# Patient Record
Sex: FEMALE | Race: WHITE | NOT HISPANIC OR LATINO | Employment: OTHER | URBAN - METROPOLITAN AREA
[De-identification: names, ages, dates, MRNs, and addresses within clinical notes are randomized per-mention and may not be internally consistent; named-entity substitution may affect disease eponyms.]

---

## 2017-01-06 ENCOUNTER — TELEPHONE (OUTPATIENT)
Dept: INTERNAL MEDICINE | Facility: CLINIC | Age: 67
End: 2017-01-06

## 2017-03-04 DIAGNOSIS — K21.9 GASTROESOPHAGEAL REFLUX DISEASE, ESOPHAGITIS PRESENCE NOT SPECIFIED: ICD-10-CM

## 2017-03-04 NOTE — TELEPHONE ENCOUNTER
omeprazole (PRILOSEC) 40 MG capsule      Last Written Prescription Date: 12/06/16  Last Fill Quantity: 90 cap,  # refills: 0   Last Office Visit with [facility], [facility] or [facility] prescribing provider: 10/04/16

## 2017-03-06 RX ORDER — OMEPRAZOLE 40 MG/1
40 CAPSULE, DELAYED RELEASE ORAL DAILY
Qty: 90 CAPSULE | Refills: 2 | Status: SHIPPED | OUTPATIENT
Start: 2017-03-06 | End: 2017-10-17

## 2017-06-05 ENCOUNTER — HOSPITAL ENCOUNTER (EMERGENCY)
Facility: CLINIC | Age: 67
Discharge: HOME | End: 2017-06-05
Attending: EMERGENCY MEDICINE | Admitting: EMERGENCY MEDICINE
Payer: COMMERCIAL

## 2017-06-05 ENCOUNTER — APPOINTMENT (OUTPATIENT)
Dept: GENERAL RADIOLOGY | Facility: CLINIC | Age: 67
End: 2017-06-05
Attending: EMERGENCY MEDICINE
Payer: COMMERCIAL

## 2017-06-05 VITALS
HEIGHT: 65 IN | TEMPERATURE: 98 F | BODY MASS INDEX: 24.66 KG/M2 | SYSTOLIC BLOOD PRESSURE: 127 MMHG | OXYGEN SATURATION: 96 % | WEIGHT: 148 LBS | DIASTOLIC BLOOD PRESSURE: 78 MMHG | RESPIRATION RATE: 18 BRPM

## 2017-06-05 DIAGNOSIS — R07.2 PRECORDIAL CHEST PAIN: ICD-10-CM

## 2017-06-05 DIAGNOSIS — K21.9 GASTROESOPHAGEAL REFLUX DISEASE, ESOPHAGITIS PRESENCE NOT SPECIFIED: ICD-10-CM

## 2017-06-05 LAB
ALBUMIN SERPL-MCNC: 4.1 G/DL (ref 3.4–5)
ALP SERPL-CCNC: 79 U/L (ref 40–150)
ALT SERPL W P-5'-P-CCNC: 28 U/L (ref 0–50)
ANION GAP SERPL CALCULATED.3IONS-SCNC: 9 MMOL/L (ref 3–14)
AST SERPL W P-5'-P-CCNC: 17 U/L (ref 0–45)
BASOPHILS # BLD AUTO: 0 10E9/L (ref 0–0.2)
BASOPHILS NFR BLD AUTO: 0.4 %
BILIRUB SERPL-MCNC: 0.4 MG/DL (ref 0.2–1.3)
BUN SERPL-MCNC: 24 MG/DL (ref 7–30)
CALCIUM SERPL-MCNC: 9.4 MG/DL (ref 8.5–10.1)
CHLORIDE SERPL-SCNC: 109 MMOL/L (ref 94–109)
CO2 SERPL-SCNC: 22 MMOL/L (ref 20–32)
CREAT SERPL-MCNC: 0.72 MG/DL (ref 0.52–1.04)
DIFFERENTIAL METHOD BLD: NORMAL
EOSINOPHIL # BLD AUTO: 0.1 10E9/L (ref 0–0.7)
EOSINOPHIL NFR BLD AUTO: 0.6 %
ERYTHROCYTE [DISTWIDTH] IN BLOOD BY AUTOMATED COUNT: 13.1 % (ref 10–15)
GFR SERPL CREATININE-BSD FRML MDRD: 81 ML/MIN/1.7M2
GLUCOSE SERPL-MCNC: 110 MG/DL (ref 70–99)
HCT VFR BLD AUTO: 44.2 % (ref 35–47)
HGB BLD-MCNC: 14.5 G/DL (ref 11.7–15.7)
IMM GRANULOCYTES # BLD: 0 10E9/L (ref 0–0.4)
IMM GRANULOCYTES NFR BLD: 0.4 %
INTERPRETATION ECG - MUSE: NORMAL
LIPASE SERPL-CCNC: 162 U/L (ref 73–393)
LYMPHOCYTES # BLD AUTO: 1.1 10E9/L (ref 0.8–5.3)
LYMPHOCYTES NFR BLD AUTO: 13.3 %
MCH RBC QN AUTO: 30.3 PG (ref 26.5–33)
MCHC RBC AUTO-ENTMCNC: 32.8 G/DL (ref 31.5–36.5)
MCV RBC AUTO: 93 FL (ref 78–100)
MONOCYTES # BLD AUTO: 0.4 10E9/L (ref 0–1.3)
MONOCYTES NFR BLD AUTO: 5.1 %
NEUTROPHILS # BLD AUTO: 6.7 10E9/L (ref 1.6–8.3)
NEUTROPHILS NFR BLD AUTO: 80.2 %
NRBC # BLD AUTO: 0 10*3/UL
NRBC BLD AUTO-RTO: 0 /100
PLATELET # BLD AUTO: 259 10E9/L (ref 150–450)
POTASSIUM SERPL-SCNC: 3.8 MMOL/L (ref 3.4–5.3)
PROT SERPL-MCNC: 7.2 G/DL (ref 6.8–8.8)
RBC # BLD AUTO: 4.78 10E12/L (ref 3.8–5.2)
SODIUM SERPL-SCNC: 140 MMOL/L (ref 133–144)
TROPONIN I SERPL-MCNC: NORMAL UG/L (ref 0–0.04)
WBC # BLD AUTO: 8.3 10E9/L (ref 4–11)

## 2017-06-05 PROCEDURE — 84484 ASSAY OF TROPONIN QUANT: CPT | Performed by: EMERGENCY MEDICINE

## 2017-06-05 PROCEDURE — 25000132 ZZH RX MED GY IP 250 OP 250 PS 637: Performed by: EMERGENCY MEDICINE

## 2017-06-05 PROCEDURE — 99285 EMERGENCY DEPT VISIT HI MDM: CPT | Mod: 25

## 2017-06-05 PROCEDURE — 71020 XR CHEST 2 VW: CPT

## 2017-06-05 PROCEDURE — 93005 ELECTROCARDIOGRAM TRACING: CPT

## 2017-06-05 PROCEDURE — 80053 COMPREHEN METABOLIC PANEL: CPT | Performed by: EMERGENCY MEDICINE

## 2017-06-05 PROCEDURE — 25000125 ZZHC RX 250: Performed by: EMERGENCY MEDICINE

## 2017-06-05 PROCEDURE — 83690 ASSAY OF LIPASE: CPT | Performed by: EMERGENCY MEDICINE

## 2017-06-05 PROCEDURE — 85025 COMPLETE CBC W/AUTO DIFF WBC: CPT | Performed by: EMERGENCY MEDICINE

## 2017-06-05 RX ORDER — SUCRALFATE ORAL 1 G/10ML
1 SUSPENSION ORAL 4 TIMES DAILY
Qty: 240 ML | Refills: 0 | Status: ON HOLD | OUTPATIENT
Start: 2017-06-05 | End: 2017-12-21

## 2017-06-05 RX ORDER — TIZANIDINE 2 MG/1
2-4 TABLET ORAL 3 TIMES DAILY PRN
Qty: 10 TABLET | Refills: 0 | Status: SHIPPED | OUTPATIENT
Start: 2017-06-05 | End: 2018-10-10

## 2017-06-05 RX ADMIN — LIDOCAINE HYDROCHLORIDE 30 ML: 20 SOLUTION ORAL; TOPICAL at 10:05

## 2017-06-05 ASSESSMENT — ENCOUNTER SYMPTOMS: SHORTNESS OF BREATH: 0

## 2017-06-05 NOTE — ED AVS SNAPSHOT
[facility] Emergency Department    [address]    [facility] [address]    Phone:  [phone]    Fax:  [phone]                                       Debby Chan   MRN: [id number]    Department:  [facility] Emergency Department   Date of Visit:  6/5/2017           Patient Information     Date Of Birth          1950        Your diagnoses for this visit were:     Precordial chest pain     Gastroesophageal reflux disease, esophagitis presence not specified        You were seen by Leanna Mcfarlane MD.      Follow-up Information     Follow up with Ida Lyles MD In 1 week.    Specialty:  Internal Medicine    Contact information:    [facility]  [address] [facility] [address]  [phone]          Follow up with [facility] Emergency Department.    Specialty:  EMERGENCY MEDICINE    Why:  As needed, If symptoms worsen    Contact information:    [address]  [facility] [address] [phone]        Discharge Instructions       Enteric coated ibuprofen (advil; motrin) is easier on the stomach     Discharge Instructions  Chest Pain    You have been seen today for chest pain or discomfort.  At this time, your doctor has found no signs that your chest pain is due to a serious or life-threatening condition, (or you have declined more testing and/or admission to the hospital). However, sometimes there is a serious problem that does not show up right away. Your evaluation today may not be complete and you may need further testing and evaluation.     You need to follow-up with your regular doctor.    Return to the Emergency Department if:    Your chest pain changes, gets worse, starts to happen more often, or comes with less activity.    You are short of breath.    You get very weak or tired.    You pass out or faint.    You have any new symptoms, like fever, cough, numb legs, or you cough up  blood.    You have anything else that worries you.    Until you follow-up with your regular doctor please do the following:    Take one aspirin daily unless you have an allergy or are told not to by your doctor.    If a stress test appointment has been made, go to the appointment.    If you have questions, contact your regular doctor.    If your doctor today has told you to follow-up with your regular doctor, it is very important that you make an appointment with your clinic and go to the appointment.  If you do not follow-up with your primary doctor, it may result in missing an important development which could result in permanent injury or disability and/or lasting pain.  If there is any problem keeping your appointment, call your doctor or return to the Emergency Department.    If you were given a prescription for medicine here today, be sure to read all of the information (including the package insert) that comes with your prescription.  This will include important information about the medicine, its side effects, and any warnings that you need to know about.  The pharmacist who fills the prescription can provide more information and answer questions you may have about the medicine.  If you have questions or concerns that the pharmacist cannot address, please call or return to the Emergency Department.       Remember that you can always come back to the Emergency Department if you are not able to see your regular doctor in the amount of time listed above, if you get any new symptoms, or if there is anything that worries you.          24 Hour Appointment Hotline       To make an appointment at any [facility], call [phone] ([phone]). If you don't have a family doctor or clinic, we will help you find one. [facility] are conveniently located to serve the needs of you and your family.             Review of your medicines      START taking        Dose / Directions Last dose taken    sucralfate  1 GM/10ML suspension   Commonly known as:  CARAFATE   Dose:  1 g   Quantity:  240 mL        Take 10 mLs (1 g) by mouth 4 times daily (heartburn symptoms)   Refills:  0        tiZANidine 2 MG tablet   Commonly known as:  ZANAFLEX   Dose:  2-4 mg   Quantity:  10 tablet        Take 1-2 tablets (2-4 mg) by mouth 3 times daily as needed (muscle relaxant)   Refills:  0          Our records show that you are taking the medicines listed below. If these are incorrect, please call your family doctor or clinic.        Dose / Directions Last dose taken    atorvastatin 20 MG tablet   Commonly known as:  LIPITOR   Dose:  20 mg   Quantity:  90 tablet        Take 1 tablet (20 mg) by mouth daily   Refills:  3        CALCIUM + D PO        Take  by mouth. Plus magnesium   Refills:  0        omeprazole 40 MG capsule   Commonly known as:  priLOSEC   Dose:  40 mg   Quantity:  90 capsule        Take 1 capsule (40 mg) by mouth daily Take 30-60 minutes before a meal.   Refills:  2        vitamin D 2000 UNITS tablet   Dose:  1 tablet        Take 1 tablet by mouth daily.   Refills:  0                Prescriptions were sent or printed at these locations (2 Prescriptions)                   Other Prescriptions                Printed at Department/Unit printer (2 of 2)         sucralfate (CARAFATE) 1 GM/10ML suspension               tiZANidine (ZANAFLEX) 2 MG tablet                Procedures and tests performed during your visit     CBC with platelets differential    Chest XR,  PA & LAT    Comprehensive metabolic panel    EKG 12-lead, tracing only    Lipase    Troponin I      Orders Needing Specimen Collection     None      Pending Results     No orders found from 6/3/2017 to 6/6/2017.            Pending Culture Results     No orders found from 6/3/2017 to 6/6/2017.            Pending Results Instructions     If you had any lab results that were not finalized at the time of your Discharge, you can call the ED Lab Result RN at [phone]. You  will be contacted by this team for any positive Lab results or changes in treatment. The nurses are available 7 days a week from 10A to 6:30P.  You can leave a message 24 hours per day and they will return your call.        Test Results From Your Hospital Stay        6/5/2017 11:06 AM      Component Results     Component Value Ref Range & Units Status    Sodium 140 133 - 144 mmol/L Final    Potassium 3.8 3.4 - 5.3 mmol/L Final    Chloride 109 94 - 109 mmol/L Final    Carbon Dioxide 22 20 - 32 mmol/L Final    Anion Gap 9 3 - 14 mmol/L Final    Glucose 110 (H) 70 - 99 mg/dL Final    Urea Nitrogen 24 7 - 30 mg/dL Final    Creatinine 0.72 0.52 - 1.04 mg/dL Final    GFR Estimate 81 >60 mL/min/1.7m2 Final    Non  GFR Calc    GFR Estimate If Black >90   GFR Calc   >60 mL/min/1.7m2 Final    Calcium 9.4 8.5 - 10.1 mg/dL Final    Bilirubin Total 0.4 0.2 - 1.3 mg/dL Final    Albumin 4.1 3.4 - 5.0 g/dL Final    Protein Total 7.2 6.8 - 8.8 g/dL Final    Alkaline Phosphatase 79 40 - 150 U/L Final    ALT 28 0 - 50 U/L Final    AST 17 0 - 45 U/L Final         6/5/2017 11:06 AM      Component Results     Component Value Ref Range & Units Status    Lipase 162 73 - 393 U/L Final         6/5/2017 11:06 AM      Component Results     Component Value Ref Range & Units Status    Troponin I ES  0.000 - 0.045 ug/L Final    <0.015  The 99th percentile for upper reference range is 0.045 ug/L.  Troponin values in   the range of 0.045 - 0.120 ug/L may be associated with risks of adverse   clinical events.           6/5/2017 10:43 AM      Component Results     Component Value Ref Range & Units Status    WBC 8.3 4.0 - 11.0 10e9/L Final    RBC Count 4.78 3.8 - 5.2 10e12/L Final    Hemoglobin 14.5 11.7 - 15.7 g/dL Final    Hematocrit 44.2 35.0 - 47.0 % Final    MCV 93 78 - 100 fl Final    MCH 30.3 26.5 - 33.0 pg Final    MCHC 32.8 31.5 - 36.5 g/dL Final    RDW 13.1 10.0 - 15.0 % Final    Platelet Count 259 150 - 450  10e9/L Final    Diff Method Automated Method  Final    % Neutrophils 80.2 % Final    % Lymphocytes 13.3 % Final    % Monocytes 5.1 % Final    % Eosinophils 0.6 % Final    % Basophils 0.4 % Final    % Immature Granulocytes 0.4 % Final    Nucleated RBCs 0 0 /100 Final    Absolute Neutrophil 6.7 1.6 - 8.3 10e9/L Final    Absolute Lymphocytes 1.1 0.8 - 5.3 10e9/L Final    Absolute Monocytes 0.4 0.0 - 1.3 10e9/L Final    Absolute Eosinophils 0.1 0.0 - 0.7 10e9/L Final    Absolute Basophils 0.0 0.0 - 0.2 10e9/L Final    Abs Immature Granulocytes 0.0 0 - 0.4 10e9/L Final    Absolute Nucleated RBC 0.0  Final         6/5/2017 10:41 AM      Narrative     CHEST TWO VIEWS  6/5/2017 10:20 AM     HISTORY: Sternal pain.     COMPARISON: Chest x-ray 12/6/2016.        Impression     IMPRESSION: PA and lateral views of the chest. Lungs are clear. Heart  is normal in size. No effusions are evident. No pneumothorax.    JOSE AGUILAR MD                Clinical Quality Measure: Blood Pressure Screening     Your blood pressure was checked while you were in the emergency department today. The last reading we obtained was  BP: 127/71 . Please read the guidelines below about what these numbers mean and what you should do about them.  If your systolic blood pressure (the top number) is less than 120 and your diastolic blood pressure (the bottom number) is less than 80, then your blood pressure is normal. There is nothing more that you need to do about it.  If your systolic blood pressure (the top number) is 120-139 or your diastolic blood pressure (the bottom number) is 80-89, your blood pressure may be higher than it should be. You should have your blood pressure rechecked within a year by a primary care provider.  If your systolic blood pressure (the top number) is 140 or greater or your diastolic blood pressure (the bottom number) is 90 or greater, you may have high blood pressure. High blood pressure is treatable, but if left untreated over  time it can put you at risk for heart attack, stroke, or kidney failure. You should have your blood pressure rechecked by a primary care provider within the next 4 weeks.  If your provider in the emergency department today gave you specific instructions to follow-up with your doctor or provider even sooner than that, you should follow that instruction and not wait for up to 4 weeks for your follow-up visit.        Thank you for choosing [facility]       Thank you for choosing [facility] for your care. Our goal is always to provide you with excellent care. Hearing back from our patients is one way we can continue to improve our services. Please take a few minutes to complete the written survey that you may receive in the mail after you visit with us. Thank you!        [employer] Information     [employer] gives you secure access to your electronic health record. If you see a primary care provider, you can also send messages to your care team and make appointments. If you have questions, please call your primary care clinic.  If you do not have a primary care provider, please call [phone] and they will assist you.        Care EveryWhere ID     This is your Care EveryWhere ID. This could be used by other organizations to access your [facility] medical records  [id number]        After Visit Summary       This is your record. Keep this with you and show to your community pharmacist(s) and doctor(s) at your next visit.

## 2017-06-05 NOTE — ED AVS SNAPSHOT
[facility] Emergency Department    [address]    [facility] [address]    Phone:  [phone]    Fax:  [phone]                                       Debby Chan   MRN: [id number]    Department:  [facility] Emergency Department   Date of Visit:  6/5/2017           After Visit Summary Signature Page     I have received my discharge instructions, and my questions have been answered. I have discussed any challenges I see with this plan with the nurse or doctor.    ..........................................................................................................................................  Patient/Patient Representative Signature      ..........................................................................................................................................  Patient Representative Print Name and Relationship to Patient    ..................................................               ................................................  Date                                            Time    ..........................................................................................................................................  Reviewed by Signature/Title    ...................................................              ..............................................  Date                                                            Time

## 2017-06-05 NOTE — ED PROVIDER NOTES
"  History     Chief Complaint:  Chest pain    HPI   Debby Chan is a 66 year old female who presents with chest pain. The patient reports that she suffered a chest wall injury several years ago, that occasionally flares up when she \"overdoes something\". Typically when this occurs she has acid reflux kick in. The patient reports she had a flare up of this chest pain four days ago. She describes a burning sensation in her chest. This is very similar to previous episodes, but has been longer lasting. Pepto-bismol helps with the pain but then it comes back. She takes omeprazole daily. The pain does not worsen with eating. Denies shortness of breath.  Has not had endoscopy since ED evaluation for this last winter because her PCP wanted her on three months of PPI first.    Allergies:  Penicillin     Medications:    Prilosec  Lipitor  Multivitamins  Coenzyme Q    Past Medical History:    HLD    Past Surgical History:    Breast biopsy  Tubal ligation    Family History:    CAD  Prostate cancer  Type II DM  Heart failure  Breast cancer    Social History:  Relationship status:   Tobacco use: Negative  Alcohol use: Positive  The patient presents alone.     Review of Systems   Respiratory: Negative for shortness of breath.    Cardiovascular: Positive for chest pain.   All other systems reviewed and are negative.      Physical Exam   First Vitals:  BP: 154/92  Temp: 98  F (36.7  C)  Temp src: Oral  Resp: 18  SpO2: 99 %  Height: 163.8 cm (5' 4.5\")  Weight: 67.1 kg (148 lb)  Physical Exam  Eyes:  Sclera white; Pupils are equal and round  ENT:    External ears and nares normal  CV:  Regular rate and rhythm, No murmur     No carotid bruit    Sternal tenderness, no rash    No BLE edema or calf tenderness  Resp:  Breath sounds clear and equal bilaterally  GI:  Abdomen is soft, non-tender, non-distended    No rebound tenderness or peritoneal features  MS:  Moves all extremities  Skin:  Warm and dry  Neuro: Speech is " normal and fluent. No apparent deficit.          Emergency Department Course   ECG (10:04:51):  Indication: Chest pain.   Rate 70 bpm. AZ interval 134. QRS duration 70. QT/QTc 390/421. P-R-T axes 26.   Interpretation: Normal sinus rhythm.  Agree with computer interpretation.  No significant change compared to EKG dated 12/6/16.   Interpreted at 1015 by Dr. Mcfarlane.     Imaging:  Radiographic findings were communicated with the patient who voiced understanding of the findings.    Chest XR, PA and LAT, per radiology:   PA and lateral views of the chest. Lungs are clear. Heart is normal in size. No effusions are evident. No pneumothorax.    Laboratory:  CBC: WNL (WBC 8.3, HGB 14.5, )  CMP: Glucose 110 (H), o/w WNL (Creatinine 0.72)  1005: Troponin I: <0.015  Lipase: 162    Interventions:  1005: GI cocktail, 30 mL, PO    Emergency Department Course:  Nursing notes and vitals reviewed.  I performed an exam of the patient as documented above.  The above workup was undertaken.  1130: I rechecked the patient and discussed results.    Findings and plan explained to the Patient. Patient discharged home, status improved, with instructions regarding supportive care, medications, and reasons to return as well as the importance of close follow-up was reviewed.      Impression & Plan      Medical Decision Making:  Debby Chan is a 66 year old female here for evaluation of chest pain and worsening of her heart burn symptoms. This feels similar to when she has had muscular flares in the past. Based on her age, however, this could be an atypical presentation of ACS. Differential also includes costochondritis, underlying pneumonia, pleural effusion, or GI process such as pancreatitis. EKG and blood work were checked. Based on duration of symptoms, a single troponin is sufficient to rule out underlying cardiac process. She felt improved after GI cocktail. She will be expanding her treatment with the addition of  Carafate and close PCP follow up to discuss endoscopy. She asked for something else for the chest wall discomfort, and muscle relaxer was prescribed.     Diagnosis:    ICD-10-CM   1. Precordial chest pain R07.2   2. Gastroesophageal reflux disease, esophagitis presence not specified K21.9     Disposition:  Discharge to home with primary care follow up.    Discharge Medications:   SUCRALFATE (CARAFATE) 1 GM/10ML SUSPENSION    Take 10 mLs (1 g) by mouth 4 times daily (heartburn symptoms)    TIZANIDINE (ZANAFLEX) 2 MG TABLET    Take 1-2 tablets (2-4 mg) by mouth 3 times daily as needed (muscle relaxant)     IArash, am serving as a scribe on 6/5/2017 at 9:49 AM to personally document services performed by Leanna Mcfarlane MD, based on my observations and the provider's statements to me.    [facility] EMERGENCY DEPARTMENT       Leanna Mcfarlane MD  06/05/17 [id number]

## 2017-06-05 NOTE — ED NOTES
"Hx acid reflux.  States if does something strenuous it flares up.  Hx from hitting chest on coat rack 20 years ago.  Had a flare up on Friday, 3 days ago.  This morning \"it burns\" and has sinus on cheek this morning. Denies trouble breathing, leg swelling.  Has been taking generic tylenol and omeprazole this morning, also takes pepto bismol for flares; which has not taken today. States has lifted something that causes a flare. Pain sometimes worse if doesn't eat.  States cardiac hx in family, hears heart beating at times.  Is concerned about heart. ABCD's intact; A/o x 4.   "

## 2017-06-05 NOTE — DISCHARGE INSTRUCTIONS
Enteric coated ibuprofen (advil; motrin) is easier on the stomach     Discharge Instructions  Chest Pain    You have been seen today for chest pain or discomfort.  At this time, your doctor has found no signs that your chest pain is due to a serious or life-threatening condition, (or you have declined more testing and/or admission to the hospital). However, sometimes there is a serious problem that does not show up right away. Your evaluation today may not be complete and you may need further testing and evaluation.     You need to follow-up with your regular doctor.    Return to the Emergency Department if:    Your chest pain changes, gets worse, starts to happen more often, or comes with less activity.    You are short of breath.    You get very weak or tired.    You pass out or faint.    You have any new symptoms, like fever, cough, numb legs, or you cough up blood.    You have anything else that worries you.    Until you follow-up with your regular doctor please do the following:    Take one aspirin daily unless you have an allergy or are told not to by your doctor.    If a stress test appointment has been made, go to the appointment.    If you have questions, contact your regular doctor.    If your doctor today has told you to follow-up with your regular doctor, it is very important that you make an appointment with your clinic and go to the appointment.  If you do not follow-up with your primary doctor, it may result in missing an important development which could result in permanent injury or disability and/or lasting pain.  If there is any problem keeping your appointment, call your doctor or return to the Emergency Department.    If you were given a prescription for medicine here today, be sure to read all of the information (including the package insert) that comes with your prescription.  This will include important information about the medicine, its side effects, and any warnings that you need to know  about.  The pharmacist who fills the prescription can provide more information and answer questions you may have about the medicine.  If you have questions or concerns that the pharmacist cannot address, please call or return to the Emergency Department.       Remember that you can always come back to the Emergency Department if you are not able to see your regular doctor in the amount of time listed above, if you get any new symptoms, or if there is anything that worries you.

## 2017-06-15 ENCOUNTER — OFFICE VISIT (OUTPATIENT)
Dept: INTERNAL MEDICINE | Facility: CLINIC | Age: 67
End: 2017-06-15
Payer: COMMERCIAL

## 2017-06-15 VITALS
HEART RATE: 74 BPM | OXYGEN SATURATION: 98 % | HEIGHT: 65 IN | SYSTOLIC BLOOD PRESSURE: 108 MMHG | BODY MASS INDEX: 24.47 KG/M2 | TEMPERATURE: 97.8 F | DIASTOLIC BLOOD PRESSURE: 60 MMHG | WEIGHT: 146.9 LBS

## 2017-06-15 DIAGNOSIS — K21.9 GASTROESOPHAGEAL REFLUX DISEASE, ESOPHAGITIS PRESENCE NOT SPECIFIED: Primary | ICD-10-CM

## 2017-06-15 PROCEDURE — 99213 OFFICE O/P EST LOW 20 MIN: CPT | Performed by: INTERNAL MEDICINE

## 2017-06-15 NOTE — PROGRESS NOTES
"  SUBJECTIVE:                                                      HPI: Debby Chan is a pleasant 66 year old female who presents for ER follow-up:    - has a history of \"weak chest wall\" after a chest wall injury many years ago (walked into a clothing rack)  - whenever she overexerts herself, she develops chest wall pain  - for her chest wall pain she takes aspirin or ibuprofen, often in the middle the night on an empty stomach  - shortly after taking aspirin or ibuprofen she developed severe heartburn  - severe heartburn has now prompted several visits to the ER (November, 2016, December, 2016, and last week)  - symptoms resolve with GI cocktails in ER; cardiac workups negative  - no recurrent symptoms since ER visit    - on omeprazole 40 mg daily  - no acid reflux symptoms in the absence of taking aspirin or ibuprofen, as above    - no nausea, vomiting, or abdominal pain  - no anorexia, early satiety, or unintentional weight loss  - no diarrhea, constipation, melena, or hematochezia  - no fevers or chills    The medication, allergy, and problem lists have been reviewed and updated as appropriate.       OBJECTIVE:                                                      /60 (BP Location: Left arm, Patient Position: Chair, Cuff Size: Adult Regular)  Pulse 74  Temp 97.8  F (36.6  C) (Oral)  Ht 5' 4.5\" (1.638 m)  Wt 146 lb 14.4 oz (66.6 kg)  SpO2 98%  BMI 24.83 kg/m2  Constitutional: well-appearing  Respiratory: normal respiratory effort; clear to auscultation bilaterally  Cardiovascular: regular rate and rhythm; no edema  Gastrointestinal: soft, non-tender, non-distended, and bowel sounds present; no organomegaly or masses   Musculoskeletal: normal gait and station  Psych: normal judgment and insight; normal mood and affect; recent and remote memory intact      ASSESSMENT/PLAN:                                                      (K21.9) Gastroesophageal reflux disease, esophagitis presence not " specified  (primary encounter diagnosis)  Comment:    - generally well controlled on omeprazole 40 mg daily.   - clearly exacerbated by aspirin and ibuprofen use on an empty stomach.   - no red flag symptoms to suggest significant pathology.  Plan:    - CONTINUE omeprazole 40 mg daily.   - avoid overexertion.   - may try enteric coated aspirin and ibuprofen - may help, may not.   - always take aspirin and ibuprofen with meals, never on an empty stomach.   - if symptoms recur in spite of above interventions, please contact M.D.    The instructions on the AVS were discussed and explained to the patient. Patient expressed understanding of instructions.    Ida Lyles MD   [address] [address]  T: [phone], F: [phone]

## 2017-06-15 NOTE — PATIENT INSTRUCTIONS
Try EC (enteric coated) ibuprofen or aspirin next time. May help (not guaranteed).     Take aspirin and ibuprofen with food.    Continue Omeprazole.    May use Sucralfate (aka Carafate) 4x/day as needed for additional relief.

## 2017-06-15 NOTE — MR AVS SNAPSHOT
After Visit Summary   6/15/2017    Debby Chan    MRN: [id number]           Patient Information     Date Of Birth          1950        Visit Information        Provider Department      6/15/2017 9:00 AM Ida Lyles MD [facility]        Care Instructions    Try EC (enteric coated) ibuprofen or aspirin next time. May help (not guaranteed).     Take aspirin and ibuprofen with food.    Continue Omeprazole.    May use Sucralfate (aka Carafate) 4x/day as needed for additional relief.             Follow-ups after your visit        Who to contact     If you have questions or need follow up information about today's clinic visit or your schedule please contact [facility] directly at [phone].  Normal or non-critical lab and imaging results will be communicated to you by MyChart, letter or phone within 4 business days after the clinic has received the results. If you do not hear from us within 7 days, please contact the clinic through MyChart or phone. If you have a critical or abnormal lab result, we will notify you by phone as soon as possible.  Submit refill requests through [employer] or call your pharmacy and they will forward the refill request to us. Please allow 3 business days for your refill to be completed.          Additional Information About Your Visit        MyChart Information     [employer] gives you secure access to your electronic health record. If you see a primary care provider, you can also send messages to your care team and make appointments. If you have questions, please call your primary care clinic.  If you do not have a primary care provider, please call [phone] and they will assist you.        Care EveryWhere ID     This is your Care EveryWhere ID. This could be used by other organizations to access your [place] medical records  [id number]        Your Vitals Were     Pulse Temperature Height Pulse Oximetry  "BMI (Body Mass Index)       74 97.8  F (36.6  C) (Oral) 5' 4.5\" (1.638 m) 98% 24.83 kg/m2        Blood Pressure from Last 3 Encounters:   06/15/17 108/60   06/05/17 127/78   12/06/16 133/63    Weight from Last 3 Encounters:   06/15/17 146 lb 14.4 oz (66.6 kg)   06/05/17 148 lb (67.1 kg)   12/06/16 145 lb (65.8 kg)              Today, you had the following     No orders found for display       Primary Care Provider Office Phone # Fax #    Ida Lyles [phone]       [facility] [address] [facility] [address]        Thank you!     Thank you for choosing [facility]  for your care. Our goal is always to provide you with excellent care. Hearing back from our patients is one way we can continue to improve our services. Please take a few minutes to complete the written survey that you may receive in the mail after your visit with us. Thank you!             Your Updated Medication List - Protect others around you: Learn how to safely use, store and throw away your medicines at www.disposemymeds.org.          This list is accurate as of: 6/15/17  9:15 AM.  Always use your most recent med list.                   Brand Name Dispense Instructions for use    atorvastatin 20 MG tablet    LIPITOR    90 tablet    Take 1 tablet (20 mg) by mouth daily       CALCIUM + D PO      Take  by mouth. Plus magnesium       omeprazole 40 MG capsule    priLOSEC    90 capsule    Take 1 capsule (40 mg) by mouth daily Take 30-60 minutes before a meal.       sucralfate 1 GM/10ML suspension    CARAFATE    240 mL    Take 10 mLs (1 g) by mouth 4 times daily (heartburn symptoms)       tiZANidine 2 MG tablet    ZANAFLEX    10 tablet    Take 1-2 tablets (2-4 mg) by mouth 3 times daily as needed (muscle relaxant)       vitamin D 2000 UNITS tablet      Take 1 tablet by mouth daily.         "

## 2017-06-15 NOTE — NURSING NOTE
"Chief Complaint   Patient presents with     ER F/U     FVRD 6/5/17 for GI upset and chest pain. Feeling better today.       Initial /60 (BP Location: Left arm, Patient Position: Chair, Cuff Size: Adult Regular)  Pulse 74  Temp 97.8  F (36.6  C) (Oral)  Ht 5' 4.5\" (1.638 m)  Wt 146 lb 14.4 oz (66.6 kg)  SpO2 98%  BMI 24.83 kg/m2 Estimated body mass index is 24.83 kg/(m^2) as calculated from the following:    Height as of this encounter: 5' 4.5\" (1.638 m).    Weight as of this encounter: 146 lb 14.4 oz (66.6 kg).  Medication Reconciliation: complete     Kaminibose MA      "

## 2017-07-31 ENCOUNTER — OFFICE VISIT (OUTPATIENT)
Dept: INTERNAL MEDICINE | Facility: CLINIC | Age: 67
End: 2017-07-31
Payer: COMMERCIAL

## 2017-07-31 VITALS
WEIGHT: 148.6 LBS | DIASTOLIC BLOOD PRESSURE: 86 MMHG | SYSTOLIC BLOOD PRESSURE: 126 MMHG | TEMPERATURE: 98.3 F | HEIGHT: 65 IN | HEART RATE: 84 BPM | BODY MASS INDEX: 24.76 KG/M2 | OXYGEN SATURATION: 99 %

## 2017-07-31 DIAGNOSIS — H01.139 ECZEMA OF EYELID, UNSPECIFIED LATERALITY: Primary | ICD-10-CM

## 2017-07-31 PROCEDURE — 99213 OFFICE O/P EST LOW 20 MIN: CPT | Performed by: PHYSICIAN ASSISTANT

## 2017-07-31 RX ORDER — TRIAMCINOLONE ACETONIDE 1 MG/G
OINTMENT TOPICAL
Qty: 15 G | Refills: 1 | Status: SHIPPED | OUTPATIENT
Start: 2017-07-31 | End: 2019-11-11

## 2017-07-31 NOTE — MR AVS SNAPSHOT
After Visit Summary   7/31/2017    Debby Chan    MRN: [id number]           Patient Information     Date Of Birth          1950        Visit Information        Provider Department      7/31/2017 2:20 PM Leanna Campos PA-C [facility]        Today's Diagnoses     Eczema of eyelid, unspecified laterality    -  1      Care Instructions    Use skin moisturizers such as Cetaphil, Eucerin and Lubriderm Reviewed sensitive skin cares, avoiding perfumed skin products, detergents, and soaps.           Follow-ups after your visit        Who to contact     If you have questions or need follow up information about today's clinic visit or your schedule please contact [facility] directly at [phone].  Normal or non-critical lab and imaging results will be communicated to you by MyChart, letter or phone within 4 business days after the clinic has received the results. If you do not hear from us within 7 days, please contact the clinic through MyChart or phone. If you have a critical or abnormal lab result, we will notify you by phone as soon as possible.  Submit refill requests through [employer] or call your pharmacy and they will forward the refill request to us. Please allow 3 business days for your refill to be completed.          Additional Information About Your Visit        MyChart Information     [employer] gives you secure access to your electronic health record. If you see a primary care provider, you can also send messages to your care team and make appointments. If you have questions, please call your primary care clinic.  If you do not have a primary care provider, please call [phone] and they will assist you.        Care EveryWhere ID     This is your Care EveryWhere ID. This could be used by other organizations to access your [place] medical records  [id number]        Your Vitals Were     Pulse Temperature Height Pulse  "Oximetry BMI (Body Mass Index)       84 98.3  F (36.8  C) (Oral) 5' 4.5\" (1.638 m) 99% 25.11 kg/m2        Blood Pressure from Last 3 Encounters:   07/31/17 126/86   06/15/17 108/60   06/05/17 127/78    Weight from Last 3 Encounters:   07/31/17 148 lb 9.6 oz (67.4 kg)   06/15/17 146 lb 14.4 oz (66.6 kg)   06/05/17 148 lb (67.1 kg)              Today, you had the following     No orders found for display         Today's Medication Changes          These changes are accurate as of: 7/31/17  2:37 PM.  If you have any questions, ask your nurse or doctor.               Start taking these medicines.        Dose/Directions    triamcinolone 0.1 % ointment   Commonly known as:  KENALOG   Used for:  Eczema of eyelid, unspecified laterality   Started by:  Leanna Campos PA-C        Apply sparingly to affected area three times daily for 14 days.   Quantity:  15 g   Refills:  1            Where to get your medicines      These medications were sent to [facility] [address] [place] [address]     Phone:  [phone]     triamcinolone 0.1 % ointment                Primary Care Provider Office Phone # Fax #    Ida Lyles [phone]       [facility] [place] [address] [facility] [address]        Equal Access to Services     VANESSA BURNETTE AH: Hadii rashmi ku hadasho Soomaali, waaxda luqadaha, qaybta kaalmada adeegyada, waxay christiano joshi. So [facility] [phone].    ATENCIÓN: Si habla deniañol, tiene a witt disposición servicios gratuitos de asistencia lingüística. Llame al [phone].    We comply with applicable federal civil rights laws and [place] laws. We do not discriminate on the basis of race, color, national origin, age, disability sex, sexual orientation or gender identity.            Thank you!     Thank you for choosing [facility]  for your care. Our goal is always " to provide you with excellent care. Hearing back from our patients is one way we can continue to improve our services. Please take a few minutes to complete the written survey that you may receive in the mail after your visit with us. Thank you!             Your Updated Medication List - Protect others around you: Learn how to safely use, store and throw away your medicines at www.disposemymeds.org.          This list is accurate as of: 7/31/17  2:37 PM.  Always use your most recent med list.                   Brand Name Dispense Instructions for use Diagnosis    atorvastatin 20 MG tablet    LIPITOR    90 tablet    Take 1 tablet (20 mg) by mouth daily    Pure hypercholesterolemia       CALCIUM + D PO      Take  by mouth. Plus magnesium        omeprazole 40 MG capsule    priLOSEC    90 capsule    Take 1 capsule (40 mg) by mouth daily Take 30-60 minutes before a meal.    Gastroesophageal reflux disease, esophagitis presence not specified       sucralfate 1 GM/10ML suspension    CARAFATE    240 mL    Take 10 mLs (1 g) by mouth 4 times daily (heartburn symptoms)        tiZANidine 2 MG tablet    ZANAFLEX    10 tablet    Take 1-2 tablets (2-4 mg) by mouth 3 times daily as needed (muscle relaxant)        triamcinolone 0.1 % ointment    KENALOG    15 g    Apply sparingly to affected area three times daily for 14 days.    Eczema of eyelid, unspecified laterality       vitamin D 2000 UNITS tablet      Take 1 tablet by mouth daily.    Gastritis, GERD (gastroesophageal reflux disease)

## 2017-07-31 NOTE — NURSING NOTE
"Chief Complaint   Patient presents with     Eye Problem     itching-swollen eyes        Initial /86 (BP Location: Left arm, Patient Position: Chair, Cuff Size: Adult Regular)  Pulse 84  Temp 98.3  F (36.8  C) (Oral)  Ht 5' 4.5\" (1.638 m)  Wt 148 lb 9.6 oz (67.4 kg)  SpO2 99%  BMI 25.11 kg/m2 Estimated body mass index is 25.11 kg/(m^2) as calculated from the following:    Height as of this encounter: 5' 4.5\" (1.638 m).    Weight as of this encounter: 148 lb 9.6 oz (67.4 kg).  Medication Reconciliation: complete    "

## 2017-07-31 NOTE — PATIENT INSTRUCTIONS
Use skin moisturizers such as Cetaphil, Eucerin and Lubriderm Reviewed sensitive skin cares, avoiding perfumed skin products, detergents, and soaps.

## 2017-07-31 NOTE — PROGRESS NOTES
"  SUBJECTIVE:                                                    Debby Chan is a 67 year old female who presents to clinic today for the following health issues:      Concern - Eye Problem   Onset: x2 months     Description:   Pt states she sees her aunt in a nursing home, and she has had swollen red itchy eyes.   Pt started to develop the same symptoms as her about x2 months ago. Very itchy, swollen and red.     Intensity: moderate    Progression of Symptoms:  Comes and goes     Accompanying Signs & Symptoms:  Na     Previous history of similar problem:   No     Precipitating factors:   Worsened by: during the afternoon is worse than the day     Alleviating factors:  Improved by: no     Therapies Tried and outcome: Vaseline, benadryl, sudafed-no relief      Redness of upper lids, itchy, irritation. Has changed back to old products used on face.   New makeup   Notes eye are worse in the afternoon and when she is visiting aunt.       -------------------------------------    Problem list and histories reviewed & adjusted, as indicated.  Additional history: as documented    Labs reviewed in EPIC    Reviewed and updated as needed this visit by clinical staffTobacco  Allergies       Reviewed and updated as needed this visit by Provider  Allergies  Meds         ROS:  Constitutional, HEENT, cardiovascular, pulmonary, , derm systems are negative, except as otherwise noted.      OBJECTIVE:   /86 (BP Location: Left arm, Patient Position: Chair, Cuff Size: Adult Regular)  Pulse 84  Temp 98.3  F (36.8  C) (Oral)  Ht 5' 4.5\" (1.638 m)  Wt 148 lb 9.6 oz (67.4 kg)  SpO2 99%  BMI 25.11 kg/m2  Body mass index is 25.11 kg/(m^2).  GENERAL: healthy, alert and no distress  EYES: Eyes grossly normal to inspection and eyelids- upper lids with redness, swelling- skin rough/thickened  RESP: lungs clear to auscultation - no rales, rhonchi or wheezes  CV: regular rate and rhythm, normal S1 S2, no S3 or S4, no " murmur, click or rub, no peripheral edema and peripheral pulses strong  SKIN: no suspicious lesions or rashes    Diagnostic Test Results:  none     ASSESSMENT/PLAN:             1. Eczema of eyelid, unspecified laterality    - triamcinolone (KENALOG) 0.1 % ointment; Apply sparingly to affected area three times daily for 14 days.  Dispense: 15 g; Refill: 1    See Patient Instructions    Leanna Campos PA-C  [facility]

## 2017-08-18 ENCOUNTER — OFFICE VISIT (OUTPATIENT)
Dept: INTERNAL MEDICINE | Facility: CLINIC | Age: 67
End: 2017-08-18
Payer: COMMERCIAL

## 2017-08-18 VITALS
WEIGHT: 147 LBS | OXYGEN SATURATION: 98 % | HEART RATE: 84 BPM | TEMPERATURE: 97.8 F | SYSTOLIC BLOOD PRESSURE: 110 MMHG | DIASTOLIC BLOOD PRESSURE: 64 MMHG | BODY MASS INDEX: 24.84 KG/M2 | RESPIRATION RATE: 16 BRPM

## 2017-08-18 DIAGNOSIS — E78.00 PURE HYPERCHOLESTEROLEMIA: ICD-10-CM

## 2017-08-18 DIAGNOSIS — M79.671 RIGHT FOOT PAIN: ICD-10-CM

## 2017-08-18 DIAGNOSIS — M72.2 PLANTAR FASCIITIS: Primary | ICD-10-CM

## 2017-08-18 PROCEDURE — 99213 OFFICE O/P EST LOW 20 MIN: CPT | Performed by: PHYSICIAN ASSISTANT

## 2017-08-18 RX ORDER — ATORVASTATIN CALCIUM 20 MG/1
TABLET, FILM COATED ORAL
Qty: 90 TABLET | Refills: 0 | Status: SHIPPED | OUTPATIENT
Start: 2017-08-18 | End: 2017-10-17

## 2017-08-18 NOTE — PROGRESS NOTES
SUBJECTIVE:   Debby Chan is a 67 year old female who presents to clinic today for the following health issues:      Foot Pain      Duration: 2 weeks     Description (location/character/radiation): right foot    Intensity:  mild    Accompanying signs and symptoms: pain on top of right foot for a couple weeks, unsure of exact cause    History (similar episodes/previous evaluation): None    Precipitating or alleviating factors: None    Therapies tried and outcome: None    pain noted at the top of the foot and at the arch of the foot.  Some foot toe cramping noted too.  Had been walking more before pain started.         -------------------------------------    Problem list and histories reviewed & adjusted, as indicated.  Additional history: as documented    Labs reviewed in EPIC    Reviewed and updated as needed this visit by clinical staffTobacco       Reviewed and updated as needed this visit by Provider  Allergies  Meds         ROS:  Constitutional, HEENT, cardiovascular, pulmonary, gi and gu systems are negative, except as otherwise noted.      OBJECTIVE:   /64 (BP Location: Left arm, Cuff Size: Adult Regular)  Pulse 84  Temp 97.8  F (36.6  C) (Oral)  Resp 16  Wt 147 lb (66.7 kg)  SpO2 98%  BMI 24.84 kg/m2  Body mass index is 24.84 kg/(m^2).  GENERAL: healthy, alert and no distress  RESP: lungs clear to auscultation - no rales, rhonchi or wheezes  CV: regular rate and rhythm, normal S1 S2, no S3 or S4, no murmur, click or rub, no peripheral edema and peripheral pulses strong  MS: right foot, tenderness to palpation plantar fascia   No redness or swelling, no bruising noted  Small bunion  SKIN: no suspicious lesions or rashes    Diagnostic Test Results:  none     ASSESSMENT/PLAN:             1. Plantar fasciitis      2. Right foot pain        Reviewed stretching, icing   Good foot wear.  Monitor if not improving then seen podiatry  Modify activities- less walking but other lower impact  activities for the foot.   See Patient Instructions    Leanna Campos PA-C  [facility]

## 2017-08-18 NOTE — PATIENT INSTRUCTIONS
Treating Plantar Fasciitis  First, your healthcare provider tries to determine the cause of your problem in order to suggest ways to relieve pain. If your pain is due to poor foot mechanics, custom-made shoe inserts (orthoses) may help.    Reduce symptoms    To relieve mild symptoms, try aspirin, ibuprofen, or other medicines as directed. Rubbing ice on the affected area may also help.    To reduce severe pain and swelling, your healthcare provider may prescribe pills or injections or a walking cast in some instances. Physical therapy, such as ultrasound or a daily stretching program, may also be recommended. Surgery is rarely required.    To reduce symptoms caused by poor foot mechanics, your foot may be taped. This supports the arch and temporarily controls movement. Night splints may also help by stretching the fascia.  Control movement  If taping helps, your healthcare provider may prescribe orthoses. Built from plaster casts of your feet, these inserts control the way your foot moves. As a result, your symptoms should go away.  Reduce overuse  Every time your foot strikes the ground, the plantar fascia is stretched. You can reduce the strain on the plantar fascia and the possibility of overuse by following these suggestions:    Lose any excess weight.    Avoid running on hard or uneven ground.    Use orthoses at all times in your shoes and house slippers.  If surgery is needed  Your healthcare provider may consider surgery if other types of treatment don't control your pain. During surgery, the plantar fascia is partially cut to release tension. As you heal, fibrous tissue fills the space between the heel bone and the plantar fascia.   Date Last Reviewed: 10/14/2015    [id number] [employer]. [address] [place] [address], [place] [address]. All rights reserved. This information is not intended as a substitute for professional medical care. Always follow your healthcare professional's  instructions.

## 2017-08-18 NOTE — MR AVS SNAPSHOT
After Visit Summary   8/18/2017    Debby Chan    MRN: [id number]           Patient Information     Date Of Birth          1950        Visit Information        Provider Department      8/18/2017 9:00 AM Leanna Campos PA-C [facility]        Today's Diagnoses     Plantar fasciitis    -  1    Right foot pain          Care Instructions      Treating Plantar Fasciitis  First, your healthcare provider tries to determine the cause of your problem in order to suggest ways to relieve pain. If your pain is due to poor foot mechanics, custom-made shoe inserts (orthoses) may help.    Reduce symptoms    To relieve mild symptoms, try aspirin, ibuprofen, or other medicines as directed. Rubbing ice on the affected area may also help.    To reduce severe pain and swelling, your healthcare provider may prescribe pills or injections or a walking cast in some instances. Physical therapy, such as ultrasound or a daily stretching program, may also be recommended. Surgery is rarely required.    To reduce symptoms caused by poor foot mechanics, your foot may be taped. This supports the arch and temporarily controls movement. Night splints may also help by stretching the fascia.  Control movement  If taping helps, your healthcare provider may prescribe orthoses. Built from plaster casts of your feet, these inserts control the way your foot moves. As a result, your symptoms should go away.  Reduce overuse  Every time your foot strikes the ground, the plantar fascia is stretched. You can reduce the strain on the plantar fascia and the possibility of overuse by following these suggestions:    Lose any excess weight.    Avoid running on hard or uneven ground.    Use orthoses at all times in your shoes and house slippers.  If surgery is needed  Your healthcare provider may consider surgery if other types of treatment don't control your pain. During surgery, the plantar fascia is  partially cut to release tension. As you heal, fibrous tissue fills the space between the heel bone and the plantar fascia.   Date Last Reviewed: 10/14/2015    9553-2429 [employer] MD, [employer]. [address] [place] [address], [place] [address]. All rights reserved. This information is not intended as a substitute for professional medical care. Always follow your healthcare professional's instructions.                Follow-ups after your visit        Your next 10 appointments already scheduled     Aug 18, 2017  9:00 AM CDT   SHORT with Leanna Campos PA-C   [facility]    [address] [address] [phone]              Who to contact     If you have questions or need follow up information about today's clinic visit or your schedule please contact [facility] directly at [phone].  Normal or non-critical lab and imaging results will be communicated to you by MyChart, letter or phone within 4 business days after the clinic has received the results. If you do not hear from us within 7 days, please contact the clinic through [employer] or phone. If you have a critical or abnormal lab result, we will notify you by phone as soon as possible.  Submit refill requests through [employer] or call your pharmacy and they will forward the refill request to us. Please allow 3 business days for your refill to be completed.          Additional Information About Your Visit        [employer] Information     Synference gives you secure access to your electronic health record. If you see a primary care provider, you can also send messages to your care team and make appointments. If you have questions, please call your primary care clinic.  If you do not have a primary care provider, please call [phone] and they will assist you.        Care EveryWhere ID     This is your Care EveryWhere ID. This could be used by other  organizations to access your [place] medical records  [id number]        Your Vitals Were     Pulse Temperature Respirations Pulse Oximetry BMI (Body Mass Index)       84 97.8  F (36.6  C) (Oral) 16 98% 24.84 kg/m2        Blood Pressure from Last 3 Encounters:   08/18/17 110/64   07/31/17 126/86   06/15/17 108/60    Weight from Last 3 Encounters:   08/18/17 147 lb (66.7 kg)   07/31/17 148 lb 9.6 oz (67.4 kg)   06/15/17 146 lb 14.4 oz (66.6 kg)              Today, you had the following     No orders found for display         Today's Medication Changes          These changes are accurate as of: 8/18/17  8:58 AM.  If you have any questions, ask your nurse or doctor.               These medicines have changed or have updated prescriptions.        Dose/Directions    atorvastatin 20 MG tablet   Commonly known as:  LIPITOR   This may have changed:  See the new instructions.   Used for:  Pure hypercholesterolemia   Changed by:  Ida Lyles MD        Take 1 tablet by mouth  daily   Quantity:  90 tablet   Refills:  0            Where to get your medicines      These medications were sent to [facility] - [address], [facility] [address]     Phone:  [phone]     atorvastatin 20 MG tablet                Primary Care Provider Office Phone # Fax #    Ida Lyles [phone]       [address] [place] [address]        Equal Access to Services     CORTEZ BURNETTE : Hadii aad ku hadasho Soanni, waaxda luqadaha, qaybta kaalmada adeegyada, waxissa christiano joshi. So [facility] [phone].    ATENCIÓN: Si habla español, tiene a witt disposición servicios gratuitos de asistencia lingüística. Llame al [phone].    We comply with applicable federal civil rights laws and [place] laws. We do not discriminate on the basis of race, color, national origin, age, disability sex, sexual orientation or gender  identity.            Thank you!     Thank you for choosing [facility]  for your care. Our goal is always to provide you with excellent care. Hearing back from our patients is one way we can continue to improve our services. Please take a few minutes to complete the written survey that you may receive in the mail after your visit with us. Thank you!             Your Updated Medication List - Protect others around you: Learn how to safely use, store and throw away your medicines at www.disposemymeds.org.          This list is accurate as of: 8/18/17  8:58 AM.  Always use your most recent med list.                   Brand Name Dispense Instructions for use Diagnosis    atorvastatin 20 MG tablet    LIPITOR    90 tablet    Take 1 tablet by mouth  daily    Pure hypercholesterolemia       CALCIUM + D PO      Take  by mouth. Plus magnesium        omeprazole 40 MG capsule    priLOSEC    90 capsule    Take 1 capsule (40 mg) by mouth daily Take 30-60 minutes before a meal.    Gastroesophageal reflux disease, esophagitis presence not specified       sucralfate 1 GM/10ML suspension    CARAFATE    240 mL    Take 10 mLs (1 g) by mouth 4 times daily (heartburn symptoms)        tiZANidine 2 MG tablet    ZANAFLEX    10 tablet    Take 1-2 tablets (2-4 mg) by mouth 3 times daily as needed (muscle relaxant)        triamcinolone 0.1 % ointment    KENALOG    15 g    Apply sparingly to affected area three times daily for 14 days.    Eczema of eyelid, unspecified laterality       vitamin D 2000 UNITS tablet      Take 1 tablet by mouth daily.    Gastritis, GERD (gastroesophageal reflux disease)

## 2017-08-18 NOTE — NURSING NOTE
"Chief Complaint   Patient presents with     Foot Problems       Initial /64 (BP Location: Left arm, Cuff Size: Adult Regular)  Pulse 84  Temp 97.8  F (36.6  C) (Oral)  Resp 16  Wt 147 lb (66.7 kg)  SpO2 98%  BMI 24.84 kg/m2 Estimated body mass index is 24.84 kg/(m^2) as calculated from the following:    Height as of 7/31/17: 5' 4.5\" (1.638 m).    Weight as of this encounter: 147 lb (66.7 kg).  Medication Reconciliation: complete   Gavi Spivey MA      "

## 2017-09-15 ENCOUNTER — HEALTH MAINTENANCE LETTER (OUTPATIENT)
Age: 67
End: 2017-09-15

## 2017-10-17 ENCOUNTER — OFFICE VISIT (OUTPATIENT)
Dept: INTERNAL MEDICINE | Facility: CLINIC | Age: 67
End: 2017-10-17
Payer: COMMERCIAL

## 2017-10-17 VITALS
HEIGHT: 65 IN | DIASTOLIC BLOOD PRESSURE: 70 MMHG | OXYGEN SATURATION: 100 % | WEIGHT: 150.8 LBS | HEART RATE: 71 BPM | SYSTOLIC BLOOD PRESSURE: 122 MMHG | TEMPERATURE: 97.7 F | BODY MASS INDEX: 25.12 KG/M2

## 2017-10-17 DIAGNOSIS — E78.00 PURE HYPERCHOLESTEROLEMIA: ICD-10-CM

## 2017-10-17 DIAGNOSIS — Z78.0 ASYMPTOMATIC MENOPAUSE: ICD-10-CM

## 2017-10-17 DIAGNOSIS — Z23 NEED FOR PROPHYLACTIC VACCINATION AND INOCULATION AGAINST INFLUENZA: ICD-10-CM

## 2017-10-17 DIAGNOSIS — G47.62 NOCTURNAL LEG CRAMPS: ICD-10-CM

## 2017-10-17 DIAGNOSIS — Z00.01 ENCOUNTER FOR ROUTINE ADULT MEDICAL EXAM WITH ABNORMAL FINDINGS: Primary | ICD-10-CM

## 2017-10-17 DIAGNOSIS — Z13.1 SCREENING FOR DIABETES MELLITUS: ICD-10-CM

## 2017-10-17 DIAGNOSIS — K21.9 GASTROESOPHAGEAL REFLUX DISEASE, ESOPHAGITIS PRESENCE NOT SPECIFIED: ICD-10-CM

## 2017-10-17 LAB
ANION GAP SERPL CALCULATED.3IONS-SCNC: 6 MMOL/L (ref 3–14)
BUN SERPL-MCNC: 15 MG/DL (ref 7–30)
CALCIUM SERPL-MCNC: 9.4 MG/DL (ref 8.5–10.1)
CHLORIDE SERPL-SCNC: 108 MMOL/L (ref 94–109)
CHOLEST SERPL-MCNC: 177 MG/DL
CO2 SERPL-SCNC: 28 MMOL/L (ref 20–32)
CREAT SERPL-MCNC: 0.84 MG/DL (ref 0.52–1.04)
GFR SERPL CREATININE-BSD FRML MDRD: 67 ML/MIN/1.7M2
GLUCOSE SERPL-MCNC: 89 MG/DL (ref 70–99)
HDLC SERPL-MCNC: 67 MG/DL
LDLC SERPL CALC-MCNC: 73 MG/DL
MAGNESIUM SERPL-MCNC: 2.2 MG/DL (ref 1.6–2.3)
NONHDLC SERPL-MCNC: 110 MG/DL
PHOSPHATE SERPL-MCNC: 2.9 MG/DL (ref 2.5–4.5)
POTASSIUM SERPL-SCNC: 4 MMOL/L (ref 3.4–5.3)
SODIUM SERPL-SCNC: 142 MMOL/L (ref 133–144)
TRIGL SERPL-MCNC: 186 MG/DL

## 2017-10-17 PROCEDURE — 80048 BASIC METABOLIC PNL TOTAL CA: CPT | Performed by: INTERNAL MEDICINE

## 2017-10-17 PROCEDURE — 90471 IMMUNIZATION ADMIN: CPT | Performed by: INTERNAL MEDICINE

## 2017-10-17 PROCEDURE — 90662 IIV NO PRSV INCREASED AG IM: CPT | Performed by: INTERNAL MEDICINE

## 2017-10-17 PROCEDURE — 83735 ASSAY OF MAGNESIUM: CPT | Performed by: INTERNAL MEDICINE

## 2017-10-17 PROCEDURE — 80061 LIPID PANEL: CPT | Performed by: INTERNAL MEDICINE

## 2017-10-17 PROCEDURE — 99397 PER PM REEVAL EST PAT 65+ YR: CPT | Mod: 25 | Performed by: INTERNAL MEDICINE

## 2017-10-17 PROCEDURE — 36415 COLL VENOUS BLD VENIPUNCTURE: CPT | Performed by: INTERNAL MEDICINE

## 2017-10-17 PROCEDURE — 84100 ASSAY OF PHOSPHORUS: CPT | Performed by: INTERNAL MEDICINE

## 2017-10-17 PROCEDURE — 99213 OFFICE O/P EST LOW 20 MIN: CPT | Mod: 25 | Performed by: INTERNAL MEDICINE

## 2017-10-17 RX ORDER — OMEPRAZOLE 40 MG/1
40 CAPSULE, DELAYED RELEASE ORAL DAILY
Qty: 90 CAPSULE | Refills: 3 | Status: ON HOLD | OUTPATIENT
Start: 2017-10-17 | End: 2017-12-21

## 2017-10-17 RX ORDER — ATORVASTATIN CALCIUM 20 MG/1
20 TABLET, FILM COATED ORAL DAILY
Qty: 90 TABLET | Refills: 3 | Status: SHIPPED | OUTPATIENT
Start: 2017-10-17 | End: 2018-10-13

## 2017-10-17 NOTE — NURSING NOTE
"Chief Complaint   Patient presents with     Wellness Visit     Pt is fasting today.     Musculoskeletal Problem     x 3 weeks. Having bilateral leg cramps more in the R leg severe in the nights       Initial /70 (BP Location: Left arm, Patient Position: Chair, Cuff Size: Adult Regular)  Pulse 71  Temp 97.7  F (36.5  C) (Oral)  Ht 5' 4.5\" (1.638 m)  Wt 150 lb 12.8 oz (68.4 kg)  SpO2 100%  BMI 25.49 kg/m2 Estimated body mass index is 25.49 kg/(m^2) as calculated from the following:    Height as of this encounter: 5' 4.5\" (1.638 m).    Weight as of this encounter: 150 lb 12.8 oz (68.4 kg).  Medication Reconciliation: complete     Kaminibose MA      "

## 2017-10-17 NOTE — PROGRESS NOTES
SUBJECTIVE:                                                      HPI: Debby Chan is a pleasant 67 year old female who presents for a physical.    She complains of leg cramps:  - occur at night  - used to occur infrequently  - now occurring more frequently (most nights, not every night)  - has tried drinking propel water - helps a little  - already on a calcium and magnesium supplement    - no recent changes to diet, fluid intake, her medications  - no recent changes to exercise regimen or daily activities    - cannot tolerate tonic water due to carbonation (exacerbates acid reflux)    Also needs refills of atorvastatin and omeprazole.    ROS:  Constitutional: denies unintentional weight loss or gain; denies fevers, chills, or sweats     Cardiovascular: denies chest pain, palpitations, or edema  Respiratory: denies cough, wheezing, shortness of breath, or dyspnea on exertion  Gastrointestinal: denies nausea, vomiting, constipation, diarrhea, or abdominal pain  Genitourinary: denies urinary frequency, urgency, dysuria, or hematuria  Integumentary: denies rash or pruritus  Musculoskeletal: see above; denies back pain, muscle pain, joint pain, or joint swelling  Neurologic: denies focal weakness, numbness, or tingling  Hematologic/Immunologic: denies history of anemia or blood transfusions  Endocrine: denies heat or cold intolerance; denies polyuria, polydipsia  Psychiatric: denies anxiety; see preventative health below    Past Medical History:   Diagnosis Date     Acid reflux disease      Pure hypercholesterolemia      Past Surgical History:   Procedure Laterality Date     BREAST BIOPSY, RT/LT Right 1985    benign fibroadenoma     TUBAL LIGATION  1990     Family History   Problem Relation Age of Onset     Myocardial Infarction Father      62-63; s/p PCI     Prostate Cancer Father      Type 2 Diabetes Father      Heart Failure Mother      Breast Cancer Paternal Aunt      post-menopausal     CANCER No  "family hx of      no ovarian or colon CA     CEREBROVASCULAR DISEASE No family hx of      Coronary Artery Disease Early Onset No family hx of      Occupational History     Part-time at [employer]      Retired from [employer]      Social History Main Topics     Smoking status: Never Smoker     Smokeless tobacco: Never Used     Alcohol use Yes      Comment: ~3 glasses of wine/week     Drug use: No     Sexual activity: Not Currently     Partners: Male     Social History Narrative    .    No kids.    Part-time at [employer].     Retired from [employer].    No formal exercise, but stays active.      Allergies   Allergen Reactions     Penicillins Nausea     Current Outpatient Prescriptions   Medication Sig     atorvastatin (LIPITOR) 20 MG tablet Take 1 tablet (20 mg) by mouth daily     omeprazole (PRILOSEC) 40 MG capsule Take 1 capsule (40 mg) by mouth daily Take 30-60 minutes before a meal.     triamcinolone (KENALOG) 0.1 % ointment Apply sparingly to affected area three times daily for 14 days.     sucralfate (CARAFATE) 1 GM/10ML suspension Take 10 mLs (1 g) by mouth 4 times daily (heartburn symptoms)     tiZANidine (ZANAFLEX) 2 MG tablet Take 1-2 tablets (2-4 mg) by mouth 3 times daily as needed (muscle relaxant)     Cholecalciferol (VITAMIN D) 2000 UNITS tablet Take 1 tablet by mouth daily.     Calcium Carbonate-Vitamin D (CALCIUM + D PO) Take  by mouth. Plus magnesium     Immunization History   Administered Date(s) Administered     Influenza (High Dose) 3 valent vaccine 10/04/2016     Influenza (IIV3) 12/03/2010     Pneumococcal (PCV 13) 08/07/2015     Pneumococcal 23 valent 10/04/2016     TDAP Vaccine (Adacel) 05/31/2012     OBJECTIVE:                                                      /70 (BP Location: Left arm, Patient Position: Chair, Cuff Size: Adult Regular)  Pulse 71  Temp 97.7  F (36.5  C) (Oral)  Ht 5' 4.5\" (1.638 m)  Wt 150 lb 12.8 oz (68.4 kg)  SpO2 100%  BMI 25.49 kg/m2  Constitutional: " well-appearing  Eyes: normal conjunctivae and lids; pupils equal, round, and reactive to light  Ears, Nose, Mouth, and Throat: normal ears and nose; tympanic membranes visualized and normal; normal lips, teeth, and gums; no oropharyngeal lesions or ulcers  Neck: supple and symmetric; no lymphadenopathy; no thyromegaly or masses  Respiratory: normal respiratory effort; clear to auscultation bilaterally  Cardiovascular: regular rate and rhythm; pedal pulses palpable; no edema  Breasts: normal appearance; no masses or skin retraction; no nipple discharge or bleeding; no axillary lymphadenopathy  Gastrointestinal: soft, non-tender, non-distended, and bowel sounds present; no organomegaly or masses  Musculoskeletal: normal gait and station  Psych: normal judgment and insight; normal mood and affect; recent and remote memory intact; oriented to time, place, and person    PREVENTATIVE HEALTH                                                      BMI: normal for age  Blood pressure: within normal limits   Breast CA screening: getting mammogram tomorrow ([facility] imaging)  Cervical CA screening: not medically indicated at this time   Colon CA screening: last performed 2010; report reviewed; repeat due in 2020  Lung CA screening: n/a   Dexa: last performed in 2012 and normal; repeat DUE  Screening HCV: completed  Screening diabetes: up to date, but requested by her wellness form  STD testing: no risk factors present  Depression screening: PHQ-2 assessment completed and reviewed - no intervention indicated at this time  Alcohol misuse screening: alcohol use reviewed - no intervention indicated at this time  Immunizations: reviewed; flu shot DUE    ASSESSMENT/PLAN:                                                       (Z00.01) Encounter for routine adult medical exam with abnormal findings  (primary encounter diagnosis)  Comment: PMH, PSH, FH, SH, medications, allergies, immunizations, and preventative health measures reviewed.    Plan: see below for plans.    (Z23) Need for prophylactic vaccination and inoculation against influenza  Plan: high-dose flu shot given today.    (Z13.1) Screening for diabetes mellitus  Plan: fasting glucose today.    (Z78.0) Asymptomatic menopause  Plan: DEXA ordered - patient to schedule.    (E78.00) Pure hypercholesterolemia  Plan:    - fasting lipid profile today.   - refill of atorvastatin and sent to pharmacy.    (K21.9) Gastroesophageal reflux disease, esophagitis presence not specified  Plan: refill omeprazole provided.    (G47.62) Nocturnal leg cramps  Comment:   - increasing frequency as of late.   - already on magnesium and calcium supplements.  Plan:    - BMP, magnesium, phosphorus today.   - labs unrevealing, patient can try the following:    - daily OTC potassium supplement or banana.    - Acacia's Leg Cramp Remedy.    - [employer] Stop the Leg Cramps formula.   - if continued cramps, can consider quinine tablets (mail order from [place]).     The instructions on the AVS were discussed and explained to the patient. Patient expressed understanding of instructions.    (Chart documentation was completed, in part, with [employer] voice-recognition software. Even though reviewed, some grammatical, spelling, and word errors may remain.)    Ida Lyles MD   [address] [address]  T: [phone], F: [phone]

## 2017-10-17 NOTE — MR AVS SNAPSHOT
After Visit Summary   10/17/2017    Debby Chan    MRN: [id number]           Patient Information     Date Of Birth          1950        Visit Information        Provider Department      10/17/2017 9:00 AM Ida Lyles MD [facility]        Today's Diagnoses     Need for prophylactic vaccination and inoculation against influenza    -  1    Pure hypercholesterolemia        Gastroesophageal reflux disease, esophagitis presence not specified        Nocturnal leg cramps        Screening for diabetes mellitus        Asymptomatic menopause          Care Instructions    Flu shot today.    Shingles next time.     ---    Labs - please proceed to our first floor laboratory to have these drawn (show them your orange ticket).     Results:    If normal: we will release results in [employer] or send them in the mail. You will not be called for these results.    If abnormal, but non-urgent: we will release results in [facility] or send them in the mail. You will not be called for these results.    If abnormal and urgent: we will call you.    ---    If labs are normal, can try adding an over the counter potassium supplement (or banana!) to daily regimen.    Could also try Acacia's Leg Cramp Remedy or Fei Stop the Leg Cramps.                  Follow-ups after your visit        Future tests that were ordered for you today     Open Future Orders        Priority Expected Expires Ordered    DX Hip/Pelvis/Spine Routine  10/18/2018 10/17/2017            Who to contact     If you have questions or need follow up information about today's clinic visit or your schedule please contact [facility] directly at [phone].  Normal or non-critical lab and imaging results will be communicated to you by MyChart, letter or phone within 4 business days after the clinic has received the results. If you do not hear from us within 7 days, please contact the clinic through  "[employer] or phone. If you have a critical or abnormal lab result, we will notify you by phone as soon as possible.  Submit refill requests through adjust or call your pharmacy and they will forward the refill request to us. Please allow 3 business days for your refill to be completed.          Additional Information About Your Visit        [employer] Information     adjust gives you secure access to your electronic health record. If you see a primary care provider, you can also send messages to your care team and make appointments. If you have questions, please call your primary care clinic.  If you do not have a primary care provider, please call [phone] and they will assist you.        Care EveryWhere ID     This is your Care EveryWhere ID. This could be used by other organizations to access your [place] medical records  [id number]        Your Vitals Were     Pulse Temperature Height Pulse Oximetry BMI (Body Mass Index)       71 97.7  F (36.5  C) (Oral) 5' 4.5\" (1.638 m) 100% 25.49 kg/m2        Blood Pressure from Last 3 Encounters:   10/17/17 122/70   08/18/17 110/64   07/31/17 126/86    Weight from Last 3 Encounters:   10/17/17 150 lb 12.8 oz (68.4 kg)   08/18/17 147 lb (66.7 kg)   07/31/17 148 lb 9.6 oz (67.4 kg)              We Performed the Following     Basic metabolic panel     FLU VACCINE, INCREASED ANTIGEN, PRESV FREE, AGE 65+ [48403]     Lipid Profile     Magnesium     Phosphorus     Vaccine Administration, Initial [23276]          Today's Medication Changes          These changes are accurate as of: 10/17/17  9:27 AM.  If you have any questions, ask your nurse or doctor.               These medicines have changed or have updated prescriptions.        Dose/Directions    atorvastatin 20 MG tablet   Commonly known as:  LIPITOR   This may have changed:  See the new instructions.   Used for:  Pure hypercholesterolemia   Changed by:  Ida Lyles MD        Dose:  20 mg   Take 1 tablet (20 mg) by " mouth daily   Quantity:  90 tablet   Refills:  3            Where to get your medicines      These medications were sent to [facility] - [address], [facility] [address]     Phone:  [phone]     atorvastatin 20 MG tablet    omeprazole 40 MG capsule                Primary Care Provider Office Phone # Fax #    Ida Lyles [phone]       [address] [facility] [address]        Equal Access to Services     VANESSA BURNETTE : Hadii aad ku hadasho Soomaali, waaxda luqadaha, qaybta kaalmada adeegyada, waxay idiin hayaan adeeg kharash larosmery . So [facility] [phone].    ATENCIÓN: Si habla español, tiene a witt disposición servicios gratuitos de asistencia lingüística. [facility] [phone].    We comply with applicable federal civil rights laws and [place] laws. We do not discriminate on the basis of race, color, national origin, age, disability, sex, sexual orientation, or gender identity.            Thank you!     Thank you for choosing [facility]  for your care. Our goal is always to provide you with excellent care. Hearing back from our patients is one way we can continue to improve our services. Please take a few minutes to complete the written survey that you may receive in the mail after your visit with us. Thank you!             Your Updated Medication List - Protect others around you: Learn how to safely use, store and throw away your medicines at www.disposemymeds.org.          This list is accurate as of: 10/17/17  9:27 AM.  Always use your most recent med list.                   Brand Name Dispense Instructions for use Diagnosis    atorvastatin 20 MG tablet    LIPITOR    90 tablet    Take 1 tablet (20 mg) by mouth daily    Pure hypercholesterolemia       CALCIUM + D PO      Take  by mouth. Plus magnesium        omeprazole 40 MG capsule    priLOSEC    90 capsule    Take 1 capsule (40 mg) by  mouth daily Take 30-60 minutes before a meal.    Gastroesophageal reflux disease, esophagitis presence not specified       sucralfate 1 GM/10ML suspension    CARAFATE    240 mL    Take 10 mLs (1 g) by mouth 4 times daily (heartburn symptoms)        tiZANidine 2 MG tablet    ZANAFLEX    10 tablet    Take 1-2 tablets (2-4 mg) by mouth 3 times daily as needed (muscle relaxant)        triamcinolone 0.1 % ointment    KENALOG    15 g    Apply sparingly to affected area three times daily for 14 days.    Eczema of eyelid, unspecified laterality       vitamin D 2000 UNITS tablet      Take 1 tablet by mouth daily.    Gastritis, GERD (gastroesophageal reflux disease)

## 2017-10-17 NOTE — PATIENT INSTRUCTIONS
Flu shot today.    Shingles next time.     ---    Labs - please proceed to our first floor laboratory to have these drawn (show them your orange ticket).     Results:    If normal: we will release results in [employer] or send them in the mail. You will not be called for these results.    If abnormal, but non-urgent: we will release results in MyChart or send them in the mail. You will not be called for these results.    If abnormal and urgent: we will call you.    ---    If labs are normal, can try adding an over the counter potassium supplement (or banana!) to daily regimen.    Could also try Acacia's Leg Cramp Remedy or [employer] Stop the Leg Cramps.

## 2017-10-17 NOTE — PROGRESS NOTES
Injectable Influenza Immunization Documentation    1.  Is the person to be vaccinated sick today?   No    2. Does the person to be vaccinated have an allergy to a component   of the vaccine?   No    3. Has the person to be vaccinated ever had a serious reaction   to influenza vaccine in the past?   No    4. Has the person to be vaccinated ever had Guillain-Barré syndrome?   No    Form completed by Flo VAZQUEZ

## 2017-10-18 ENCOUNTER — TRANSFERRED RECORDS (OUTPATIENT)
Dept: HEALTH INFORMATION MANAGEMENT | Facility: CLINIC | Age: 67
End: 2017-10-18

## 2017-10-19 DIAGNOSIS — Z53.9 ERRONEOUS ENCOUNTER--DISREGARD: Primary | ICD-10-CM

## 2017-10-24 ENCOUNTER — MYC MEDICAL ADVICE (OUTPATIENT)
Dept: INTERNAL MEDICINE | Facility: CLINIC | Age: 67
End: 2017-10-24

## 2017-11-02 ENCOUNTER — RADIANT APPOINTMENT (OUTPATIENT)
Dept: BONE DENSITY | Facility: CLINIC | Age: 67
End: 2017-11-02
Attending: INTERNAL MEDICINE
Payer: COMMERCIAL

## 2017-11-02 DIAGNOSIS — Z78.0 ASYMPTOMATIC MENOPAUSE: ICD-10-CM

## 2017-11-02 PROCEDURE — 77080 DXA BONE DENSITY AXIAL: CPT | Performed by: INTERNAL MEDICINE

## 2017-12-08 ENCOUNTER — OFFICE VISIT (OUTPATIENT)
Dept: INTERNAL MEDICINE | Facility: CLINIC | Age: 67
End: 2017-12-08
Payer: COMMERCIAL

## 2017-12-08 ENCOUNTER — RADIANT APPOINTMENT (OUTPATIENT)
Dept: GENERAL RADIOLOGY | Facility: CLINIC | Age: 67
End: 2017-12-08
Attending: INTERNAL MEDICINE
Payer: COMMERCIAL

## 2017-12-08 VITALS
TEMPERATURE: 97.9 F | SYSTOLIC BLOOD PRESSURE: 136 MMHG | HEIGHT: 65 IN | DIASTOLIC BLOOD PRESSURE: 60 MMHG | HEART RATE: 78 BPM | OXYGEN SATURATION: 98 % | WEIGHT: 151.7 LBS | BODY MASS INDEX: 25.27 KG/M2

## 2017-12-08 DIAGNOSIS — K21.9 GASTROESOPHAGEAL REFLUX DISEASE, ESOPHAGITIS PRESENCE NOT SPECIFIED: Primary | ICD-10-CM

## 2017-12-08 DIAGNOSIS — R10.13 EPIGASTRIC PAIN: ICD-10-CM

## 2017-12-08 DIAGNOSIS — R10.13 EPIGASTRIC PAIN: Primary | ICD-10-CM

## 2017-12-08 LAB
ALBUMIN SERPL-MCNC: 4.3 G/DL (ref 3.4–5)
ALP SERPL-CCNC: 91 U/L (ref 40–150)
ALT SERPL W P-5'-P-CCNC: 30 U/L (ref 0–50)
ANION GAP SERPL CALCULATED.3IONS-SCNC: 8 MMOL/L (ref 3–14)
AST SERPL W P-5'-P-CCNC: 7 U/L (ref 0–45)
BILIRUB SERPL-MCNC: 0.6 MG/DL (ref 0.2–1.3)
BUN SERPL-MCNC: 16 MG/DL (ref 7–30)
CALCIUM SERPL-MCNC: 9.5 MG/DL (ref 8.5–10.1)
CHLORIDE SERPL-SCNC: 108 MMOL/L (ref 94–109)
CO2 SERPL-SCNC: 25 MMOL/L (ref 20–32)
CREAT SERPL-MCNC: 0.84 MG/DL (ref 0.52–1.04)
ERYTHROCYTE [DISTWIDTH] IN BLOOD BY AUTOMATED COUNT: 13.7 % (ref 10–15)
GFR SERPL CREATININE-BSD FRML MDRD: 67 ML/MIN/1.7M2
GLUCOSE SERPL-MCNC: 105 MG/DL (ref 70–99)
HCT VFR BLD AUTO: 46.6 % (ref 35–47)
HGB BLD-MCNC: 15.3 G/DL (ref 11.7–15.7)
LIPASE SERPL-CCNC: 145 U/L (ref 73–393)
MCH RBC QN AUTO: 30.7 PG (ref 26.5–33)
MCHC RBC AUTO-ENTMCNC: 32.8 G/DL (ref 31.5–36.5)
MCV RBC AUTO: 94 FL (ref 78–100)
PLATELET # BLD AUTO: 265 10E9/L (ref 150–450)
POTASSIUM SERPL-SCNC: 3.6 MMOL/L (ref 3.4–5.3)
PROT SERPL-MCNC: 7.7 G/DL (ref 6.8–8.8)
RBC # BLD AUTO: 4.98 10E12/L (ref 3.8–5.2)
SODIUM SERPL-SCNC: 141 MMOL/L (ref 133–144)
TROPONIN I SERPL-MCNC: <0.015 UG/L (ref 0–0.04)
WBC # BLD AUTO: 6.3 10E9/L (ref 4–11)

## 2017-12-08 PROCEDURE — 83690 ASSAY OF LIPASE: CPT | Performed by: INTERNAL MEDICINE

## 2017-12-08 PROCEDURE — 74000 XR ABDOMEN 1 VW: CPT

## 2017-12-08 PROCEDURE — 99214 OFFICE O/P EST MOD 30 MIN: CPT | Performed by: INTERNAL MEDICINE

## 2017-12-08 PROCEDURE — 85027 COMPLETE CBC AUTOMATED: CPT | Performed by: INTERNAL MEDICINE

## 2017-12-08 PROCEDURE — 93000 ELECTROCARDIOGRAM COMPLETE: CPT | Performed by: INTERNAL MEDICINE

## 2017-12-08 PROCEDURE — 36415 COLL VENOUS BLD VENIPUNCTURE: CPT | Performed by: INTERNAL MEDICINE

## 2017-12-08 PROCEDURE — 84484 ASSAY OF TROPONIN QUANT: CPT | Performed by: INTERNAL MEDICINE

## 2017-12-08 PROCEDURE — 80053 COMPREHEN METABOLIC PANEL: CPT | Performed by: INTERNAL MEDICINE

## 2017-12-08 NOTE — MR AVS SNAPSHOT
After Visit Summary   12/8/2017    Debby Chan    MRN: [id number]           Patient Information     Date Of Birth          1950        Visit Information        Provider Department      12/8/2017 8:45 AM Ida Lyles MD [facility]        Today's Diagnoses     Epigastric pain    -  1      Care Instructions    EKG today.    ---    Labs and abdominal xray (standing upright, please) downstairs.    ---      CONTINUE Omeprazole 40mg daily.    START Zantac 150mg twice daily as needed.    ---    Recommendations to follow above tests.             Follow-ups after your visit        Future tests that were ordered for you today     Open Future Orders        Priority Expected Expires Ordered    XR Abdomen 1 View Routine 12/8/2017 12/8/2018 12/8/2017            Who to contact     If you have questions or need follow up information about today's clinic visit or your schedule please contact [facility] directly at [phone].  Normal or non-critical lab and imaging results will be communicated to you by AIRSIShart, letter or phone within 4 business days after the clinic has received the results. If you do not hear from us within 7 days, please contact the clinic through [employer] or phone. If you have a critical or abnormal lab result, we will notify you by phone as soon as possible.  Submit refill requests through uBeam or call your pharmacy and they will forward the refill request to us. Please allow 3 business days for your refill to be completed.          Additional Information About Your Visit        MyChart Information     uBeam gives you secure access to your electronic health record. If you see a primary care provider, you can also send messages to your care team and make appointments. If you have questions, please call your primary care clinic.  If you do not have a primary care provider, please call [phone] and they will assist  "you.        Care EveryWhere ID     This is your Care EveryWhere ID. This could be used by other organizations to access your [facility] medical records  [id number]        Your Vitals Were     Pulse Temperature Height Pulse Oximetry BMI (Body Mass Index)       78 97.9  F (36.6  C) (Oral) 5' 4.5\" (1.638 m) 98% 25.64 kg/m2        Blood Pressure from Last 3 Encounters:   12/08/17 136/60   10/17/17 122/70   08/18/17 110/64    Weight from Last 3 Encounters:   12/08/17 151 lb 11.2 oz (68.8 kg)   10/17/17 150 lb 12.8 oz (68.4 kg)   08/18/17 147 lb (66.7 kg)              We Performed the Following     CBC with platelets     Comprehensive metabolic panel     EKG 12-lead complete w/read - Clinics     Lipase     Troponin I          Today's Medication Changes          These changes are accurate as of: 12/8/17  9:04 AM.  If you have any questions, ask your nurse or doctor.               Start taking these medicines.        Dose/Directions    ranitidine 150 MG/10ML syrup   Commonly known as:  Zantac   Used for:  Epigastric pain   Started by:  Ida Lyles MD        Dose:  150 mg   Take 10 mLs (150 mg) by mouth 2 times daily   Quantity:  600 mL   Refills:  0            Where to get your medicines      These medications were sent to [facility] [address] [place] [id number]     Phone:  [phone]     ranitidine 150 MG/10ML syrup                Primary Care Provider Office Phone # Fax #    Ida Lyles [phone]       [address] [facility] [address]        Equal Access to Services     CORTEZ BURNETTE AH: Hadii rashmi Shay, waaxda luqadaha, qaybta kaalmada adeegyada, destini joshi. So [facility] [phone].    ATENCIÓN: Si habla español, tiene a witt disposición servicios gratuitos de asistencia lingüística. Llame al [phone].    We comply with applicable federal civil rights laws and [place] " laws. We do not discriminate on the basis of race, color, national origin, age, disability, sex, sexual orientation, or gender identity.            Thank you!     Thank you for choosing [facility]  for your care. Our goal is always to provide you with excellent care. Hearing back from our patients is one way we can continue to improve our services. Please take a few minutes to complete the written survey that you may receive in the mail after your visit with us. Thank you!             Your Updated Medication List - Protect others around you: Learn how to safely use, store and throw away your medicines at www.disposemymeds.org.          This list is accurate as of: 12/8/17  9:04 AM.  Always use your most recent med list.                   Brand Name Dispense Instructions for use Diagnosis    atorvastatin 20 MG tablet    LIPITOR    90 tablet    Take 1 tablet (20 mg) by mouth daily    Pure hypercholesterolemia       CALCIUM + D PO      Take  by mouth. Plus magnesium        omeprazole 40 MG capsule    priLOSEC    90 capsule    Take 1 capsule (40 mg) by mouth daily Take 30-60 minutes before a meal.    Gastroesophageal reflux disease, esophagitis presence not specified       ranitidine 150 MG/10ML syrup    Zantac    600 mL    Take 10 mLs (150 mg) by mouth 2 times daily    Epigastric pain       sucralfate 1 GM/10ML suspension    CARAFATE    240 mL    Take 10 mLs (1 g) by mouth 4 times daily (heartburn symptoms)        tiZANidine 2 MG tablet    ZANAFLEX    10 tablet    Take 1-2 tablets (2-4 mg) by mouth 3 times daily as needed (muscle relaxant)        triamcinolone 0.1 % ointment    KENALOG    15 g    Apply sparingly to affected area three times daily for 14 days.    Eczema of eyelid, unspecified laterality       vitamin D 2000 UNITS tablet      Take 1 tablet by mouth daily.    Gastritis, GERD (gastroesophageal reflux disease)

## 2017-12-08 NOTE — NURSING NOTE
"Chief Complaint   Patient presents with     Gastrophageal Reflux     x 1 week. With 1 eoisode of a  dizzy spell.       Initial /60 (BP Location: Left arm, Patient Position: Chair, Cuff Size: Adult Regular)  Pulse 78  Temp 97.9  F (36.6  C) (Oral)  Ht 5' 4.5\" (1.638 m)  Wt 151 lb 11.2 oz (68.8 kg)  SpO2 98%  BMI 25.64 kg/m2 Estimated body mass index is 25.64 kg/(m^2) as calculated from the following:    Height as of this encounter: 5' 4.5\" (1.638 m).    Weight as of this encounter: 151 lb 11.2 oz (68.8 kg).  Medication Reconciliation: complete     Kaminibose MA      "

## 2017-12-08 NOTE — PATIENT INSTRUCTIONS
EKG today.    ---    Labs and abdominal xray (standing upright, please) downstairs.    ---      CONTINUE Omeprazole 40mg daily.    START Zantac 150mg twice daily as needed.    ---    Recommendations to follow above tests.

## 2017-12-08 NOTE — PROGRESS NOTES
"    HPI: Debby Chan is a pleasant 67 year old female who presents with flare of acid reflux:    Patient has known chronic history of acid reflux.  On omeprazole 40 mg daily consistently.    Acid reflux flared last week for unknown reasons (no obvious triggers).    Acid reflux continues to flare, off-and-on, since.    Describes flares as severe.    Uses Carafate as needed with only mild improvement of symptoms.    Associated nausea and bloating, but no vomiting, diarrhea, melena, or hematochezia.     Additionally, patient feels unwell:  - feels \"ill\"   - experienced a dizzy spell on Saturday  - having body aches throughout  - left leg seems to be \"vibrating\"    EGD performed in 2008 demonstrated a short segment of Tony's esophagus. No repeat EGD since then.    The medication, allergy, and problem lists have been reviewed and updated as appropriate.       OBJECTIVE:                                                      /60 (BP Location: Left arm, Patient Position: Chair, Cuff Size: Adult Regular)  Pulse 78  Temp 97.9  F (36.6  C) (Oral)  Ht 5' 4.5\" (1.638 m)  Wt 151 lb 11.2 oz (68.8 kg)  SpO2 98%  BMI 25.64 kg/m2  Constitutional: mildly sick and uncomfortable appearing  Respiratory: normal respiratory effort; clear to auscultation bilaterally  Cardiovascular: regular rate and rhythm; no edema  Gastrointestinal: soft, exquisitely tender throughout, especially upper quadrants/epigastrically; non-distended, and bowel sounds present     EKG: NSR      ASSESSMENT/PLAN:                                                      (R10.13) Epigastric pain  (primary encounter diagnosis)  Comment:    - ddx includes flare of acid reflux, gastritis, peptic ulcer disease, or progression of Tony's esophagus.   - EKG NSR.   Plan:    - upright abdominal x-ray today to rule out free air.   - CBC, CMP, lipase, and troponin today.   - recommendations to follow, but at the very least, we will likely recommend upper " endoscopy in near future.   - in the meantime...    - continue omeprazole 40 mg daily.     - add Zantac 150 mg twice a day as needed for more immediate relief.    The instructions on the AVS were discussed and explained to the patient. Patient expressed understanding of instructions.    (Chart documentation was completed, in part, with [employer] voice-recognition software. Even though reviewed, some grammatical, spelling, and word errors may remain.)    Ida Lyles MD   [address] [address]  T: [phone], F: [phone]

## 2017-12-13 ENCOUNTER — TELEPHONE (OUTPATIENT)
Dept: INTERNAL MEDICINE | Facility: CLINIC | Age: 67
End: 2017-12-13

## 2017-12-13 NOTE — TELEPHONE ENCOUNTER
Pt currently taking Omeprazole 40 mg in the morning and takes Zantac 150 mg in the morning and 150 mg after dinner and took it last night.    After dinner last night before going to sleep she had acid reflux symptoms, so pt wondering if she can taken an additional omeprazole 40 mg if she has reflux symptoms.    Dr Lyles, Kindly advise

## 2017-12-13 NOTE — TELEPHONE ENCOUNTER
Reason for Call:  Other call back    Detailed comments: Pt would like Dr Lyles to call her back about ranitidine. Can pt take more that the dosage?    Phone Number Patient can be reached at: Home number on file [phone] (home)    Best Time: anytime    Can we leave a detailed message on this number? YES    Call taken on 12/13/2017 at 7:24 AM by GIUSEPPE TRENT

## 2017-12-14 NOTE — TELEPHONE ENCOUNTER
No - an additional dose of omeprazole will not help (it takes weeks to take effect - no immediate relief).     Zantac will help with immediate relief of symptoms.    If patient wants to skip morning dose of Zantac, she can take all 300mg at once at night.     Can also consider addition of Carafate if additional relief desired.

## 2017-12-15 ENCOUNTER — TELEPHONE (OUTPATIENT)
Dept: INTERNAL MEDICINE | Facility: CLINIC | Age: 67
End: 2017-12-15

## 2017-12-15 NOTE — TELEPHONE ENCOUNTER
Gastroesophageal reflux disease, esophagitis presence not specified [K21.9]  - Primary    NPI # [id number]  Tax ID [id number]    Informed of information of above. Insurance needs prior auth prior to procedure date. Left message for coding office to see if they can find CPT code for procedure (EGD) not referral placed by [employer]. Once insurance has the CPT code, please fax OV notes regarding need for procedure to [phone]

## 2017-12-15 NOTE — TELEPHONE ENCOUNTER
Pt  Is scheduled for an Endoscopy at [facility] 12/21/2017 and her insurance [employer] needs additional information( Diagnosis Code,Procedure Code,NPI # Tax ID # ), before she can have procedure.  Please call insurance company at [phone] option [phone] with information.

## 2017-12-18 NOTE — TELEPHONE ENCOUNTER
Per Leanna in coding this info needs to come from the order provider/office of procedure. She is unable to view it.

## 2017-12-20 RX ORDER — ONDANSETRON 2 MG/ML
4 INJECTION INTRAMUSCULAR; INTRAVENOUS
Status: CANCELLED | OUTPATIENT
Start: 2017-12-20

## 2017-12-20 RX ORDER — LIDOCAINE 40 MG/G
CREAM TOPICAL
Status: CANCELLED | OUTPATIENT
Start: 2017-12-20

## 2017-12-21 ENCOUNTER — SURGERY (OUTPATIENT)
Age: 67
End: 2017-12-21

## 2017-12-21 ENCOUNTER — HOSPITAL ENCOUNTER (OUTPATIENT)
Facility: CLINIC | Age: 67
Discharge: HOME | End: 2017-12-21
Attending: SPECIALIST | Admitting: SPECIALIST
Payer: COMMERCIAL

## 2017-12-21 VITALS
OXYGEN SATURATION: 98 % | BODY MASS INDEX: 24.99 KG/M2 | RESPIRATION RATE: 9 BRPM | SYSTOLIC BLOOD PRESSURE: 126 MMHG | WEIGHT: 150 LBS | HEIGHT: 65 IN | DIASTOLIC BLOOD PRESSURE: 80 MMHG

## 2017-12-21 LAB — UPPER GI ENDOSCOPY: NORMAL

## 2017-12-21 PROCEDURE — 25000125 ZZHC RX 250: Performed by: SPECIALIST

## 2017-12-21 PROCEDURE — 99153 MOD SED SAME PHYS/QHP EA: CPT | Performed by: SPECIALIST

## 2017-12-21 PROCEDURE — 25000128 H RX IP 250 OP 636: Performed by: SPECIALIST

## 2017-12-21 PROCEDURE — 43239 EGD BIOPSY SINGLE/MULTIPLE: CPT | Performed by: SPECIALIST

## 2017-12-21 PROCEDURE — 88305 TISSUE EXAM BY PATHOLOGIST: CPT | Performed by: SPECIALIST

## 2017-12-21 PROCEDURE — 99152 MOD SED SAME PHYS/QHP 5/>YRS: CPT | Performed by: SPECIALIST

## 2017-12-21 PROCEDURE — 88305 TISSUE EXAM BY PATHOLOGIST: CPT | Mod: 26 | Performed by: SPECIALIST

## 2017-12-21 RX ORDER — FENTANYL CITRATE 50 UG/ML
INJECTION, SOLUTION INTRAMUSCULAR; INTRAVENOUS PRN
Status: DISCONTINUED | OUTPATIENT
Start: 2017-12-21 | End: 2017-12-21 | Stop reason: HOSPADM

## 2017-12-21 RX ADMIN — FENTANYL CITRATE 50 MCG: 50 INJECTION, SOLUTION INTRAMUSCULAR; INTRAVENOUS at 10:54

## 2017-12-21 RX ADMIN — TOPICAL ANESTHETIC 1 APPLICATOR: 200 SPRAY DENTAL; PERIODONTAL at 10:50

## 2017-12-21 RX ADMIN — FENTANYL CITRATE 50 MCG: 50 INJECTION, SOLUTION INTRAMUSCULAR; INTRAVENOUS at 10:51

## 2017-12-21 RX ADMIN — MIDAZOLAM 1 MG: 1 INJECTION INTRAMUSCULAR; INTRAVENOUS at 10:51

## 2017-12-21 RX ADMIN — MIDAZOLAM 1 MG: 1 INJECTION INTRAMUSCULAR; INTRAVENOUS at 10:53

## 2017-12-21 NOTE — H&P
Pre-Endoscopy History and Physical     Debby Chan MRN# [id number]   YOB: 1950 Age: 67 year old     Date of Procedure: 12/21/2017  Primary care provider: Ida Lyles  Type of Endoscopy: Colonoscopy with possible biopsy, possible polypectomy  Reason for Procedure: reflux; ten years, on PPI  Type of Anesthesia Anticipated: Conscious Sedation    HPI:    Debby is a 67 year old female who will be undergoing the above procedure.      A history and physical has been performed. The patient's medications and allergies have been reviewed. The risks and benefits of the procedure and the sedation options and risks were discussed with the patient.  All questions were answered and informed consent was obtained.      She denies a personal or family history of anesthesia complications or bleeding disorders.     Patient Active Problem List   Diagnosis     Pure hypercholesterolemia     Acid reflux disease        Past Medical History:   Diagnosis Date     Acid reflux disease      Pure hypercholesterolemia         Past Surgical History:   Procedure Laterality Date     BREAST BIOPSY, RT/LT Right 1985    benign fibroadenoma     TUBAL LIGATION  1990       Social History   Substance Use Topics     Smoking status: Never Smoker     Smokeless tobacco: Never Used     Alcohol use Yes      Comment: ~3 glasses of wine/week       Family History   Problem Relation Age of Onset     Myocardial Infarction Father      62-63; s/p PCI     Prostate Cancer Father      Type 2 Diabetes Father      Heart Failure Mother      Breast Cancer Paternal Aunt      post-menopausal     CANCER No family hx of      no ovarian or colon CA     CEREBROVASCULAR DISEASE No family hx of      Coronary Artery Disease Early Onset No family hx of        Prior to Admission medications    Medication Sig Start Date End Date Taking? Authorizing Provider   OMEPRAZOLE PO Take 40 mg by mouth every morning   Yes Reported, Patient   atorvastatin (LIPITOR) 20  "MG tablet Take 1 tablet (20 mg) by mouth daily 10/17/17  Yes Ida Lyles MD   triamcinolone (KENALOG) 0.1 % ointment Apply sparingly to affected area three times daily for 14 days. 7/31/17  Yes Leanna Campos PA-C   tiZANidine (ZANAFLEX) 2 MG tablet Take 1-2 tablets (2-4 mg) by mouth 3 times daily as needed (muscle relaxant) 6/5/17  Yes Leanna Mcfarlane MD   Cholecalciferol (VITAMIN D) 2000 UNITS tablet Take 1 tablet by mouth daily.   Yes Reported, Patient   Calcium Carbonate-Vitamin D (CALCIUM + D PO) Take  by mouth. Plus magnesium   Yes Reported, Patient       Allergies   Allergen Reactions     Penicillins Nausea        REVIEW OF SYSTEMS:   5 point ROS negative except as noted above in HPI, including Gen., Resp., CV, GI &  system review.    PHYSICAL EXAM:   /64  Resp 18  Ht 1.651 m (5' 5\")  Wt 68 kg (150 lb)  SpO2 99%  BMI 24.96 kg/m2 Estimated body mass index is 24.96 kg/(m^2) as calculated from the following:    Height as of this encounter: 1.651 m (5' 5\").    Weight as of this encounter: 68 kg (150 lb).   GENERAL APPEARANCE: alert, and oriented  MENTAL STATUS: alert  AIRWAY EXAM: Mallampatti Class I (visualization of the soft palate, fauces, uvula, anterior and posterior pillars)  RESP: lungs clear to auscultation - no rales, rhonchi or wheezes  CV: regular rates and rhythm  DIAGNOSTICS:    Not indicated    IMPRESSION   ASA Class 2 - Mild systemic disease    PLAN:   Plan for Gastroscopy with possible biopsy, possible dilation. We discussed the risks, benefits and alternatives and the patient wished to proceed.    The above has been forwarded to the consulting provider.      Signed Electronically by: Srinivasa Jackson  December 21, 2017          "

## 2017-12-21 NOTE — BRIEF OP NOTE
[facility] Brief Operative Note    Pre-operative diagnosis: reflux   Post-operative diagnosis irregular Z line, otherwise normal.      Procedure: Procedure(s):  gastroscopy - Wound Class: II-Clean Contaminated   Surgeon(s): Surgeon(s) and Role:     * Srinivasa Jackson MD - Primary   Estimated blood loss: * No values recorded between 12/21/2017 12:00 AM and 12/21/2017 11:08 AM *    Specimens:   ID Type Source Tests Collected by Time Destination   A : squamocolumnar junction at 37cm Biopsy Gastro Esophageal Junction SURGICAL PATHOLOGY EXAM Srinivasa Jackson MD 12/21/2017 11:03 AM       Findings: Please see ProVation procedure note in Chart Review

## 2017-12-22 LAB — COPATH REPORT: NORMAL

## 2017-12-27 ENCOUNTER — E-VISIT (OUTPATIENT)
Dept: INTERNAL MEDICINE | Facility: CLINIC | Age: 67
End: 2017-12-27
Payer: COMMERCIAL

## 2017-12-27 DIAGNOSIS — Z98.890 H/O ESOPHAGOGASTRODUODENOSCOPY: Primary | ICD-10-CM

## 2018-03-26 ENCOUNTER — MYC MEDICAL ADVICE (OUTPATIENT)
Dept: INTERNAL MEDICINE | Facility: CLINIC | Age: 68
End: 2018-03-26

## 2018-03-26 DIAGNOSIS — R10.13 EPIGASTRIC PAIN: ICD-10-CM

## 2018-10-10 ENCOUNTER — OFFICE VISIT (OUTPATIENT)
Dept: INTERNAL MEDICINE | Facility: CLINIC | Age: 68
End: 2018-10-10
Payer: COMMERCIAL

## 2018-10-10 VITALS
WEIGHT: 148.1 LBS | TEMPERATURE: 97.9 F | DIASTOLIC BLOOD PRESSURE: 80 MMHG | HEART RATE: 73 BPM | RESPIRATION RATE: 16 BRPM | HEIGHT: 65 IN | SYSTOLIC BLOOD PRESSURE: 122 MMHG | OXYGEN SATURATION: 99 % | BODY MASS INDEX: 24.68 KG/M2

## 2018-10-10 DIAGNOSIS — R20.8 BURNING SENSATION OF LOWER EXTREMITY: Primary | ICD-10-CM

## 2018-10-10 DIAGNOSIS — Z13.21 ENCOUNTER FOR VITAMIN DEFICIENCY SCREENING: ICD-10-CM

## 2018-10-10 DIAGNOSIS — G24.5 EYE TWITCH: ICD-10-CM

## 2018-10-10 DIAGNOSIS — R06.83 SNORING: ICD-10-CM

## 2018-10-10 DIAGNOSIS — E78.00 PURE HYPERCHOLESTEROLEMIA: ICD-10-CM

## 2018-10-10 PROCEDURE — 99214 OFFICE O/P EST MOD 30 MIN: CPT | Performed by: INTERNAL MEDICINE

## 2018-10-10 NOTE — PROGRESS NOTES
"  SUBJECTIVE:                                                      HPI: Debby Chan is a pleasant 68 year old female who presents with bilateral leg burning and cramping:    - started several months ago  - started after she started walking again regularly  - stopped walking with temporary improvement in symptoms  - symptoms have been occurring off and on in the last month or two  - often occur after exerting/over exerting herself during the day; does not occur during activity  - often notices symptoms at night while lying in bed  - involves right buttock, bilateral anterior thighs, and top and bottom of both feet    - no back pain  - no urinary retention or incontinence  - no fecal incontinence  - no leg weakness or gait instability    - patient also complains of frequent eyelid twitching  - believes she gets enough sleep at night because \"my Sleep Number bed told me\"  - that being said she usually wakes up feeling un-refreshed and is tired throughout the day  - she has been told that she snores at night    PMH negative for overweight or obesity.    ---    Unrelated to above, she is DUE for cholesterol check and vitamin D screening.    ---    The medication, allergy, and problem lists have been reviewed and updated as appropriate.       OBJECTIVE:                                                      /80  Pulse 73  Temp 97.9  F (36.6  C) (Oral)  Resp 16  Ht 5' 5\" (1.651 m)  Wt 148 lb 1.6 oz (67.2 kg)  SpO2 99%  BMI 24.65 kg/m2  Constitutional: well-appearing  Eyes: lids and conjunctivae normal; extraocular motion intact  Lumbar spine: no deformity, crepitus, or step-off; no spinal or paraspinal tenderness to palpation; negative straight leg raise bilaterally  Musculoskeletal: normal gait and station; normal lower extremity strength      ASSESSMENT/PLAN:                                                      (R20.8) Burning sensation of lower extremity  (primary encounter diagnosis)  Comment: " suspect bilateral lumbar radiculopathy, R>L, possibly from spinal stenosis.  Plan:    - CBC, CMP, TSH reflex, and vitamin B12 level.    - referred to physical therapy for further evaluation and treatment.   - if labs unrevealing and no improvement with PT, can consider EMG and/or lumbar MRI for further evaluation.    (G24.5) Eye twitch  (R06.83) Snoring  Comment:    - suspect eye twitch related to fatigue/poor sleep quality.   - snoring places SANDI on the differential  Plan: patient defers sleep referral at this time - will consider it.    (E78.00) Pure hypercholesterolemia  (Z13.21) Encounter for vitamin deficiency screening  Plan: patient will return for fasting labs when able.    The instructions on the AVS were discussed and explained to the patient. Patient expressed understanding of instructions.    (Chart documentation was completed, in part, with [employer] voice-recognition software. Even though reviewed, some grammatical, spelling, and word errors may remain.)    Ida Lyles MD   [address] [address]  T: [phone], F: [phone]

## 2018-10-10 NOTE — MR AVS SNAPSHOT
After Visit Summary   10/10/2018    Debby Chan    MRN: [id number]           Patient Information     Date Of Birth          1950        Visit Information        Provider Department      10/10/2018 11:45 AM Ida Lyles MD [facility]        Today's Diagnoses     Numbness and tingling of both legs    -  1    Pure hypercholesterolemia        Encounter for vitamin deficiency screening          Care Instructions    Please schedule fasting labs at your convenience.    ---    You will be contacted to schedule physical therapy.               Follow-ups after your visit        Additional Services     [facility] PT, HAND, AND CHIROPRACTIC REFERRAL       Physical Therapy, Hand Therapy and Chiropractic Care are available through:  *[facility]  *Hand Therapy (Occupational Therapy or Physical Therapy)  *[facility]    Call one number to schedule at any of the above locations: [phone].    Physical therapy, Hand therapy and/or Chiropractic care has been recommended by your physician as an excellent treatment option to reduce pain and help people return to normal activities, including sports.  Therapy and/or chiropractic care services are a great complement or alternative to expensive and invasive surgery, injections, or long-term use of prescription medications. The primary goal is to identify the underlying problem and provide you the tools to manage your condition on your own.     Please be aware that coverage of these services is subject to the terms and limitations of your health insurance plan.  Call member services at your health plan with any benefit or coverage questions.      Please bring the following to your appointment:  *Your personal calendar for scheduling future appointments  *Comfortable clothing                  Your next 10 appointments already scheduled     Oct 23, 2018  8:30 AM GALINAT   PHYSICAL with Ida  SURAJ Lyles MD   [facility] ([facility]    [address] [id number] [phone]              Future tests that were ordered for you today     Open Future Orders        Priority Expected Expires Ordered    [facility] PT, HAND, AND CHIROPRACTIC REFERRAL Routine  10/10/2019 10/10/2018    CBC with platelets Routine  10/10/2019 10/10/2018    Comprehensive metabolic panel Routine  10/10/2019 10/10/2018    TSH with free T4 reflex Routine  10/10/2019 10/10/2018    Vitamin B12 Routine  10/10/2019 10/10/2018    Vitamin D Deficiency Routine  10/10/2019 10/10/2018    Lipid Profile Routine  10/10/2019 10/10/2018            Who to contact     If you have questions or need follow up information about today's clinic visit or your schedule please contact [facility] directly at [phone].  Normal or non-critical lab and imaging results will be communicated to you by AlphaB[employer] Labshart, letter or phone within 4 business days after the clinic has received the results. If you do not hear from us within 7 days, please contact the clinic through [employer] or phone. If you have a critical or abnormal lab result, we will notify you by phone as soon as possible.  Submit refill requests through [employer] or call your pharmacy and they will forward the refill request to us. Please allow 3 business days for your refill to be completed.          Additional Information About Your Visit        [employer]shar[employer] Information     [employer] gives you secure access to your electronic health record. If you see a primary care provider, you can also send messages to your care team and make appointments. If you have questions, please call your primary care clinic.  If you do not have a primary care provider, please call [phone] and they will assist you.        Care EveryWhere ID     This is your Care EveryWhere ID. This could be used by other organizations to access your [place]  "medical records  [id number]        Your Vitals Were     Pulse Temperature Respirations Height Pulse Oximetry BMI (Body Mass Index)    73 97.9  F (36.6  C) (Oral) 16 5' 5\" (1.651 m) 99% 24.65 kg/m2       Blood Pressure from Last 3 Encounters:   10/10/18 122/80   12/21/17 126/80   12/08/17 136/60    Weight from Last 3 Encounters:   10/10/18 148 lb 1.6 oz (67.2 kg)   12/21/17 150 lb (68 kg)   12/08/17 151 lb 11.2 oz (68.8 kg)               Primary Care Provider Office Phone # Fax #    Ida Lyles [phone]       [address] [place] [address]        Equal Access to Services     VANESSA BURNETTE : Hadii rashmi snow hadasho Soomaali, waaxda luqadaha, qaybta kaalmada adeegyada, destini weaver . So [facility] [phone].    ATENCIÓN: Si habla español, tiene a witt disposición servicios gratuitos de asistencia lingüística. [facility] [phone].    We comply with applicable federal civil rights laws and [place] laws. We do not discriminate on the basis of race, color, national origin, age, disability, sex, sexual orientation, or gender identity.            Thank you!     Thank you for choosing [facility]  for your care. Our goal is always to provide you with excellent care. Hearing back from our patients is one way we can continue to improve our services. Please take a few minutes to complete the written survey that you may receive in the mail after your visit with us. Thank you!             Your Updated Medication List - Protect others around you: Learn how to safely use, store and throw away your medicines at www.disposemymeds.org.          This list is accurate as of 10/10/18 12:18 PM.  Always use your most recent med list.                   Brand Name Dispense Instructions for use Diagnosis    atorvastatin 20 MG tablet    LIPITOR    90 tablet    Take 1 tablet (20 mg) by mouth daily    Pure hypercholesterolemia       CALCIUM + D PO      Take  by mouth. " Plus magnesium        OMEPRAZOLE PO      Take 40 mg by mouth every morning        ranitidine 150 MG/10ML syrup    Zantac    600 mL    Take 10 mLs (150 mg) by mouth 2 times daily    Epigastric pain       triamcinolone 0.1 % ointment    KENALOG    15 g    Apply sparingly to affected area three times daily for 14 days.    Eczema of eyelid, unspecified laterality       vitamin D 2000 units tablet      Take 1 tablet by mouth daily.    Gastritis, GERD (gastroesophageal reflux disease)

## 2018-10-10 NOTE — PATIENT INSTRUCTIONS
Please schedule fasting labs at your convenience.    ---    You will be contacted to schedule physical therapy.

## 2018-10-13 DIAGNOSIS — E78.00 PURE HYPERCHOLESTEROLEMIA: ICD-10-CM

## 2018-10-13 DIAGNOSIS — K21.9 GASTROESOPHAGEAL REFLUX DISEASE, ESOPHAGITIS PRESENCE NOT SPECIFIED: ICD-10-CM

## 2018-10-14 NOTE — TELEPHONE ENCOUNTER
"Requested Prescriptions   Pending Prescriptions Disp Refills     omeprazole (PRILOSEC) 40 MG capsule [Pharmacy Med Name: OMEPRAZOLE  40MG  CAP] 90 capsule     Historical Sig: TAKE 1 CAPSULE BY MOUTH  DAILY TAKE 30-60 MINUTES  BEFORE A MEAL.    PPI Protocol Passed    10/13/2018  8:02 PM       Passed - Not on Clopidogrel (unless Pantoprazole ordered)       Passed - No diagnosis of osteoporosis on record       Passed - Recent (12 mo) or future (30 days) visit within the authorizing provider's specialty    Patient had office visit in the last 12 months or has a visit in the next 30 days with authorizing provider or within the authorizing provider's specialty.  See \"Patient Info\" tab in inbasket, or \"Choose Columns\" in Meds & Orders section of the refill encounter.           Passed - Patient is age 18 or older       Passed - No active pregnacy on record       Passed - No positive pregnancy test in past 12 months        atorvastatin (LIPITOR) 20 MG tablet [Pharmacy Med Name: ATORVASTATIN  20MG  TAB] 90 tablet     Last Written Prescription Date:  10/17/2017  Last Fill Quantity: 90,  # refills: 3   Last office visit: 10/10/2018 with prescribing provider:  10/10/2018   Future Office Visit:   Next 5 appointments (look out 90 days)     Oct 23, 2018  8:30 AM CDT   PHYSICAL with Ida Lyles MD   [facility] ([facility])    [address] [place] [id number] [phone]                  Sig: TAKE 1 TABLET BY MOUTH  DAILY    Statins Protocol Passed    10/13/2018  8:02 PM       Passed - LDL on file in past 12 months    Recent Labs   Lab Test  10/17/17   0945   LDL  73            Passed - No abnormal creatine kinase in past 12 months    No lab results found.            Passed - Recent (12 mo) or future (30 days) visit within the authorizing provider's specialty    Patient had office visit in the last 12 months or has a visit in the next 30 days with authorizing " "provider or within the authorizing provider's specialty.  See \"Patient Info\" tab in inbasket, or \"Choose Columns\" in Meds & Orders section of the refill encounter.           Passed - Patient is age 18 or older       Passed - No active pregnancy on record       Passed - No positive pregnancy test in past 12 months          "

## 2018-10-15 RX ORDER — ATORVASTATIN CALCIUM 20 MG/1
TABLET, FILM COATED ORAL
Qty: 90 TABLET | Refills: 0 | Status: SHIPPED | OUTPATIENT
Start: 2018-10-15 | End: 2018-10-23

## 2018-10-15 RX ORDER — OMEPRAZOLE 40 MG/1
CAPSULE, DELAYED RELEASE ORAL
Qty: 90 CAPSULE | Refills: 0 | Status: SHIPPED | OUTPATIENT
Start: 2018-10-15 | End: 2018-10-23

## 2018-10-17 DIAGNOSIS — Z13.21 ENCOUNTER FOR VITAMIN DEFICIENCY SCREENING: ICD-10-CM

## 2018-10-17 DIAGNOSIS — E78.00 PURE HYPERCHOLESTEROLEMIA: ICD-10-CM

## 2018-10-17 DIAGNOSIS — R20.8 BURNING SENSATION OF LOWER EXTREMITY: ICD-10-CM

## 2018-10-17 LAB
ALBUMIN SERPL-MCNC: 4.3 G/DL (ref 3.4–5)
ALP SERPL-CCNC: 81 U/L (ref 40–150)
ALT SERPL W P-5'-P-CCNC: 40 U/L (ref 0–50)
ANION GAP SERPL CALCULATED.3IONS-SCNC: 8 MMOL/L (ref 3–14)
AST SERPL W P-5'-P-CCNC: 18 U/L (ref 0–45)
BILIRUB SERPL-MCNC: 0.6 MG/DL (ref 0.2–1.3)
BUN SERPL-MCNC: 18 MG/DL (ref 7–30)
CALCIUM SERPL-MCNC: 9.6 MG/DL (ref 8.5–10.1)
CHLORIDE SERPL-SCNC: 105 MMOL/L (ref 94–109)
CHOLEST SERPL-MCNC: 192 MG/DL
CO2 SERPL-SCNC: 25 MMOL/L (ref 20–32)
CREAT SERPL-MCNC: 0.84 MG/DL (ref 0.52–1.04)
ERYTHROCYTE [DISTWIDTH] IN BLOOD BY AUTOMATED COUNT: 14.1 % (ref 10–15)
GFR SERPL CREATININE-BSD FRML MDRD: 67 ML/MIN/1.7M2
GLUCOSE SERPL-MCNC: 92 MG/DL (ref 70–99)
HCT VFR BLD AUTO: 46.7 % (ref 35–47)
HDLC SERPL-MCNC: 73 MG/DL
HGB BLD-MCNC: 15.2 G/DL (ref 11.7–15.7)
LDLC SERPL CALC-MCNC: 79 MG/DL
MCH RBC QN AUTO: 30.6 PG (ref 26.5–33)
MCHC RBC AUTO-ENTMCNC: 32.5 G/DL (ref 31.5–36.5)
MCV RBC AUTO: 94 FL (ref 78–100)
NONHDLC SERPL-MCNC: 119 MG/DL
PLATELET # BLD AUTO: 257 10E9/L (ref 150–450)
POTASSIUM SERPL-SCNC: 3.8 MMOL/L (ref 3.4–5.3)
PROT SERPL-MCNC: 7.9 G/DL (ref 6.8–8.8)
RBC # BLD AUTO: 4.97 10E12/L (ref 3.8–5.2)
SODIUM SERPL-SCNC: 138 MMOL/L (ref 133–144)
TRIGL SERPL-MCNC: 202 MG/DL
TSH SERPL DL<=0.005 MIU/L-ACNC: 3.66 MU/L (ref 0.4–4)
VIT B12 SERPL-MCNC: 503 PG/ML (ref 193–986)
WBC # BLD AUTO: 6 10E9/L (ref 4–11)

## 2018-10-17 PROCEDURE — 82607 VITAMIN B-12: CPT | Performed by: INTERNAL MEDICINE

## 2018-10-17 PROCEDURE — 36415 COLL VENOUS BLD VENIPUNCTURE: CPT | Performed by: INTERNAL MEDICINE

## 2018-10-17 PROCEDURE — 80061 LIPID PANEL: CPT | Performed by: INTERNAL MEDICINE

## 2018-10-17 PROCEDURE — 84443 ASSAY THYROID STIM HORMONE: CPT | Performed by: INTERNAL MEDICINE

## 2018-10-17 PROCEDURE — 85027 COMPLETE CBC AUTOMATED: CPT | Performed by: INTERNAL MEDICINE

## 2018-10-17 PROCEDURE — 80053 COMPREHEN METABOLIC PANEL: CPT | Performed by: INTERNAL MEDICINE

## 2018-10-17 PROCEDURE — 82306 VITAMIN D 25 HYDROXY: CPT | Performed by: INTERNAL MEDICINE

## 2018-10-18 LAB — DEPRECATED CALCIDIOL+CALCIFEROL SERPL-MC: 40 UG/L (ref 20–75)

## 2018-10-22 ENCOUNTER — THERAPY VISIT (OUTPATIENT)
Dept: PHYSICAL THERAPY | Facility: CLINIC | Age: 68
End: 2018-10-22
Payer: COMMERCIAL

## 2018-10-22 DIAGNOSIS — M54.16 BILATERAL LUMBAR RADICULOPATHY: Primary | ICD-10-CM

## 2018-10-22 DIAGNOSIS — R20.8 BURNING SENSATION OF LOWER EXTREMITY: ICD-10-CM

## 2018-10-22 PROCEDURE — 97110 THERAPEUTIC EXERCISES: CPT | Mod: GP | Performed by: PHYSICAL THERAPIST

## 2018-10-22 PROCEDURE — 97161 PT EVAL LOW COMPLEX 20 MIN: CPT | Mod: GP | Performed by: PHYSICAL THERAPIST

## 2018-10-22 NOTE — MR AVS SNAPSHOT
After Visit Summary   10/22/2018    Debby Chan    MRN: [id number]           Patient Information     Date Of Birth          1950        Visit Information        Provider Department      10/22/2018 10:10 AM Verónica Kahn PT [facility] Physical Therapy        Today's Diagnoses     Bilateral lumbar radiculopathy    -  1    Burning sensation of lower extremity           Follow-ups after your visit        Your next 10 appointments already scheduled     Oct 29, 2018  8:50 AM CDT   HAYLEE Extremity with Verónica Kahn PT   [facility] Physical Therapy ([facility]  )    [address] [place] [id number] [phone]              Who to contact     If you have questions or need follow up information about today's clinic visit or your schedule please contact [facility] PHYSICAL THERAPY directly at [phone].  Normal or non-critical lab and imaging results will be communicated to you by BMEYEhart, letter or phone within 4 business days after the clinic has received the results. If you do not hear from us within 7 days, please contact the clinic through BME[employer]hart or phone. If you have a critical or abnormal lab result, we will notify you by phone as soon as possible.  Submit refill requests through [employer]) or call your pharmacy and they will forward the refill request to us. Please allow 3 business days for your refill to be completed.          Additional Information About Your Visit        BMEYEhart Information     [employer] gives you secure access to your electronic health record. If you see a primary care provider, you can also send messages to your care team and make appointments. If you have questions, please call your primary care clinic.  If you do not have a primary care provider, please call [phone] and they will assist you.        Care EveryWhere ID     This  is your Care EveryWhere ID. This could be used by other organizations to access your [facility] [place] medical records  [id number]         Blood Pressure from Last 3 Encounters:   10/23/18 110/70   10/10/18 122/80   12/21/17 126/80    Weight from Last 3 Encounters:   10/23/18 68 kg (150 lb)   10/10/18 67.2 kg (148 lb 1.6 oz)   12/21/17 68 kg (150 lb)              We Performed the Following     HAY[facility]E Inital Eval Report     [facility] PT, HAND, AND CHIROPRACTIC REFERRAL     PT Eval, Low Complexity (53531)     Therapeutic Exercises        Primary Care Provider Office Phone # Fax #    Ida Lyles [phone]       [address] [place] [address]        Equal Access to Services     VANESSA BURNETTE : Hadii rashmi snow hadasho Soomaali, waaxda luqadaha, qaybta kaalmada adeegyada, waxay christiano weaver . So [facility] [phone].    ATENCIÓN: Si habla español, tiene a witt disposición servicios gratuitos de asistencia lingüística. Dereje al [phone].    We comply with applicable federal civil rights laws and [place] laws. We do not discriminate on the basis of race, color, national origin, age, disability, sex, sexual orientation, or gender identity.            Thank you!     Thank you for choosing [facility] PHYSICAL THERAPY  for your care. Our goal is always to provide you with excellent care. Hearing back from our patients is one way we can continue to improve our services. Please take a few minutes to complete the written survey that you may receive in the mail after your visit with us. Thank you!             Your Updated Medication List - Protect others around you: Learn how to safely use, store and throw away your medicines at www.disposemymeds.org.          This list is accurate as of 10/22/18 11:59 PM.  Always use your most recent med list.                   Brand Name Dispense Instructions for use Diagnosis    atorvastatin 20 MG tablet    LIPITOR    90 tablet     TAKE 1 TABLET BY MOUTH  DAILY    Pure hypercholesterolemia       CALCIUM + D PO      Take  by mouth. Plus magnesium        * OMEPRAZOLE PO      Take 40 mg by mouth every morning        * omeprazole 40 MG capsule    priLOSEC    90 capsule    TAKE 1 CAPSULE BY MOUTH  DAILY TAKE 30-60 MINUTES  BEFORE A MEAL.    Gastroesophageal reflux disease, esophagitis presence not specified       ranitidine 150 MG/10ML syrup    Zantac    600 mL    Take 10 mLs (150 mg) by mouth 2 times daily    Epigastric pain       triamcinolone 0.1 % ointment    KENALOG    15 g    Apply sparingly to affected area three times daily for 14 days.    Eczema of eyelid, unspecified laterality       vitamin D 2000 units tablet      Take 1 tablet by mouth daily.    Gastritis, GERD (gastroesophageal reflux disease)       * Notice:  This list has 2 medication(s) that are the same as other medications prescribed for you. Read the directions carefully, and ask your doctor or other care provider to review them with you.

## 2018-10-22 NOTE — PROGRESS NOTES
"[facility] Initial Evaluation  Subjective:  Patient is a 68 year old female presenting with rehab back hpi.   Debby Chan is a 68 year old female with a lumbar condition.  Condition occurred with:  Insidious onset.  Condition occurred: for unknown reasons.  This is a new condition  Pt presents with complaints of bilateral low back/bilateral LE sx's (R>L). Pt reported onset of LE's sx's this past June. Pt reported she started a walking program and noted \"burning\" in the feet (dorsal aspect) to occur while walking. Pt additionally noted cramping in feet (toes) and calves; pt reported history of muscle cramps in the LE's but frequency of sx's increased. Pt reported they purchased a boat this summer so was more active in general-did not note if this had any significance to her LE sx's. 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 Pt reports an overall decrease in LE sx's since onset back in June.    Patient reports pain:  Lumbar spine right.  Radiates to:  Gluteals right, lower leg right, lower leg left, foot right and foot left (R>L).  Pain is described as cramping and burning and is intermittent Pain Scale: 0-1/10.  Associated symptoms:  Other and tingling.   Symptoms are exacerbated by lying down and other (sx's occur randomly during the day; more consistent during the night while sleeping) and relieved by other (stretching if LE cramping occurs).  Since onset symptoms are gradually improving.                          Barriers include:  None as reported by the patient.    Red flags:  None as reported by the patient.                        Objective:  System         Lumbar/SI Evaluation  ROM:    AROM Lumbar:   Flexion:          Fingertips to lower leg; no provocation of sx's with forwards flexion; provocation of " right sided sx's with return to upright  Ext:                    WFL's, slight provocation of right buttock sx's with extension in standing; provocation of sx's with sustained BRYANNA; no provocation of sx's with EIL   Side Bend:        Left:  WFL's, no provocation of sx's    Right:  WFL's, no provocation of sx's  Rotation:           Left:     Right:   Side Glide:        Left:     Right:                   Neural Tension/Mobility:    Left side:  SLR positive.   Right side:   Slump and SLR positive.      Lumbar Provocation:      Left negative with:  Stork w/ext and PROM hip    Right negative with:  Stork w/ext and PROM hip                                                 General     ROS    Assessment/Plan:    Patient is a 68 year old female with bilateral low back/bilateral LE complaints.    Patient has the following significant findings with corresponding treatment plan.                Diagnosis 1:  Bilateral lumbar radiculopathy, R>L  Pain -  manual therapy, self management, education and home program  Decreased ROM/flexibility - manual therapy and therapeutic exercise  Decreased function - therapeutic activities    Therapy Evaluation Codes:   1) History comprised of:   Personal factors that impact the plan of care:      None.    Comorbidity factors that impact the plan of care are:      Osteoarthritis.     Medications impacting care: None.  2) Examination of Body Systems comprised of:   Body structures and functions that impact the plan of care:      Lumbar spine.   Activity limitations that impact the plan of care are:      Standing, Walking and Sleeping.  3) Clinical presentation characteristics are:   Stable/Uncomplicated.  4) Decision-Making    Low complexity using standardized patient assessment instrument and/or measureable assessment of functional outcome.  Cumulative Therapy Evaluation is: Low complexity.    Previous and current functional limitations:  (See Goal Flow Sheet for this information)    Short term  and Long term goals: (See Goal Flow Sheet for this information)     Communication ability:  Patient appears to be able to clearly communicate and understand verbal and written communication and follow directions correctly.  Treatment Explanation - The following has been discussed with the patient:   RX ordered/plan of care  Anticipated outcomes  Possible risks and side effects  This patient would benefit from PT intervention to resume normal activities.   Rehab potential is good.    Frequency:  1 X week, once daily  Duration:  for 6 weeks  Discharge Plan:  Achieve all LTG.  Independent in home treatment program.  Reach maximal therapeutic benefit.    Please refer to the daily flowsheet for treatment today, total treatment time and time spent performing 1:1 timed codes.

## 2018-10-23 ENCOUNTER — TELEPHONE (OUTPATIENT)
Dept: INTERNAL MEDICINE | Facility: CLINIC | Age: 68
End: 2018-10-23

## 2018-10-23 ENCOUNTER — OFFICE VISIT (OUTPATIENT)
Dept: INTERNAL MEDICINE | Facility: CLINIC | Age: 68
End: 2018-10-23
Payer: COMMERCIAL

## 2018-10-23 VITALS
TEMPERATURE: 97.8 F | RESPIRATION RATE: 16 BRPM | HEART RATE: 75 BPM | BODY MASS INDEX: 24.99 KG/M2 | WEIGHT: 150 LBS | SYSTOLIC BLOOD PRESSURE: 110 MMHG | HEIGHT: 65 IN | OXYGEN SATURATION: 98 % | DIASTOLIC BLOOD PRESSURE: 70 MMHG

## 2018-10-23 DIAGNOSIS — E78.00 PURE HYPERCHOLESTEROLEMIA: ICD-10-CM

## 2018-10-23 DIAGNOSIS — K64.4 EXTERNAL HEMORRHOID: ICD-10-CM

## 2018-10-23 DIAGNOSIS — Z00.00 ROUTINE HISTORY AND PHYSICAL EXAMINATION OF ADULT: Primary | ICD-10-CM

## 2018-10-23 DIAGNOSIS — K21.9 GASTROESOPHAGEAL REFLUX DISEASE, ESOPHAGITIS PRESENCE NOT SPECIFIED: ICD-10-CM

## 2018-10-23 DIAGNOSIS — Z23 NEED FOR PROPHYLACTIC VACCINATION AND INOCULATION AGAINST INFLUENZA: ICD-10-CM

## 2018-10-23 PROBLEM — M54.16 BILATERAL LUMBAR RADICULOPATHY: Status: ACTIVE | Noted: 2018-10-23

## 2018-10-23 PROCEDURE — 90471 IMMUNIZATION ADMIN: CPT | Performed by: INTERNAL MEDICINE

## 2018-10-23 PROCEDURE — 99397 PER PM REEVAL EST PAT 65+ YR: CPT | Mod: 25 | Performed by: INTERNAL MEDICINE

## 2018-10-23 PROCEDURE — 90662 IIV NO PRSV INCREASED AG IM: CPT | Performed by: INTERNAL MEDICINE

## 2018-10-23 RX ORDER — ATORVASTATIN CALCIUM 20 MG/1
20 TABLET, FILM COATED ORAL DAILY
Qty: 90 TABLET | Refills: 3 | Status: SHIPPED | OUTPATIENT
Start: 2018-10-23 | End: 2019-01-03

## 2018-10-23 RX ORDER — OMEPRAZOLE 40 MG/1
40 CAPSULE, DELAYED RELEASE ORAL DAILY
Qty: 90 CAPSULE | Refills: 3 | Status: SHIPPED | OUTPATIENT
Start: 2018-10-23 | End: 2019-01-03

## 2018-10-23 NOTE — PROGRESS NOTES
SUBJECTIVE:                                                      HPI: Debby Chan is a pleasant 68 year old female who presents for a physical.    Complains of a hemorrhoid that has been present for the last week or so. Was more uncomfortable at first than it is now. Now just mild discomfort. Using OTC hemorrhoid cream and Tucks pads as needed.    Stools are daily, soft, formed, and easy to evacuate. No recent constipation.    PMH significant for hemorrhoidectomy in the past.    Screening colonoscopy up-to-date.    ROS:  Constitutional: denies unintentional weight loss or gain; denies fevers, chills, or sweats     Cardiovascular: denies chest pain, palpitations, or edema  Respiratory: denies cough, wheezing, shortness of breath, or dyspnea on exertion  Gastrointestinal: see above  Genitourinary: denies urinary frequency, urgency, dysuria, or hematuria  Integumentary: denies rash or pruritus  Musculoskeletal: denies back pain, muscle pain, joint pain, or joint swelling  Neurologic: denies focal weakness, numbness, or tingling  Hematologic/Immunologic: denies history of anemia or blood transfusions  Endocrine: denies heat or cold intolerance; denies polyuria, polydipsia  Psychiatric: denies anxiety; see preventative health below    Past Medical History:   Diagnosis Date     Acid reflux disease      Pure hypercholesterolemia      Past Surgical History:   Procedure Laterality Date     BREAST BIOPSY, RT/LT Right 1985    benign fibroadenoma     ESOPHAGOSCOPY, GASTROSCOPY, DUODENOSCOPY (EGD), COMBINED N/A 12/21/2017    Procedure: COMBINED ESOPHAGOSCOPY, GASTROSCOPY, DUODENOSCOPY (EGD), BIOPSY SINGLE OR MULTIPLE;  gastroscopy;  Surgeon: Srinivasa Jackson MD;  Location:  GI     TUBAL LIGATION  1990     Family History   Problem Relation Age of Onset     Myocardial Infarction Father      later in life; s/p PCI     Prostate Cancer Father      Type 2 Diabetes Father      Heart Failure Mother      Uterine Cancer  "Paternal Aunt      Cerebrovascular Disease No family hx of      Coronary Artery Disease Early Onset No family hx of      Ovarian Cancer No family hx of      Colon Cancer No family hx of      Occupational History     Part-time at [employer]      Retired from [employer]      Social History Main Topics     Smoking status: Never Smoker     Smokeless tobacco: Never Used     Alcohol use Yes      Comment: ~3 glasses of wine/week     Drug use: No     Sexual activity: Not Currently     Partners: Male     Social History Narrative    .    No kids.    No formal exercise, but stays active.      Allergies   Allergen Reactions     Penicillins Nausea     Current Outpatient Prescriptions   Medication Sig     atorvastatin (LIPITOR) 20 MG tablet Take 1 tablet (20 mg) by mouth daily     Calcium Carbonate-Vitamin D (CALCIUM + D PO) Take  by mouth. Plus magnesium     Cholecalciferol (VITAMIN D) 2000 UNITS tablet Take 1 tablet by mouth daily.     omeprazole (PRILOSEC) 40 MG capsule Take 1 capsule (40 mg) by mouth daily     ranitidine (ZANTAC) 150 MG/10ML syrup Take 10 mLs (150 mg) by mouth 2 times daily     triamcinolone (KENALOG) 0.1 % ointment Apply sparingly to affected area three times daily for 14 days.     Immunization History   Administered Date(s) Administered     Influenza (High Dose) 3 valent vaccine 10/04/2016, 10/17/2017     Influenza (IIV3) PF 12/03/2010     Pneumo Conj 13-V (2010&after) 08/07/2015     Pneumococcal 23 valent 10/04/2016     TDAP Vaccine (Adacel) 05/31/2012     OBJECTIVE:                                                      /70  Pulse 75  Temp 97.8  F (36.6  C) (Oral)  Resp 16  Ht 5' 4.5\" (1.638 m)  Wt 150 lb (68 kg)  SpO2 98%  BMI 25.35 kg/m2  Constitutional: well-appearing  Eyes: normal conjunctivae and lids; pupils equal, round, and reactive to light  Ears, Nose, Mouth, and Throat: normal ears and nose; tympanic membranes visualized and normal; normal lips, teeth, and gums; no oropharyngeal " lesions or ulcers  Neck: supple and symmetric; no lymphadenopathy; no thyromegaly or masses  Respiratory: normal respiratory effort; clear to auscultation bilaterally  Cardiovascular: regular rate and rhythm; pedal pulses palpable; no edema  Breasts: normal appearance; no masses or skin retraction; no nipple discharge or bleeding; no axillary lymphadenopathy  Gastrointestinal: soft, non-tender, non-distended, and bowel sounds present; no organomegaly or masses  Perianal exam: external hemorrhoid, ~1cm in diameter, at 6 o'clock; nonthrombosed, not bleeding, mildly tender to palpation  Musculoskeletal: normal gait and station  Psych: normal judgment and insight; normal mood and affect; recent and remote memory intact; oriented to time, place, and person    PREVENTATIVE HEALTH                                                      BMI: within normal limits (for age)  Blood pressure: within normal limits   Breast CA screening: DUE - getting tomorrow at [facility]  Cervical CA screening: screening no longer indicated  Colon CA screening: last performed 2010; report reviewed; repeat due in 2020  Lung CA screening: n/a   Dexa: up to date   Screening HCV: completed  Screening HIV: n/a   Screening diabetes: up to date   STD testing: no risk factors present  Depression screening: PHQ-2 assessment completed and reviewed - no intervention indicated at this time  Alcohol misuse screening: alcohol use reviewed - no intervention indicated at this time  Immunizations: reviewed; flu shot DUE; Shingrix series DUE - patient will get at later date    ASSESSMENT/PLAN:                                                       (Z00.00) Routine history and physical examination of adult  (primary encounter diagnosis)  Comment: PMH, PSH, FH, SH, medications, allergies, immunizations, and preventative health measures reviewed.   Plan: see below for plans.    (Z23) Need for prophylactic vaccination and inoculation against influenza  Plan: Flu shot given  today.    (K21.9) Gastroesophageal reflux disease, esophagitis presence not specified  Plan: refills of omeprazole and ranitidine provided.    (E78.00) Pure hypercholesterolemia  Plan: refills of atorvastatin provided.    (K64.4) External hemorrhoid  Comment: at 6 o'clock; nonthrombosed, not bleeding, mildly tender to palpation  Plan:    - continue OTC hemorrhoid cream and Tucks pads as needed.   - recommend sits baths and warm, damp compresses as well.   - if hemorrhoid worsens or does not improve over time, patient to contact MD.    - will refer to GI for further management.    The instructions on the AVS were discussed and explained to the patient. Patient expressed understanding of instructions.    (Chart documentation was completed, in part, with Kakoona voice-recognition software. Even though reviewed, some grammatical, spelling, and word errors may remain.)    Ida Lyles MD   [address] [address]  T: [phone], F: [phone]

## 2018-10-23 NOTE — PROGRESS NOTES
[facility] Initial Evaluation  Subjective:  Patient is a 68 year old female presenting with rehab left ankle/foot hpi.                                      Pertinent medical history includes:  Menopausal and other (pain at night/rest).  Medical allergies: yes (Penicillin).      Current occupation is Retired.    Primary job tasks include:  Lifting, driving, prolonged standing and repetitive tasks (carrying, pushing, pulling).              Oswestry Score: 2 %                 Objective:  System    Physical Exam    General     ROS    Assessment/Plan:

## 2018-10-23 NOTE — PATIENT INSTRUCTIONS
"Consider \"Shingrix\" vaccine (series of two shots).     ---    Flu shot today.    ---    Refills of omeprazole, Zantac, and atorvastatin sent in.   "

## 2018-10-23 NOTE — MR AVS SNAPSHOT
"              After Visit Summary   10/23/2018    Debby Chan    MRN: [id number]           Patient Information     Date Of Birth          1950        Visit Information        Provider Department      10/23/2018 8:30 AM Ida Lyles MD [facility]        Today's Diagnoses     Need for prophylactic vaccination and inoculation against influenza    -  1    Gastroesophageal reflux disease, esophagitis presence not specified        Pure hypercholesterolemia          Care Instructions    Consider \"Shingrix\" vaccine (series of two shots).     ---    Flu shot today.    ---    Refills of omeprazole, Zantac, and atorvastatin sent in.           Follow-ups after your visit        Your next 10 appointments already scheduled     Oct 29, 2018  8:50 AM CDT   HAYLEE Extremity with Verónica Kahn PT   [facility] ([facility]  )    [address] [address] [phone]              Who to contact     If you have questions or need follow up information about today's clinic visit or your schedule please contact [facility] directly at [phone].  Normal or non-critical lab and imaging results will be communicated to you by Mobile2Mehart, letter or phone within 4 business days after the clinic has received the results. If you do not hear from us within 7 days, please contact the clinic through Mobile2Mehart or phone. If you have a critical or abnormal lab result, we will notify you by phone as soon as possible.  Submit refill requests through Bilna or call your pharmacy and they will forward the refill request to us. Please allow 3 business days for your refill to be completed.          Additional Information About Your Visit        MyChart Information     Bilna gives you secure access to your electronic health record. If you see a primary care provider, you can also send messages to your " "care team and make appointments. If you have questions, please call your primary care clinic.  If you do not have a primary care provider, please call [phone] and they will assist you.        Care EveryWhere ID     This is your Care EveryWhere ID. This could be used by other organizations to access your [facility] medical records  [id number]        Your Vitals Were     Pulse Temperature Respirations Height Pulse Oximetry BMI (Body Mass Index)    75 97.8  F (36.6  C) (Oral) 16 5' 4.5\" (1.638 m) 98% 25.35 kg/m2       Blood Pressure from Last 3 Encounters:   10/23/18 110/70   10/10/18 122/80   12/21/17 126/80    Weight from Last 3 Encounters:   10/23/18 150 lb (68 kg)   10/10/18 148 lb 1.6 oz (67.2 kg)   12/21/17 150 lb (68 kg)              We Performed the Following     ADMIN INFLUENZA  (For MEDICARE Patients ONLY) []     FLU VACCINE, INCREASED ANTIGEN, PRESV FREE, AGE 65+ [16310]          Today's Medication Changes          These changes are accurate as of 10/23/18  8:50 AM.  If you have any questions, ask your nurse or doctor.               These medicines have changed or have updated prescriptions.        Dose/Directions    atorvastatin 20 MG tablet   Commonly known as:  LIPITOR   This may have changed:  See the new instructions.   Used for:  Pure hypercholesterolemia   Changed by:  Ida Lyles MD        Dose:  20 mg   Take 1 tablet (20 mg) by mouth daily   Quantity:  90 tablet   Refills:  3       omeprazole 40 MG capsule   Commonly known as:  priLOSEC   This may have changed:  See the new instructions.   Used for:  Gastroesophageal reflux disease, esophagitis presence not specified   Changed by:  Ida Lyles MD        Dose:  40 mg   Take 1 capsule (40 mg) by mouth daily   Quantity:  90 capsule   Refills:  3            Where to get your medicines      These medications were sent to [facility] - [place], [place] - [address] [place] [address] [address], [facility] " [address]     Phone:  [phone]     atorvastatin 20 MG tablet    omeprazole 40 MG capsule    ranitidine 150 MG/10ML syrup                Primary Care Provider Office Phone # Fax #    Ida Lyles [phone] [address] [facility] [address]        Equal Access to Services     GIACORTEZ VASILE : Hadii aad ku hadasho Soomaali, waaxda luqadaha, qaybta kaalmada adeegyada, waxay idiin hayaan adeeg kharash la'aan . So [facility] [phone].    ATENCIÓN: Si habla español, tiene a witt disposición servicios gratuitos de asistencia lingüística. Llame al [phone].    We comply with applicable federal civil rights laws and [place] laws. We do not discriminate on the basis of race, color, national origin, age, disability, sex, sexual orientation, or gender identity.            Thank you!     Thank you for choosing [facility]  for your care. Our goal is always to provide you with excellent care. Hearing back from our patients is one way we can continue to improve our services. Please take a few minutes to complete the written survey that you may receive in the mail after your visit with us. Thank you!             Your Updated Medication List - Protect others around you: Learn how to safely use, store and throw away your medicines at www.disposemymeds.org.          This list is accurate as of 10/23/18  8:50 AM.  Always use your most recent med list.                   Brand Name Dispense Instructions for use Diagnosis    atorvastatin 20 MG tablet    LIPITOR    90 tablet    Take 1 tablet (20 mg) by mouth daily    Pure hypercholesterolemia       CALCIUM + D PO      Take  by mouth. Plus magnesium        omeprazole 40 MG capsule    priLOSEC    90 capsule    Take 1 capsule (40 mg) by mouth daily    Gastroesophageal reflux disease, esophagitis presence not specified       ranitidine 150 MG/10ML syrup    Zantac    600 mL    Take 10 mLs (150 mg) by mouth 2 times daily    Gastroesophageal  reflux disease, esophagitis presence not specified       triamcinolone 0.1 % ointment    KENALOG    15 g    Apply sparingly to affected area three times daily for 14 days.    Eczema of eyelid, unspecified laterality       vitamin D 2000 units tablet      Take 1 tablet by mouth daily.    Gastritis, GERD (gastroesophageal reflux disease)

## 2018-10-24 ENCOUNTER — TRANSFERRED RECORDS (OUTPATIENT)
Dept: HEALTH INFORMATION MANAGEMENT | Facility: CLINIC | Age: 68
End: 2018-10-24

## 2018-10-29 ENCOUNTER — THERAPY VISIT (OUTPATIENT)
Dept: PHYSICAL THERAPY | Facility: CLINIC | Age: 68
End: 2018-10-29
Payer: COMMERCIAL

## 2018-10-29 DIAGNOSIS — M54.16 BILATERAL LUMBAR RADICULOPATHY: ICD-10-CM

## 2018-10-29 PROCEDURE — 97530 THERAPEUTIC ACTIVITIES: CPT | Mod: GP | Performed by: PHYSICAL THERAPIST

## 2018-10-29 PROCEDURE — 97110 THERAPEUTIC EXERCISES: CPT | Mod: GP | Performed by: PHYSICAL THERAPIST

## 2018-10-29 NOTE — MR AVS SNAPSHOT
After Visit Summary   10/29/2018    Debby Chan    MRN: [id number]           Patient Information     Date Of Birth          1950        Visit Information        Provider Department      10/29/2018 8:50 AM Verónica Kahn PT [facility] Physical Therapy        Today's Diagnoses     Bilateral lumbar radiculopathy           Follow-ups after your visit        Who to contact     If you have questions or need follow up information about today's clinic visit or your schedule please contact [facility]ICAL THERAP[facility] directly at [phone].  Normal or non-critical lab and imaging results will be communicated to you by Quottehart, letter or phone within 4 business days after the clinic has received the results. If you do not hear from us within 7 days, please contact the clinic through Quottehart or phone. If you have a critical or abnormal lab result, we will notify you by phone as soon as possible.  Submit refill requests through GIVVER or call your pharmacy and they will forward the refill request to us. Please allow 3 business days for your refill to be completed.          Additional Information About Your Visit        MyChart Information     GIVVER gives you secure access to your electronic health record. If you see a primary care provider, you can also send messages to your care team and make appointments. If you have questions, please call your primary care clinic.  If you do not have a primary care provider, please call [phone] and they will assist you.        Care EveryWhere ID     This is your Care EveryWhere ID. This could be used by other organizations to access your [facility] medical records  [id number]         Blood Pressure from Last 3 Encounters:   10/23/18 110/70   10/10/18 122/80   12/21/17 126/80    Weight from Last 3 Encounters:   10/23/18 68 kg (150 lb)   10/10/18 67.2 kg (148 lb 1.6 oz)   12/21/17  68 kg (150 lb)              We Performed the Following     Therapeutic Activities     Therapeutic Exercises        Primary Care Provider Office Phone # Fax #    Ida Lyles [phone]       [address] [facility] [address]        Equal Access to Services     GIACORTEZ VASILE : Hadii aad ku hadbeno Soomaali, waaxda luqadaha, qaybta kaalmada adeegyada, waxay idiin hayjhonn adelorena gilbert laayalagamaliel . So [facility] [phone].    ATENCIÓN: Si habla español, tiene a witt disposición servicios gratuitos de asistencia lingüística. Llame al [phone].    We comply with applicable federal civil rights laws and [place] laws. We do not discriminate on the basis of race, color, national origin, age, disability, sex, sexual orientation, or gender identity.            Thank you!     Thank you for choosing [facility]ERAP[facility]  for your care. Our goal is always to provide you with excellent care. Hearing back from our patients is one way we can continue to improve our services. Please take a few minutes to complete the written survey that you may receive in the mail after your visit with us. Thank you!             Your Updated Medication List - Protect others around you: Learn how to safely use, store and throw away your medicines at www.disposemymeds.org.          This list is accurate as of 10/29/18  1:22 PM.  Always use your most recent med list.                   Brand Name Dispense Instructions for use Diagnosis    atorvastatin 20 MG tablet    LIPITOR    90 tablet    Take 1 tablet (20 mg) by mouth daily    Pure hypercholesterolemia       CALCIUM + D PO      Take  by mouth. Plus magnesium        omeprazole 40 MG capsule    priLOSEC    90 capsule    Take 1 capsule (40 mg) by mouth daily    Gastroesophageal reflux disease, esophagitis presence not specified       ranitidine 150 MG/10ML syrup    Zantac    600 mL    Take 10 mLs (150 mg) by mouth 2 times daily     Gastroesophageal reflux disease, esophagitis presence not specified       triamcinolone 0.1 % ointment    KENALOG    15 g    Apply sparingly to affected area three times daily for 14 days.    Eczema of eyelid, unspecified laterality       vitamin D 2000 units tablet      Take 1 tablet by mouth daily.    Gastritis, GERD (gastroesophageal reflux disease)

## 2019-01-03 DIAGNOSIS — K21.9 GASTROESOPHAGEAL REFLUX DISEASE, ESOPHAGITIS PRESENCE NOT SPECIFIED: ICD-10-CM

## 2019-01-03 DIAGNOSIS — E78.00 PURE HYPERCHOLESTEROLEMIA: ICD-10-CM

## 2019-01-04 RX ORDER — OMEPRAZOLE 40 MG/1
CAPSULE, DELAYED RELEASE ORAL
Qty: 90 CAPSULE | Refills: 2 | Status: SHIPPED | OUTPATIENT
Start: 2019-01-04 | End: 2019-09-02

## 2019-01-04 RX ORDER — ATORVASTATIN CALCIUM 20 MG/1
TABLET, FILM COATED ORAL
Qty: 90 TABLET | Refills: 2 | Status: SHIPPED | OUTPATIENT
Start: 2019-01-04 | End: 2019-09-02

## 2019-01-04 NOTE — TELEPHONE ENCOUNTER
"Requested Prescriptions   Pending Prescriptions Disp Refills     omeprazole (PRILOSEC) 40 MG DR capsule [Pharmacy Med Name: OMEPRAZOLE  40MG  CAP] 90 capsule 3    Last Written Prescription Date:  10/23/2018  Last Fill Quantity: 90,  # refills: 3   Last Office Visit: 10/23/2018   Future Office Visit:      Sig: TAKE 1 CAPSULE BY MOUTH  DAILY TAKE 30-60 MINUTES  BEFORE A MEAL.    PPI Protocol Passed - 1/3/2019  8:03 PM       Passed - Not on Clopidogrel (unless Pantoprazole ordered)       Passed - No diagnosis of osteoporosis on record       Passed - Recent (12 mo) or future (30 days) visit within the authorizing provider's specialty    Patient had office visit in the last 12 months or has a visit in the next 30 days with authorizing provider or within the authorizing provider's specialty.  See \"Patient Info\" tab in inbasket, or \"Choose Columns\" in Meds & Orders section of the refill encounter.             Passed - Patient is age 18 or older       Passed - No active pregnacy on record       Passed - No positive pregnancy test in past 12 months        atorvastatin (LIPITOR) 20 MG tablet [Pharmacy Med Name: ATORVASTATIN  20MG  TAB] 90 tablet 3    Last Written Prescription Date:  10/23/2018  Last Fill Quantity: 90,  # refills: 3   Last Office Visit: 10/23/2018   Future Office Visit:      Sig: TAKE 1 TABLET BY MOUTH  DAILY    Statins Protocol Passed - 1/3/2019  8:03 PM       Passed - LDL on file in past 12 months    Recent Labs   Lab Test 10/17/18  0833   LDL 79            Passed - No abnormal creatine kinase in past 12 months    No lab results found.            Passed - Recent (12 mo) or future (30 days) visit within the authorizing provider's specialty    Patient had office visit in the last 12 months or has a visit in the next 30 days with authorizing provider or within the authorizing provider's specialty.  See \"Patient Info\" tab in inbasket, or \"Choose Columns\" in Meds & Orders section of the refill encounter.          "    Passed - Patient is age 18 or older       Passed - No active pregnancy on record       Passed - No positive pregnancy test in past 12 months

## 2019-01-29 PROBLEM — M54.16 BILATERAL LUMBAR RADICULOPATHY: Status: RESOLVED | Noted: 2018-10-23 | Resolved: 2019-01-29

## 2019-01-29 NOTE — PROGRESS NOTES
Debby HAGER Kyleracheal Chan was seen 2 times for evaluation and treatment.  Patient did not return for further treatment and current status is unknown.  Due to short treatment duration, no objective or functional changes were made.  Please see goal flow sheet from episode noted date below and initial evaluation for further information.    Linked Episodes   Type: Episode: Status: Noted: Resolved: Last update: Updated by:   PHYSICAL THERAPY bilateral lumbar radiculopathy 10-22-18 Active 10/22/2018  10/29/2018  8:43 AM Verónica Kahn, PT      Comments:

## 2019-04-10 ENCOUNTER — OFFICE VISIT (OUTPATIENT)
Dept: DERMATOLOGY | Facility: CLINIC | Age: 69
End: 2019-04-10
Payer: COMMERCIAL

## 2019-04-10 VITALS — HEART RATE: 74 BPM | SYSTOLIC BLOOD PRESSURE: 130 MMHG | DIASTOLIC BLOOD PRESSURE: 74 MMHG | OXYGEN SATURATION: 97 %

## 2019-04-10 DIAGNOSIS — L82.1 SEBORRHEIC KERATOSES: ICD-10-CM

## 2019-04-10 DIAGNOSIS — L57.8 SOLAR ELASTOSIS: ICD-10-CM

## 2019-04-10 DIAGNOSIS — L82.0 INFLAMED SEBORRHEIC KERATOSIS: ICD-10-CM

## 2019-04-10 DIAGNOSIS — Z80.8 FAMILY HISTORY OF MELANOMA: Primary | ICD-10-CM

## 2019-04-10 DIAGNOSIS — L57.0 ACTINIC KERATOSES: ICD-10-CM

## 2019-04-10 DIAGNOSIS — D18.01 CHERRY ANGIOMA: ICD-10-CM

## 2019-04-10 DIAGNOSIS — D22.9 MULTIPLE NEVI: ICD-10-CM

## 2019-04-10 DIAGNOSIS — L81.4 LENTIGINES: ICD-10-CM

## 2019-04-10 PROCEDURE — 17110 DESTRUCTION B9 LES UP TO 14: CPT | Performed by: PHYSICIAN ASSISTANT

## 2019-04-10 PROCEDURE — 17003 DESTRUCT PREMALG LES 2-14: CPT | Mod: 59 | Performed by: PHYSICIAN ASSISTANT

## 2019-04-10 PROCEDURE — 99214 OFFICE O/P EST MOD 30 MIN: CPT | Mod: 25 | Performed by: PHYSICIAN ASSISTANT

## 2019-04-10 PROCEDURE — 17000 DESTRUCT PREMALG LESION: CPT | Mod: 59 | Performed by: PHYSICIAN ASSISTANT

## 2019-04-10 NOTE — PROGRESS NOTES
HPI:  Debby Chan is a 68 year old female patient here today for brown spot on left cheek. Patient states this has been present for a while.  Patient reports the following symptoms: none .  Patient reports the following previous treatments: laser with great improvement.  Patient reports the following modifying factors: none.  Associated symptoms: none.  Patient has no other skin complaints today.  Remainder of the HPI, Meds, PMH, Allergies, FH, and SH was reviewed in chart.    Pertinent Hx:   Sister had melanoma. Father skin cancer unsure of type.   Past Medical History:   Diagnosis Date     Acid reflux disease      Pure hypercholesterolemia        Past Surgical History:   Procedure Laterality Date     BREAST BIOPSY, RT/LT Right 1985    benign fibroadenoma     ESOPHAGOSCOPY, GASTROSCOPY, DUODENOSCOPY (EGD), COMBINED N/A 12/21/2017    Procedure: COMBINED ESOPHAGOSCOPY, GASTROSCOPY, DUODENOSCOPY (EGD), BIOPSY SINGLE OR MULTIPLE;  gastroscopy;  Surgeon: Srinivasa Jackson MD;  Location:  GI     TUBAL LIGATION  1990        Family History   Problem Relation Age of Onset     Myocardial Infarction Father         later in life; s/p PCI     Prostate Cancer Father      Diabetes Type 2  Father      Skin Cancer Father      Heart Failure Mother      Uterine Cancer Paternal Aunt      Melanoma Sister      Cerebrovascular Disease No family hx of      Coronary Artery Disease Early Onset No family hx of      Ovarian Cancer No family hx of      Colon Cancer No family hx of        Social History     Socioeconomic History     Marital status:      Spouse name: Not on file     Number of children: Not on file     Years of education: Not on file     Highest education level: Not on file   Occupational History     Occupation: Part-time at [employer]     Occupation: Retired from [employer]   Social Needs     Financial resource strain: Not on file     Food insecurity:     Worry: Not on file     Inability: Not on file      Transportation needs:     Medical: Not on file     Non-medical: Not on file   Tobacco Use     Smoking status: Never Smoker     Smokeless tobacco: Never Used   Substance and Sexual Activity     Alcohol use: Yes     Comment: ~3 glasses of wine/week     Drug use: No     Sexual activity: Not Currently     Partners: Male   Lifestyle     Physical activity:     Days per week: Not on file     Minutes per session: Not on file     Stress: Not on file   Relationships     Social connections:     Talks on phone: Not on file     Gets together: Not on file     Attends Mormonism service: Not on file     Active member of club or organization: Not on file     Attends meetings of clubs or organizations: Not on file     Relationship status: Not on file     Intimate partner violence:     Fear of current or ex partner: Not on file     Emotionally abused: Not on file     Physically abused: Not on file     Forced sexual activity: Not on file   Other Topics Concern     Parent/sibling w/ CABG, MI or angioplasty before 65F 55M? No   Social History Narrative    .    No kids.    No formal exercise, but stays active.        Outpatient Encounter Medications as of 4/10/2019   Medication Sig Dispense Refill     atorvastatin (LIPITOR) 20 MG tablet TAKE 1 TABLET BY MOUTH  DAILY 90 tablet 2     Calcium Carbonate-Vitamin D (CALCIUM + D PO) Take  by mouth. Plus magnesium       Cholecalciferol (VITAMIN D) 2000 UNITS tablet Take 1 tablet by mouth daily.       omeprazole (PRILOSEC) 40 MG DR capsule TAKE 1 CAPSULE BY MOUTH  DAILY TAKE 30-60 MINUTES  BEFORE A MEAL. 90 capsule 2     ranitidine (ZANTAC) 150 MG/10ML syrup Take 10 mLs (150 mg) by mouth 2 times daily 600 mL 0     triamcinolone (KENALOG) 0.1 % ointment Apply sparingly to affected area three times daily for 14 days. 15 g 1     No facility-administered encounter medications on file as of 4/10/2019.        Review Of Systems:  Skin: As above  Eyes: negative  Ears/Nose/Throat:  negative  Respiratory: No shortness of breath, dyspnea on exertion, cough, or hemoptysis  Cardiovascular: negative  Gastrointestinal: negative  Genitourinary: negative  Musculoskeletal: negative  Neurologic: negative  Psychiatric: negative  Hematologic/Lymphatic/Immunologic: negative  Endocrine: negative      Objective:     /74   Pulse 74   SpO2 97%   Eyes: Conjunctivae/lids: Normal   ENT: Lips:  Normal  MSK: Normal  Cardiovascular: Peripheral edema none  Pulm: Breathing Normal  Neuro/Psych: Orientation: Normal; Mood/Affect: Normal, NAD, WDWN  Pt accompanied by: self  Following areas examined: Scalp, face, eyelids, lips, neck, chest, abdomen, back, buttocks, and R&L upper and lower extremities.  Mae skin type:i   Findings:  Red smooth well-defined macules on trunk and extremities.  Brown, stuck-on scaly appearing papules on trunk and extremities.  Well circumscribed macules with symmetric color distribution on trunk and extremities.  Edgar WD smooth patch on left cheek and macules on face, neck, trunk, and extremities.  Actinic keratoses x 6 on upper cutaneous lip  Rhytides, hypo/hyperpigmentation, and atrophy of face    Assessment and Plan:     1) Cherry angiomas, Seborrheic keratoses, Benign nevi, Lentigines     I discussed the specifics of tumor, prognosis, and genetics of benign lesions.  I explained that treatment of these lesions would be purely cosmetic and not medically neccessary.  I discussed with patient different removal options including excision, cryotherapy, cautery and /or laser.  Lesion may recur and/or may not completely resolve. May need additional treatment.   Disc laser, hydroquinone, and liquid nitrogen  Treatment of these lesions would be purely cosmetic and not medically neccessary.  Lesion may recur and/or may not completely resolve. May need additional treatment.    Call me for topical if you want.    2) Family history of MM  Signs and Symptoms of non-melanoma skin cancer and  ABCDEs of melanoma reviewed with patient. Patient encouraged to perform monthly self skin exams and educated on how to perform them. UV precautions reviewed with patient. Patient was asked about new or changing moles/lesions on body.   Wear a sunscreen with at least SPF 30 on your face, ears, neck and V of the chest daily. Wear sunscreen on other areas of the body if those areas are exposed to the sun throughout the day. Sunscreens can contain physical and/or chemical blockers. Physical blockers are less likely to clog pores, these include zinc oxide and titanium dioxide. Reapply every two hour and after swimming. Sunscreen examples include Neutrogena, CeraVe, Blue Lizard, Elta MD and many others.    3) ISK x 1  Disc etiology and course.  LN2: Treated with LN2 for 5s for 1-2 cycles. Warned risks of blistering, pain, pigment change, scarring, and incomplete resolution.  Advised patient to return if lesions do not completely resolve within 2-3 months.  Wound care sheet given.      4) actinic keratoses x 6 and solar elastosis  LN2 for 5 seconds x 2. Discussed AE include hypopigmentation (white spot) and recurrence. Follow up in 2-3 months to recheck lesions. There is a 0.025%-20% chance that AKs can develop into a SCC.   Treatment options include LN2 vs PDT vs Efudex. Pt elected LN2      Proper skin care from [facility]:    -Eliminate harsh soaps as they strip the natural oils from the skin, often resulting in dry itchy skin ( i.e. Dial, Zest, Josie Spring)  -Use mild soaps such as Cetaphil or Dove Sensitive Skin in the shower. You do not need to use soap on arms, legs, and trunk every time you shower unless visibly soiled.   -Avoid hot or cold showers.  -After showering, lightly dry off and apply moisturizing within 2-3 minutes. This will help trap moisture in the skin.   -Aggressive use of a moisturizer at least 1-2 times a day to the entire body (including -Vanicream, Cetaphil, Aquaphor or Cerave) and  moisturize hands after every washing.  -We recommend using moisturizers that come in a tub that needs to be scooped out, not a pump. This has more of an oil base. It will hold moisture in your skin much better than a water base moisturizer. The above recommended are non-pore clogging.               Follow up in yearly FBE. 1-2 month to recheck aks.

## 2019-04-10 NOTE — PATIENT INSTRUCTIONS
WOUND CARE INSTRUCTIONS  FOR CRYOSURGERY    This area treated with liquid nitrogen will form a blister. You do not need to bandage the area until after the blister forms and breaks (which may be a few days).  When the blister breaks, begin daily dressing changes as follows:    1) Clean and dry the area with tap water using clean Q-tip or sterile gauze pad.    2) Apply Aquaphor, Vaseline, Polysporin ointment or Bacitracin ointment over entire wound.  Do NOT use Neosporin ointment.    3) Cover the wound with a band-aid or sterile non-stick gauze pad and micropore paper tape.      REPEAT THESE INSTRUCTIONS AT LEAST ONCE A DAY UNTIL THE WOUND HAS COMPLETELY HEALED.        It is an old wives tale that a wound heals better when it is exposed to air and allowed to dry out. The wound will heal faster with a better cosmetic result if it is kept moist with ointment and covered with a bandage.  Do not let the wound dry out.      Supplies Needed:     *Cotton tipped applicators (Q-tips)   *Polysporin ointment or Bacitracin ointment (NOT NEOSPORIN)   *Band-aids, or non stick gauze pads and micropore paper tape    PATIENT INFORMATION    During the healing process you will notice a number of changes. All wounds develop a small halo of redness surrounding the wound.  This means healing is occurring. Severe itching with extensive redness usually indicates sensitivity to the ointment or bandage tape used to dress the wound.  You should call our office if this develops.      Swelling and/or discoloration around your surgical site is common, particularly when performed around the eye.    All wounds normally drain.  The larger the wound the more drainage there will be.  After 7-10 days, you will notice the wound beginning to shrink and new skin will begin to grow.  The wound is healed when you can see skin has formed over the entire area.  A healed wound has a healthy, shiny look to the surface and is red to dark pink in color to  normalize.  Wounds may take approximately 4-6 weeks to heal.  Larger wounds may take 6-8 weeks.  After the wound is healed you may discontinue dressing changes.    You may experience a sensation of tightness as your wound heals. This is normal and will gradually subside.    Your healed wound may be sensitive to temperature changes. This sensitivity improves with time, but if you re having a lot of discomfort, try to avoid temperature extremes.    Patients frequently experience itching after their wound appears to have healed because of the continue healing under the skin.  Plain Vaseline will help relieve the itching.           Proper skin care from [facility]:    -Eliminate harsh soaps as they strip the natural oils from the skin, often resulting in dry itchy skin ( i.e. Dial, Zest, Josie Spring)  -Use mild soaps such as Cetaphil or Dove Sensitive Skin in the shower. You do not need to use soap on arms, legs, and trunk every time you shower unless visibly soiled.   -Avoid hot or cold showers.  -After showering, lightly dry off and apply moisturizing within 2-3 minutes. This will help trap moisture in the skin.   -Aggressive use of a moisturizer at least 1-2 times a day to the entire body (including -Vanicream, Cetaphil, Aquaphor or Cerave) and moisturize hands after every washing.  -We recommend using moisturizers that come in a tub that needs to be scooped out, not a pump. This has more of an oil base. It will hold moisture in your skin much better than a water base moisturizer. The above recommended are non-pore clogging.    Sunscreen:   Wear a sunscreen with at least SPF 30 on your face, ears, neck and V of the chest daily. Wear sunscreen on other areas of the body if those areas are exposed to the sun throughout the day. Sunscreens can contain physical and/or chemical blockers. Physical blockers are less likely to clog pores, these include zinc oxide and titanium dioxide. Reapply every two hour and  after swimming. Sunscreen examples include Neutrogena, CeraVe, Jovanny Escudero, Elta MD and many others.    UV radiation  UVA radiation remains constant throughout the day and throughout the year. It is a longer wavelength than UVB and therefore penetrates deeper into the skin leading to immediate and delayed tanning, photoaging, and skin cancer. 70-80% of UVA and UVB radiation occurs between the hours of 10am-2pm.  UVB radiation  UVB radiation causes the most harmful effects and is more significant during the summer months. However, snow and ice can reflect UVB radiation leading to skin damage during the winter months as well. UVB radiation is responsible for tanning, burning, inflammation, delayed erythema (pinkness), pigmentation (brown spots), and skin cancer.

## 2019-04-10 NOTE — LETTER
4/10/2019         RE: Debby Chan  [address]  [facility] [address]        Dear Colleague,    Thank you for referring your patient, Debby Chan, to the [facility]. Please see a copy of my visit note below.    HPI:  Debby Chan is a 68 year old female patient here today for brown spot on left cheek. Patient states this has been present for a while.  Patient reports the following symptoms: none .  Patient reports the following previous treatments: laser with great improvement.  Patient reports the following modifying factors: none.  Associated symptoms: none.  Patient has no other skin complaints today.  Remainder of the HPI, Meds, PMH, Allergies, FH, and SH was reviewed in chart.    Pertinent Hx:   Sister had melanoma. Father skin cancer unsure of type.   Past Medical History:   Diagnosis Date     Acid reflux disease      Pure hypercholesterolemia        Past Surgical History:   Procedure Laterality Date     BREAST BIOPSY, RT/LT Right 1985    benign fibroadenoma     ESOPHAGOSCOPY, GASTROSCOPY, DUODENOSCOPY (EGD), COMBINED N/A 12/21/2017    Procedure: COMBINED ESOPHAGOSCOPY, GASTROSCOPY, DUODENOSCOPY (EGD), BIOPSY SINGLE OR MULTIPLE;  gastroscopy;  Surgeon: Srinivasa Jackson MD;  Location:  GI     TUBAL LIGATION  1990        Family History   Problem Relation Age of Onset     Myocardial Infarction Father         later in life; s/p PCI     Prostate Cancer Father      Diabetes Type 2  Father      Skin Cancer Father      Heart Failure Mother      Uterine Cancer Paternal Aunt      Melanoma Sister      Cerebrovascular Disease No family hx of      Coronary Artery Disease Early Onset No family hx of      Ovarian Cancer No family hx of      Colon Cancer No family hx of        Social History     Socioeconomic History     Marital status:      Spouse name: Not on file     Number of children: Not on file     Years of education: Not on file     Highest  education level: Not on file   Occupational History     Occupation: Part-time at [employer]     Occupation: Retired from [employer]   Social Needs     Financial resource strain: Not on file     Food insecurity:     Worry: Not on file     Inability: Not on file     Transportation needs:     Medical: Not on file     Non-medical: Not on file   Tobacco Use     Smoking status: Never Smoker     Smokeless tobacco: Never Used   Substance and Sexual Activity     Alcohol use: Yes     Comment: ~3 glasses of wine/week     Drug use: No     Sexual activity: Not Currently     Partners: Male   Lifestyle     Physical activity:     Days per week: Not on file     Minutes per session: Not on file     Stress: Not on file   Relationships     Social connections:     Talks on phone: Not on file     Gets together: Not on file     Attends Mandaen service: Not on file     Active member of club or organization: Not on file     Attends meetings of clubs or organizations: Not on file     Relationship status: Not on file     Intimate partner violence:     Fear of current or ex partner: Not on file     Emotionally abused: Not on file     Physically abused: Not on file     Forced sexual activity: Not on file   Other Topics Concern     Parent/sibling w/ CABG, MI or angioplasty before 65F 55M? No   Social History Narrative    .    No kids.    No formal exercise, but stays active.        Outpatient Encounter Medications as of 4/10/2019   Medication Sig Dispense Refill     atorvastatin (LIPITOR) 20 MG tablet TAKE 1 TABLET BY MOUTH  DAILY 90 tablet 2     Calcium Carbonate-Vitamin D (CALCIUM + D PO) Take  by mouth. Plus magnesium       Cholecalciferol (VITAMIN D) 2000 UNITS tablet Take 1 tablet by mouth daily.       omeprazole (PRILOSEC) 40 MG DR capsule TAKE 1 CAPSULE BY MOUTH  DAILY TAKE 30-60 MINUTES  BEFORE A MEAL. 90 capsule 2     ranitidine (ZANTAC) 150 MG/10ML syrup Take 10 mLs (150 mg) by mouth 2 times daily 600 mL 0     triamcinolone  (KENALOG) 0.1 % ointment Apply sparingly to affected area three times daily for 14 days. 15 g 1     No facility-administered encounter medications on file as of 4/10/2019.        Review Of Systems:  Skin: As above  Eyes: negative  Ears/Nose/Throat: negative  Respiratory: No shortness of breath, dyspnea on exertion, cough, or hemoptysis  Cardiovascular: negative  Gastrointestinal: negative  Genitourinary: negative  Musculoskeletal: negative  Neurologic: negative  Psychiatric: negative  Hematologic/Lymphatic/Immunologic: negative  Endocrine: negative      Objective:     /74   Pulse 74   SpO2 97%   Eyes: Conjunctivae/lids: Normal   ENT: Lips:  Normal  MSK: Normal  Cardiovascular: Peripheral edema none  Pulm: Breathing Normal  Neuro/Psych: Orientation: Normal; Mood/Affect: Normal, NAD, WDWN  Pt accompanied by: self  Following areas examined: Scalp, face, eyelids, lips, neck, chest, abdomen, back, buttocks, and R&L upper and lower extremities.  Mae skin type:i   Findings:  Red smooth well-defined macules on trunk and extremities.  Brown, stuck-on scaly appearing papules on trunk and extremities.  Well circumscribed macules with symmetric color distribution on trunk and extremities.  Edgar WD smooth patch on left cheek and macules on face, neck, trunk, and extremities.  Actinic keratoses x 6 on upper cutaneous lip  Rhytides, hypo/hyperpigmentation, and atrophy of face    Assessment and Plan:     1) Cherry angiomas, Seborrheic keratoses, Benign nevi, Lentigines     I discussed the specifics of tumor, prognosis, and genetics of benign lesions.  I explained that treatment of these lesions would be purely cosmetic and not medically neccessary.  I discussed with patient different removal options including excision, cryotherapy, cautery and /or laser.  Lesion may recur and/or may not completely resolve. May need additional treatment.   Disc laser, hydroquinone, and liquid nitrogen  Treatment of these lesions would  be purely cosmetic and not medically neccessary.  Lesion may recur and/or may not completely resolve. May need additional treatment.    Call me for topical if you want.    2) Family history of MM  Signs and Symptoms of non-melanoma skin cancer and ABCDEs of melanoma reviewed with patient. Patient encouraged to perform monthly self skin exams and educated on how to perform them. UV precautions reviewed with patient. Patient was asked about new or changing moles/lesions on body.   Wear a sunscreen with at least SPF 30 on your face, ears, neck and V of the chest daily. Wear sunscreen on other areas of the body if those areas are exposed to the sun throughout the day. Sunscreens can contain physical and/or chemical blockers. Physical blockers are less likely to clog pores, these include zinc oxide and titanium dioxide. Reapply every two hour and after swimming. Sunscreen examples include Neutrogena CeraVe, Blue Lizard, Elta MD and many others.    3) ISK x 1  Disc etiology and course.  LN2: Treated with LN2 for 5s for 1-2 cycles. Warned risks of blistering, pain, pigment change, scarring, and incomplete resolution.  Advised patient to return if lesions do not completely resolve within 2-3 months.  Wound care sheet given.      4) actinic keratoses x 6 and solar elastosis  LN2 for 5 seconds x 2. Discussed AE include hypopigmentation (white spot) and recurrence. Follow up in 2-3 months to recheck lesions. There is a 0.025%-20% chance that AKs can develop into a SCC.   Treatment options include LN2 vs PDT vs Efudex. Pt elected LN2      Proper skin care from [facility]:    -Eliminate harsh soaps as they strip the natural oils from the skin, often resulting in dry itchy skin ( i.e. Dial, Zest, Georgian Spring)  -Use mild soaps such as Cetaphil or Dove Sensitive Skin in the shower. You do not need to use soap on arms, legs, and trunk every time you shower unless visibly soiled.   -Avoid hot or cold showers.  -After  showering, lightly dry off and apply moisturizing within 2-3 minutes. This will help trap moisture in the skin.   -Aggressive use of a moisturizer at least 1-2 times a day to the entire body (including -Vanicream, Cetaphil, Aquaphor or Cerave) and moisturize hands after every washing.  -We recommend using moisturizers that come in a tub that needs to be scooped out, not a pump. This has more of an oil base. It will hold moisture in your skin much better than a water base moisturizer. The above recommended are non-pore clogging.               Follow up in yearly FBE. 1-2 month to recheck aks.      Again, thank you for allowing me to participate in the care of your patient.        Sincerely,        Ama Kapoor PA-C

## 2019-05-23 ENCOUNTER — OFFICE VISIT (OUTPATIENT)
Dept: INTERNAL MEDICINE | Facility: CLINIC | Age: 69
End: 2019-05-23
Payer: COMMERCIAL

## 2019-05-23 VITALS
DIASTOLIC BLOOD PRESSURE: 84 MMHG | TEMPERATURE: 98.4 F | BODY MASS INDEX: 25.91 KG/M2 | WEIGHT: 153.3 LBS | SYSTOLIC BLOOD PRESSURE: 136 MMHG | HEART RATE: 86 BPM | RESPIRATION RATE: 16 BRPM | OXYGEN SATURATION: 98 %

## 2019-05-23 DIAGNOSIS — R07.9 CHEST PAIN, UNSPECIFIED TYPE: Primary | ICD-10-CM

## 2019-05-23 DIAGNOSIS — K21.9 GASTROESOPHAGEAL REFLUX DISEASE, ESOPHAGITIS PRESENCE NOT SPECIFIED: ICD-10-CM

## 2019-05-23 PROCEDURE — 93000 ELECTROCARDIOGRAM COMPLETE: CPT | Performed by: INTERNAL MEDICINE

## 2019-05-23 PROCEDURE — 99214 OFFICE O/P EST MOD 30 MIN: CPT | Performed by: INTERNAL MEDICINE

## 2019-05-23 RX ORDER — SUCRALFATE 1 G/1
1 TABLET ORAL 4 TIMES DAILY
Qty: 30 TABLET | Refills: 0 | Status: SHIPPED | OUTPATIENT
Start: 2019-05-23 | End: 2019-11-05

## 2019-05-23 NOTE — PATIENT INSTRUCTIONS
Continue omeprazole 40mg daily.    Continue Zantac twice daily.    START carafate/sucralfate 4x/day as needed for pain relief.    STOP using Motrin, Advil, ibuprofen, naproxen, Aleve, aspirin, Excedrin.

## 2019-05-23 NOTE — PROGRESS NOTES
SUBJECTIVE:                                                      HPI: Debby Chan is a pleasant 68 year old female who presents with chest pain:    - on and off for the last several days  - substernal without radiation  - burning in quality  - unrelated to activity/exertion, unrelieved by rest  - has been waking her up at night  - associated nausea, no vomiting  - associated hot flashes    - no shortness of breath at rest, but more winded than usual with exertion  - no diaphoresis  - no neck, jaw, or left arm pain  - no palpitations, light-headedness, or pre-syncope    PMH significant for acid reflux - on daily omeprazole 40mg. Has also been using Zantac bid for the last several days.    Medications significant for nightly Excedrin PM and Motrin as needed (last dose a couple days ago).     The medication, allergy, and problem lists have been reviewed and updated as appropriate.     OBJECTIVE:                                                      /84   Pulse 86   Temp 98.4  F (36.9  C) (Oral)   Resp 16   Wt 69.5 kg (153 lb 4.8 oz)   SpO2 98%   BMI 25.91 kg/m    Constitutional: well-appearing  Respiratory: normal respiratory effort; clear to auscultation bilaterally  Cardiovascular: regular rate and rhythm; no edema  Gastrointestinal: soft, non-tender, non-distended, and bowel sounds present; no organomegaly or masses   Musculoskeletal: normal gait and station  Psych: normal judgment and insight; anxious mood and affect; recent and remote memory intact    EKG (today): NSR - no ischemic changes    ASSESSMENT/PLAN:                                                      (R07.9) Chest pain, unspecified type  (primary encounter diagnosis)  (K21.9) Gastroesophageal reflux disease, esophagitis presence not specified  Comment: normal EKG and exam; history not suggestive of ACS; suspect nightly Excedrin use + as needed (and recent) Motrin use have exacerbated underlying acid reflux and/or caused  gastritis.   Plan:    - CONTINUE omeprazole 40mg daily.   - CONTINUE Zantac bid (scheduled) for the next 1-2 weeks (longer as needed).   - START Carafate 4x/day as needed for additional pain relief.   - patient encouraged avoid NSAIDs (Excedrin, Motrin).     The instructions on the AVS were discussed and explained to the patient. Patient expressed understanding of instructions.    (Chart documentation was completed, in part, with [employer] voice-recognition software. Even though reviewed, some grammatical, spelling, and word errors may remain.)    Ida Lyles MD   [address] [address]  T: [phone], F: [phone]

## 2019-06-26 ENCOUNTER — MYC REFILL (OUTPATIENT)
Dept: INTERNAL MEDICINE | Facility: CLINIC | Age: 69
End: 2019-06-26

## 2019-06-26 DIAGNOSIS — K21.9 GASTROESOPHAGEAL REFLUX DISEASE, ESOPHAGITIS PRESENCE NOT SPECIFIED: ICD-10-CM

## 2019-06-26 NOTE — TELEPHONE ENCOUNTER
"Requested Prescriptions   Pending Prescriptions Disp Refills     ranitidine (ZANTAC) 150 MG/10ML syrup 600 mL 0     Sig: Take 10 mLs (150 mg) by mouth 2 times daily       H2 Blockers Protocol Passed - 6/26/2019  9:34 AM        Passed - Patient is age 12 or older        Passed - Recent (12 mo) or future (30 days) visit within the authorizing provider's specialty     Patient had office visit in the last 12 months or has a visit in the next 30 days with authorizing provider or within the authorizing provider's specialty.  See \"Patient Info\" tab in inbasket, or \"Choose Columns\" in Meds & Orders section of the refill encounter.              Passed - Medication is active on med list          "

## 2019-07-17 ENCOUNTER — OFFICE VISIT (OUTPATIENT)
Dept: DERMATOLOGY | Facility: CLINIC | Age: 69
End: 2019-07-17
Payer: COMMERCIAL

## 2019-07-17 VITALS — HEART RATE: 79 BPM | SYSTOLIC BLOOD PRESSURE: 135 MMHG | OXYGEN SATURATION: 98 % | DIASTOLIC BLOOD PRESSURE: 72 MMHG

## 2019-07-17 DIAGNOSIS — L82.1 SEBORRHEIC KERATOSIS: ICD-10-CM

## 2019-07-17 DIAGNOSIS — L30.9 DERMATITIS: Primary | ICD-10-CM

## 2019-07-17 DIAGNOSIS — L57.8 SOLAR ELASTOSIS: ICD-10-CM

## 2019-07-17 DIAGNOSIS — L57.0 ACTINIC KERATOSIS: ICD-10-CM

## 2019-07-17 PROCEDURE — 17003 DESTRUCT PREMALG LES 2-14: CPT | Performed by: PHYSICIAN ASSISTANT

## 2019-07-17 PROCEDURE — 99213 OFFICE O/P EST LOW 20 MIN: CPT | Mod: 25 | Performed by: PHYSICIAN ASSISTANT

## 2019-07-17 PROCEDURE — 17000 DESTRUCT PREMALG LESION: CPT | Performed by: PHYSICIAN ASSISTANT

## 2019-07-17 RX ORDER — TRIAMCINOLONE ACETONIDE 0.25 MG/G
CREAM TOPICAL 2 TIMES DAILY
Qty: 15 G | Refills: 0 | Status: SHIPPED | OUTPATIENT
Start: 2019-07-17 | End: 2022-12-16 | Stop reason: ALTCHOICE

## 2019-07-17 NOTE — PROGRESS NOTES
HPI:  Debby Chan is a 69 year old female patient here today for follow up aks on upper cutaneous lip.  Patient states this has been present for a while.  Patient reports the following symptoms: improvement .Patient reports the following previous treatments: ln2.  Patient reports the following modifying factors: none.  Associated symptoms: none.  Also has a rash on inferior eyelids for a while. Itchy and burns. Noticed a new spot on right temple area for a few days?. Not bothersome. No tx tried. Patient has no other skin complaints today.  Remainder of the HPI, Meds, PMH, Allergies, FH, and SH was reviewed in chart.    Pertinent Hx:   Sister had melanoma. Father skin cancer unsure of type.   Past Medical History:   Diagnosis Date     Acid reflux disease      Pure hypercholesterolemia        Past Surgical History:   Procedure Laterality Date     BREAST BIOPSY, RT/LT Right 1985    benign fibroadenoma     ESOPHAGOSCOPY, GASTROSCOPY, DUODENOSCOPY (EGD), COMBINED N/A 12/21/2017    Procedure: COMBINED ESOPHAGOSCOPY, GASTROSCOPY, DUODENOSCOPY (EGD), BIOPSY SINGLE OR MULTIPLE;  gastroscopy;  Surgeon: Srinivasa Jackson MD;  Location:  GI     TUBAL LIGATION  1990        Family History   Problem Relation Age of Onset     Myocardial Infarction Father         later in life; s/p PCI     Prostate Cancer Father      Diabetes Type 2  Father      Skin Cancer Father      Heart Failure Mother      Uterine Cancer Paternal Aunt      Melanoma Sister      Cerebrovascular Disease No family hx of      Coronary Artery Disease Early Onset No family hx of      Ovarian Cancer No family hx of      Colon Cancer No family hx of        Social History     Socioeconomic History     Marital status:      Spouse name: Not on file     Number of children: Not on file     Years of education: Not on file     Highest education level: Not on file   Occupational History     Occupation: Part-time at [employer]     Occupation: Retired from  [employer]   Social Needs     Financial resource strain: Not on file     Food insecurity:     Worry: Not on file     Inability: Not on file     Transportation needs:     Medical: Not on file     Non-medical: Not on file   Tobacco Use     Smoking status: Never Smoker     Smokeless tobacco: Never Used   Substance and Sexual Activity     Alcohol use: Yes     Comment: ~3 glasses of wine/week     Drug use: No     Sexual activity: Not Currently     Partners: Male   Lifestyle     Physical activity:     Days per week: Not on file     Minutes per session: Not on file     Stress: Not on file   Relationships     Social connections:     Talks on phone: Not on file     Gets together: Not on file     Attends [employer] service: Not on file     Active member of club or organization: Not on file     Attends meetings of clubs or organizations: Not on file     Relationship status: Not on file     Intimate partner violence:     Fear of current or ex partner: Not on file     Emotionally abused: Not on file     Physically abused: Not on file     Forced sexual activity: Not on file   Other Topics Concern     Parent/sibling w/ CABG, MI or angioplasty before 65F 55M? No   Social History Narrative    .    No kids.    No formal exercise, but stays active.        Outpatient Encounter Medications as of 7/17/2019   Medication Sig Dispense Refill     atorvastatin (LIPITOR) 20 MG tablet TAKE 1 TABLET BY MOUTH  DAILY 90 tablet 2     Calcium Carbonate-Vitamin D (CALCIUM + D PO) Take  by mouth. Plus magnesium       Cholecalciferol (VITAMIN D) 2000 UNITS tablet Take 1 tablet by mouth daily.       omeprazole (PRILOSEC) 40 MG DR capsule TAKE 1 CAPSULE BY MOUTH  DAILY TAKE 30-60 MINUTES  BEFORE A MEAL. 90 capsule 2     ranitidine (ZANTAC) 150 MG/10ML syrup Take 10 mLs (150 mg) by mouth 2 times daily 600 mL 10     sucralfate (CARAFATE) 1 GM tablet Take 1 tablet (1 g) by mouth 4 times daily 30 tablet 0     triamcinolone (KENALOG) 0.1 % ointment Apply  sparingly to affected area three times daily for 14 days. 15 g 1     No facility-administered encounter medications on file as of 7/17/2019.        Review Of Systems:  Skin: As above  Eyes: negative  Ears/Nose/Throat: negative  Respiratory: No shortness of breath, dyspnea on exertion, cough, or hemoptysis  Cardiovascular: negative  Gastrointestinal: negative  Genitourinary: negative  Musculoskeletal: negative  Neurologic: negative  Psychiatric: negative  Hematologic/Lymphatic/Immunologic: negative  Endocrine: negative      Objective:     /77   Pulse 79   SpO2 98%   Eyes: Conjunctivae/lids: Normal   ENT: Lips:  Normal  MSK: Normal  Cardiovascular: Peripheral edema none  Pulm: Breathing Normal  Neuro/Psych: Orientation: Normal; Mood/Affect: Normal, NAD, WDWN  Pt accompanied by: self  Following areas examined: face, neck, hands  Mae skin type:ii   Findings:  Pink scaly patches on inferior eyelids  Pink scaly macules on upper cutaneous lip and nose  Rhytides, hypo/hyperpigmentation, and atrophy  Faint pink stuck on appearing papule on right temple  Assessment and Plan:  1) Actinic keratoses x 7 and solar elastosis    LN2 for 5 seconds x 2. Discussed AE include hypopigmentation (white spot) and recurrence. Follow up in 2-3 months to recheck lesions. There is a 0.025%-20% chance that AKs can develop into a SCC.   Treatment options include LN2 vs PDT vs Efudex. Pt elected LN2    2) eyelid dermatitis  Apply a thin layer of Triamcinolone to affected area 2x a day for 1-2 weeks. Tapering with improvement. Discussed side effects of topical steroids including but not limited to atrophy (skin thinning), striae (stretch marks) telangiectasias, steroid acne, and others. Do not apply to normal skin. Do not apply to discolored skin that does not have rash present. Educated patient on post inflammatory hyperpigmentation.   moisturize eye area daily  Consider vaseline or aquaphor at night.     3) Keratosis doubt SCC on  right temple  Present x a few days?  Watch and monitor f/u in one month if not resolved or worsening.     Debby to follow up with Primary Care provider regarding elevated blood pressure.      Follow up in yearly FBE. 2-3 months to recheck aks

## 2019-07-17 NOTE — LETTER
7/17/2019         RE: Debby Chan  [address]  [facility] [address]        Dear Colleague,    Thank you for referring your patient, Debby Chan, to the [facility]. Please see a copy of my visit note below.    HPI:  Debby Chan is a 69 year old female patient here today for follow up aks on upper cutaneous lip.  Patient states this has been present for a while.  Patient reports the following symptoms: improvement .Patient reports the following previous treatments: ln2.  Patient reports the following modifying factors: none.  Associated symptoms: none.  Also has a rash on inferior eyelids for a while. Itchy and burns. Noticed a new spot on right temple area for a few days?. Not bothersome. No tx tried. Patient has no other skin complaints today.  Remainder of the HPI, Meds, PMH, Allergies, FH, and SH was reviewed in chart.    Pertinent Hx:   Sister had melanoma. Father skin cancer unsure of type.   Past Medical History:   Diagnosis Date     Acid reflux disease      Pure hypercholesterolemia        Past Surgical History:   Procedure Laterality Date     BREAST BIOPSY, RT/LT Right 1985    benign fibroadenoma     ESOPHAGOSCOPY, GASTROSCOPY, DUODENOSCOPY (EGD), COMBINED N/A 12/21/2017    Procedure: COMBINED ESOPHAGOSCOPY, GASTROSCOPY, DUODENOSCOPY (EGD), BIOPSY SINGLE OR MULTIPLE;  gastroscopy;  Surgeon: Srinivasa Jackson MD;  Location:  GI     TUBAL LIGATION  1990        Family History   Problem Relation Age of Onset     Myocardial Infarction Father         later in life; s/p PCI     Prostate Cancer Father      Diabetes Type 2  Father      Skin Cancer Father      Heart Failure Mother      Uterine Cancer Paternal Aunt      Melanoma Sister      Cerebrovascular Disease No family hx of      Coronary Artery Disease Early Onset No family hx of      Ovarian Cancer No family hx of      Colon Cancer No family hx of        Social History     Socioeconomic  History     Marital status:      Spouse name: Not on file     Number of children: Not on file     Years of education: Not on file     Highest education level: Not on file   Occupational History     Occupation: Part-time at [employer]     Occupation: Retired from [employer]   Social Needs     Financial resource strain: Not on file     Food insecurity:     Worry: Not on file     Inability: Not on file     Transportation needs:     Medical: Not on file     Non-medical: Not on file   Tobacco Use     Smoking status: Never Smoker     Smokeless tobacco: Never Used   Substance and Sexual Activity     Alcohol use: Yes     Comment: ~3 glasses of wine/week     Drug use: No     Sexual activity: Not Currently     Partners: Male   Lifestyle     Physical activity:     Days per week: Not on file     Minutes per session: Not on file     Stress: Not on file   Relationships     Social connections:     Talks on phone: Not on file     Gets together: Not on file     Attends Caodaism service: Not on file     Active member of club or organization: Not on file     Attends meetings of clubs or organizations: Not on file     Relationship status: Not on file     Intimate partner violence:     Fear of current or ex partner: Not on file     Emotionally abused: Not on file     Physically abused: Not on file     Forced sexual activity: Not on file   Other Topics Concern     Parent/sibling w/ CABG, MI or angioplasty before 65F 55M? No   Social History Narrative    .    No kids.    No formal exercise, but stays active.        Outpatient Encounter Medications as of 7/17/2019   Medication Sig Dispense Refill     atorvastatin (LIPITOR) 20 MG tablet TAKE 1 TABLET BY MOUTH  DAILY 90 tablet 2     Calcium Carbonate-Vitamin D (CALCIUM + D PO) Take  by mouth. Plus magnesium       Cholecalciferol (VITAMIN D) 2000 UNITS tablet Take 1 tablet by mouth daily.       omeprazole (PRILOSEC) 40 MG DR capsule TAKE 1 CAPSULE BY MOUTH  DAILY TAKE 30-60 MINUTES   BEFORE A MEAL. 90 capsule 2     ranitidine (ZANTAC) 150 MG/10ML syrup Take 10 mLs (150 mg) by mouth 2 times daily 600 mL 10     sucralfate (CARAFATE) 1 GM tablet Take 1 tablet (1 g) by mouth 4 times daily 30 tablet 0     triamcinolone (KENALOG) 0.1 % ointment Apply sparingly to affected area three times daily for 14 days. 15 g 1     No facility-administered encounter medications on file as of 7/17/2019.        Review Of Systems:  Skin: As above  Eyes: negative  Ears/Nose/Throat: negative  Respiratory: No shortness of breath, dyspnea on exertion, cough, or hemoptysis  Cardiovascular: negative  Gastrointestinal: negative  Genitourinary: negative  Musculoskeletal: negative  Neurologic: negative  Psychiatric: negative  Hematologic/Lymphatic/Immunologic: negative  Endocrine: negative      Objective:     /77   Pulse 79   SpO2 98%   Eyes: Conjunctivae/lids: Normal   ENT: Lips:  Normal  MSK: Normal  Cardiovascular: Peripheral edema none  Pulm: Breathing Normal  Neuro/Psych: Orientation: Normal; Mood/Affect: Normal, NAD, WDWN  Pt accompanied by: self  Following areas examined: face, neck, hands  Mae skin type:ii   Findings:  Pink scaly patches on inferior eyelids  Pink scaly macules on upper cutaneous lip and nose  Rhytides, hypo/hyperpigmentation, and atrophy  Faint pink stuck on appearing papule on right temple  Assessment and Plan:  1) Actinic keratoses x 7 and solar elastosis    LN2 for 5 seconds x 2. Discussed AE include hypopigmentation (white spot) and recurrence. Follow up in 2-3 months to recheck lesions. There is a 0.025%-20% chance that AKs can develop into a SCC.   Treatment options include LN2 vs PDT vs Efudex. Pt elected LN2    2) eyelid dermatitis  Apply a thin layer of Triamcinolone to affected area 2x a day for 1-2 weeks. Tapering with improvement. Discussed side effects of topical steroids including but not limited to atrophy (skin thinning), striae (stretch marks) telangiectasias, steroid  acne, and others. Do not apply to normal skin. Do not apply to discolored skin that does not have rash present. Educated patient on post inflammatory hyperpigmentation.   moisturize eye area daily  Consider vaseline or aquaphor at night.     3) Keratosis doubt SCC on right temple  Present x a few days?  Watch and monitor in one month if not resolved or worsening.     Follow up in yearly FBE. 2-3 months to recheck aks      Again, thank you for allowing me to participate in the care of your patient.        Sincerely,        Ama Kapoor PA-C

## 2019-07-17 NOTE — PATIENT INSTRUCTIONS
WOUND CARE INSTRUCTIONS  FOR CRYOSURGERY        This area treated with liquid nitrogen will form a blister. You do not need to bandage the area until after the blister forms and breaks (which may be a few days).  When the blister breaks, begin daily dressing changes as follows:    1) Clean and dry the area with tap water using clean Q-tip or sterile gauze pad.    2) Apply Polysporin ointment or Bacitracin ointment over entire wound.  Do NOT use Neosporin ointment.    3) Cover the wound with a band-aid or sterile non-stick gauze pad and micropore paper tape.      REPEAT THESE INSTRUCTIONS AT LEAST ONCE A DAY UNTIL THE WOUND HAS COMPLETELY HEALED.        It is an old wives tale that a wound heals better when it is exposed to air and allowed to dry out. The wound will heal faster with a better cosmetic result if it is kept moist with ointment and covered with a bandage.  Do not let the wound dry out.      Supplies Needed:     *Cotton tipped applicators (Q-tips)   *Polysporin ointment or Bacitracin ointment (NOT NEOSPORIN)   *Band-aids, or non stick gauze pads and micropore paper tape    PATIENT INFORMATION    During the healing process you will notice a number of changes. All wounds develop a small halo of redness surrounding the wound.  This means healing is occurring. Severe itching with extensive redness usually indicates sensitivity to the ointment or bandage tape used to dress the wound.  You should call our office if this develops.      Swelling and/or discoloration around your surgical site is common, particularly when performed around the eye.    All wounds normally drain.  The larger the wound the more drainage there will be.  After 7-10 days, you will notice the wound beginning to shrink and new skin will begin to grow.  The wound is healed when you can see skin has formed over the entire area.  A healed wound has a healthy, shiny look to the surface and is red to dark pink in color to normalize.  Wounds may  take approximately 4-6 weeks to heal.  Larger wounds may take 6-8 weeks.  After the wound is healed you may discontinue dressing changes.    You may experience a sensation of tightness as your wound heals. This is normal and will gradually subside.    Your healed wound may be sensitive to temperature changes. This sensitivity improves with time, but if you re having a lot of discomfort, try to avoid temperature extremes.    Patients frequently experience itching after their wound appears to have healed because of the continue healing under the skin.  Plain Vaseline will help relieve the itching.         Proper skin care from [facility]:    -Eliminate harsh soaps as they strip the natural oils from the skin, often resulting in dry itchy skin ( i.e. Dial, Zest, Citizen of [place] Spring)  -Use mild soaps such as Cetaphil or Dove Sensitive Skin in the shower. You do not need to use soap on arms, legs, and trunk every time you shower unless visibly soiled.   -Avoid hot or cold showers.  -After showering, lightly dry off and apply moisturizing within 2-3 minutes. This will help trap moisture in the skin.   -Aggressive use of a moisturizer at least 1-2 times a day to the entire body (including -Vanicream, Cetaphil, Aquaphor or Cerave) and moisturize hands after every washing.  -We recommend using moisturizers that come in a tub that needs to be scooped out, not a pump. This has more of an oil base. It will hold moisture in your skin much better than a water base moisturizer. The above recommended are non-pore clogging.      Wear a sunscreen with at least SPF 30 on your face, ears, neck and V of the chest daily. Wear sunscreen on other areas of the body if those areas are exposed to the sun throughout the day. Sunscreens can contain physical and/or chemical blockers. Physical blockers are less likely to clog pores, these include zinc oxide and titanium dioxide. Reapply every two hour and after swimming. Sunscreen examples  include Neutrogena, CeraVe, Blue Lizard, Elta MD and many others.    UV radiation  UVA radiation remains constant throughout the day and throughout the year. It is a longer wavelength than UVB and therefore penetrates deeper into the skin leading to immediate and delayed tanning, photoaging, and skin cancer. 70-80% of UVA and UVB radiation occurs between the hours of 10am-2pm.  UVB radiation  UVB radiation causes the most harmful effects and is more significant during the summer months. However, snow and ice can reflect UVB radiation leading to skin damage during the winter months as well. UVB radiation is responsible for tanning, burning, inflammation, delayed erythema (pinkness), pigmentation (brown spots), and skin cancer.       eyelid dermatitis  Apply a thin layer of Triamcinolone to affected area 2x a day for 1-2 weeks. Tapering with improvement. Discussed side effects of topical steroids including but not limited to atrophy (skin thinning), striae (stretch marks) telangiectasias, steroid acne, and others. Do not apply to normal skin. Do not apply to discolored skin that does not have rash present. Educated patient on post inflammatory hyperpigmentation.   moisturize eye area daily  Consider vaseline or aquaphor at night.   If rash isnt improving follow up sooner

## 2019-08-22 ENCOUNTER — TELEPHONE (OUTPATIENT)
Dept: DERMATOLOGY | Facility: CLINIC | Age: 69
End: 2019-08-22

## 2019-08-22 NOTE — TELEPHONE ENCOUNTER
Patient would like a letter so she is able to have tinted windows darker then the number 50    Last OV 7/17/19:   Dermatitis      Actinic keratosis      Seborrheic keratosis      Solar elastosis

## 2019-08-22 NOTE — LETTER
August 22, 2019    Debby HAGER Pao Chan  [address] [facility] [id number]  [employer]      Debby HAGER Pao Chan saw me on 07/17/2019 and due to a dermatologic skin condition needs aVLT (visible light transmission) of less than 50%.           Sincerely,       Ama Kapoor PA-C

## 2019-08-23 NOTE — TELEPHONE ENCOUNTER
Left message on voice mail with providers message. We will not provider a letter past the legal limit- 50% is adequate UV protection and helps prevent skin cancer  We can write a letter that states for medical reasons her  should be tinted- just not below the 50%  (the lower the tint number, the darker the tint -100% is no tint)  Heidy AvalosRN BSN  [facility]  [phone]

## 2019-08-23 NOTE — TELEPHONE ENCOUNTER
That's a perfect letter. Thank you so much. Please let pt know that I cannot recommend over the legal limit for window tint.     Thank you,    Jazmine

## 2019-09-02 DIAGNOSIS — K21.9 GASTROESOPHAGEAL REFLUX DISEASE, ESOPHAGITIS PRESENCE NOT SPECIFIED: ICD-10-CM

## 2019-09-02 DIAGNOSIS — E78.00 PURE HYPERCHOLESTEROLEMIA: ICD-10-CM

## 2019-09-03 RX ORDER — OMEPRAZOLE 40 MG/1
CAPSULE, DELAYED RELEASE ORAL
Qty: 90 CAPSULE | Refills: 2 | Status: SHIPPED | OUTPATIENT
Start: 2019-09-03 | End: 2020-07-24

## 2019-09-03 RX ORDER — ATORVASTATIN CALCIUM 20 MG/1
TABLET, FILM COATED ORAL
Qty: 90 TABLET | Refills: 0 | Status: SHIPPED | OUTPATIENT
Start: 2019-09-03 | End: 2019-10-28

## 2019-09-03 NOTE — TELEPHONE ENCOUNTER
"Requested Prescriptions   Pending Prescriptions Disp Refills     atorvastatin (LIPITOR) 20 MG tablet [Pharmacy Med Name: ATORVASTATIN  20MG  TAB]  Last Written Prescription Date:  01/04/2019  Last Fill Quantity: 90,  # refills: 2   Last office visit: 5/23/2019 with prescribing provider:  MONICA  Future Office Visit:     90 tablet 2     Sig: TAKE 1 TABLET BY MOUTH  DAILY       Statins Protocol Passed - 9/2/2019  8:04 PM        Passed - LDL on file in past 12 months     Recent Labs   Lab Test 10/17/18  0833   LDL 79             Passed - No abnormal creatine kinase in past 12 months     No lab results found.             Passed - Recent (12 mo) or future (30 days) visit within the authorizing provider's specialty     Patient had office visit in the last 12 months or has a visit in the next 30 days with authorizing provider or within the authorizing provider's specialty.  See \"Patient Info\" tab in inbasket, or \"Choose Columns\" in Meds & Orders section of the refill encounter.              Passed - Medication is active on med list        Passed - Patient is age 18 or older        Passed - No active pregnancy on record        Passed - No positive pregnancy test in past 12 months        omeprazole (PRILOSEC) 40 MG DR capsule [Pharmacy Med Name: OMEPRAZOLE  40MG  CAP]  Last Written Prescription Date:  01/04/2019  Last Fill Quantity: 90,  # refills: 2   Last office visit: 5/23/2019 with prescribing provider:  MONICA   Future Office Visit:     90 capsule 2     Sig: TAKE 1 CAPSULE BY MOUTH  DAILY 30-60 MINUTES BEFORE  A MEAL       PPI Protocol Passed - 9/2/2019  8:04 PM        Passed - Not on Clopidogrel (unless Pantoprazole ordered)        Passed - No diagnosis of osteoporosis on record        Passed - Recent (12 mo) or future (30 days) visit within the authorizing provider's specialty     Patient had office visit in the last 12 months or has a visit in the next 30 days with authorizing provider or within the authorizing " "provider's specialty.  See \"Patient Info\" tab in inbasket, or \"Choose Columns\" in Meds & Orders section of the refill encounter.              Passed - Medication is active on med list        Passed - Patient is age 18 or older        Passed - No active pregnacy on record        Passed - No positive pregnancy test in past 12 months          "

## 2019-10-28 DIAGNOSIS — E78.00 PURE HYPERCHOLESTEROLEMIA: ICD-10-CM

## 2019-10-29 RX ORDER — ATORVASTATIN CALCIUM 20 MG/1
TABLET, FILM COATED ORAL
Qty: 90 TABLET | Refills: 0 | Status: SHIPPED | OUTPATIENT
Start: 2019-10-29 | End: 2020-03-24

## 2019-10-29 NOTE — TELEPHONE ENCOUNTER
"Requested Prescriptions   Pending Prescriptions Disp Refills     atorvastatin (LIPITOR) 20 MG tablet [Pharmacy Med Name: ATORVASTATIN  20MG  TAB]  Last Written Prescription Date:  09/03/2019  Last Fill Quantity: 90,  # refills: 0   Last Office Visit: 5/23/2019   Future Office Visit:    Next 5 appointments (look out 90 days)    Nov 11, 2019  9:00 AM CST  [facility] [address] [place] [id number] [phone]          90 tablet 0     Sig: TAKE 1 TABLET BY MOUTH  DAILY       Statins Protocol Failed - 10/28/2019  8:04 PM        Failed - LDL on file in past 12 months     Recent Labs   Lab Test 10/17/18  0833   LDL 79             Passed - No abnormal creatine kinase in past 12 months     No lab results found.             Passed - Recent (12 mo) or future (30 days) visit within the authorizing provider's specialty     Patient has had an office visit with the authorizing provider or a provider within the authorizing providers department within the previous 12 mos or has a future within next 30 days. See \"Patient Info\" tab in inbasket, or \"Choose Columns\" in Meds & Orders section of the refill encounter.              Passed - Medication is active on med list        Passed - Patient is age 18 or older        Passed - No active pregnancy on record        Passed - No positive pregnancy test in past 12 months          "

## 2019-11-03 ENCOUNTER — DOCUMENTATION ONLY (OUTPATIENT)
Dept: INTERNAL MEDICINE | Facility: CLINIC | Age: 69
End: 2019-11-03

## 2019-11-03 DIAGNOSIS — Z13.0 SCREENING FOR BLOOD DISEASE: ICD-10-CM

## 2019-11-03 DIAGNOSIS — E55.9 VITAMIN D DEFICIENCY: ICD-10-CM

## 2019-11-03 DIAGNOSIS — E78.00 PURE HYPERCHOLESTEROLEMIA: Primary | ICD-10-CM

## 2019-11-03 DIAGNOSIS — Z13.1 SCREENING FOR DIABETES MELLITUS: ICD-10-CM

## 2019-11-05 ENCOUNTER — OFFICE VISIT (OUTPATIENT)
Dept: INTERNAL MEDICINE | Facility: CLINIC | Age: 69
End: 2019-11-05
Payer: COMMERCIAL

## 2019-11-05 VITALS
BODY MASS INDEX: 25.96 KG/M2 | HEART RATE: 77 BPM | WEIGHT: 153.6 LBS | SYSTOLIC BLOOD PRESSURE: 120 MMHG | OXYGEN SATURATION: 98 % | RESPIRATION RATE: 16 BRPM | DIASTOLIC BLOOD PRESSURE: 84 MMHG

## 2019-11-05 DIAGNOSIS — Z13.0 SCREENING FOR BLOOD DISEASE: ICD-10-CM

## 2019-11-05 DIAGNOSIS — K21.9 GASTROESOPHAGEAL REFLUX DISEASE, ESOPHAGITIS PRESENCE NOT SPECIFIED: ICD-10-CM

## 2019-11-05 DIAGNOSIS — E55.9 VITAMIN D DEFICIENCY: ICD-10-CM

## 2019-11-05 DIAGNOSIS — R07.2 SUBSTERNAL CHEST PAIN: Primary | ICD-10-CM

## 2019-11-05 DIAGNOSIS — E78.00 PURE HYPERCHOLESTEROLEMIA: ICD-10-CM

## 2019-11-05 DIAGNOSIS — Z79.1 NSAID LONG-TERM USE: ICD-10-CM

## 2019-11-05 DIAGNOSIS — Z13.1 SCREENING FOR DIABETES MELLITUS: ICD-10-CM

## 2019-11-05 DIAGNOSIS — G47.9 DIFFICULTY SLEEPING: ICD-10-CM

## 2019-11-05 LAB
ALBUMIN SERPL-MCNC: 4.2 G/DL (ref 3.4–5)
ALP SERPL-CCNC: 93 U/L (ref 40–150)
ALT SERPL W P-5'-P-CCNC: 34 U/L (ref 0–50)
ANION GAP SERPL CALCULATED.3IONS-SCNC: 8 MMOL/L (ref 3–14)
AST SERPL W P-5'-P-CCNC: 15 U/L (ref 0–45)
BILIRUB SERPL-MCNC: 0.6 MG/DL (ref 0.2–1.3)
BUN SERPL-MCNC: 16 MG/DL (ref 7–30)
CALCIUM SERPL-MCNC: 9.3 MG/DL (ref 8.5–10.1)
CHLORIDE SERPL-SCNC: 108 MMOL/L (ref 94–109)
CHOLEST SERPL-MCNC: 156 MG/DL
CO2 SERPL-SCNC: 25 MMOL/L (ref 20–32)
CREAT SERPL-MCNC: 0.89 MG/DL (ref 0.52–1.04)
ERYTHROCYTE [DISTWIDTH] IN BLOOD BY AUTOMATED COUNT: 13.6 % (ref 10–15)
GFR SERPL CREATININE-BSD FRML MDRD: 66 ML/MIN/{1.73_M2}
GLUCOSE SERPL-MCNC: 105 MG/DL (ref 70–99)
HCT VFR BLD AUTO: 45.8 % (ref 35–47)
HDLC SERPL-MCNC: 57 MG/DL
HGB BLD-MCNC: 15.3 G/DL (ref 11.7–15.7)
LDLC SERPL CALC-MCNC: 75 MG/DL
MCH RBC QN AUTO: 30.7 PG (ref 26.5–33)
MCHC RBC AUTO-ENTMCNC: 33.4 G/DL (ref 31.5–36.5)
MCV RBC AUTO: 92 FL (ref 78–100)
NONHDLC SERPL-MCNC: 99 MG/DL
PLATELET # BLD AUTO: 243 10E9/L (ref 150–450)
POTASSIUM SERPL-SCNC: 3.9 MMOL/L (ref 3.4–5.3)
PROT SERPL-MCNC: 7.2 G/DL (ref 6.8–8.8)
RBC # BLD AUTO: 4.99 10E12/L (ref 3.8–5.2)
SODIUM SERPL-SCNC: 141 MMOL/L (ref 133–144)
TRIGL SERPL-MCNC: 119 MG/DL
WBC # BLD AUTO: 5.5 10E9/L (ref 4–11)

## 2019-11-05 PROCEDURE — 93000 ELECTROCARDIOGRAM COMPLETE: CPT | Performed by: INTERNAL MEDICINE

## 2019-11-05 PROCEDURE — 36415 COLL VENOUS BLD VENIPUNCTURE: CPT | Performed by: INTERNAL MEDICINE

## 2019-11-05 PROCEDURE — 80061 LIPID PANEL: CPT | Performed by: INTERNAL MEDICINE

## 2019-11-05 PROCEDURE — 82306 VITAMIN D 25 HYDROXY: CPT | Performed by: INTERNAL MEDICINE

## 2019-11-05 PROCEDURE — 85027 COMPLETE CBC AUTOMATED: CPT | Performed by: INTERNAL MEDICINE

## 2019-11-05 PROCEDURE — 99214 OFFICE O/P EST MOD 30 MIN: CPT | Performed by: INTERNAL MEDICINE

## 2019-11-05 PROCEDURE — 80053 COMPREHEN METABOLIC PANEL: CPT | Performed by: INTERNAL MEDICINE

## 2019-11-05 RX ORDER — TRAZODONE HYDROCHLORIDE 50 MG/1
50 TABLET, FILM COATED ORAL AT BEDTIME
Qty: 90 TABLET | Refills: 0 | Status: SHIPPED | OUTPATIENT
Start: 2019-11-05 | End: 2019-11-11

## 2019-11-05 RX ORDER — SUCRALFATE 1 G/1
1 TABLET ORAL 4 TIMES DAILY
Qty: 30 TABLET | Refills: 1 | Status: SHIPPED | OUTPATIENT
Start: 2019-11-05 | End: 2020-11-23

## 2019-11-05 NOTE — PATIENT INSTRUCTIONS
EKG today.    ---    CONTINUE omeprazole 40mg daily.    ---    CONTINUE Zantac twice a day consistently (ask pharmacy if solution was recalled).    ---    Carafate 4x/day as needed for pain relief.     ---    STOP Excedrin PM.    ---    START Trazodone for sleep. Start with a half tab nightly. Can increase to 1-2 tabs as needed.

## 2019-11-05 NOTE — PROGRESS NOTES
SUBJECTIVE:                                                      HPI: Debby Chan is a pleasant 69 year old female who presents with chest pain:    - ongoing since last Tuesday  - substernal in location  - burning in quality  - worse after eating    - no change with exertion; no relief with rest  - no nausea or vomiting  - no diaphoresis  - no shortness of breath    PMH significant for acid reflux on daily omeprazole 40 mg.  Has also been taking Zantac solution twice a day for the last week since chest pain started.  No significant improvement.    Medications significant for Excedrin P.M. nightly for difficulty sleeping.  Patient says she has tried to stop taking this but has difficulty sleeping if she doesn't (so has been taking nightly long-term).    The medication, allergy, and problem lists have been reviewed and updated as appropriate.     OBJECTIVE:                                                      /84   Pulse 77   Resp 16   Wt 69.7 kg (153 lb 9.6 oz)   SpO2 98%   BMI 25.96 kg/m    Constitutional: well-appearing  Respiratory: normal respiratory effort; clear to auscultation bilaterally  Cardiovascular: regular rate and rhythm; no edema  Gastrointestinal: soft, epigastric tenderness to palpation, non-distended, and bowel sounds present; no organomegaly or masses   Musculoskeletal: normal gait and station  Psych: normal judgment and insight; normal mood and affect; recent and remote memory intact    ASSESSMENT/PLAN:                                                      (R07.2) Substernal chest pain  (primary encounter diagnosis)  (K21.9) Gastroesophageal reflux disease, esophagitis presence not specified    (Z79.1) NSAID long-term use  (G47.9) Difficulty sleeping  Comment: suspect gastritis due to nightly Excedrin P.M. use.  Plan:    - EKG today to evaluate for ACS.   - CONTINUE omeprazole 40 mg daily and Zantac twice daily.   - START Carafate 4 times a day as needed.   - STOP  Excedrin P.M.; TRIAL of trazodone for sleep.    The instructions on the AVS were discussed and explained to the patient. Patient expressed understanding of instructions.    (Chart documentation was completed, in part, with [employer] voice-recognition software. Even though reviewed, some grammatical, spelling, and word errors may remain.)    Ida Lyles MD   [address] [address]  T: [phone], F: [phone]

## 2019-11-06 LAB — DEPRECATED CALCIDIOL+CALCIFEROL SERPL-MC: 40 UG/L (ref 20–75)

## 2019-11-11 ENCOUNTER — OFFICE VISIT (OUTPATIENT)
Dept: INTERNAL MEDICINE | Facility: CLINIC | Age: 69
End: 2019-11-11
Payer: COMMERCIAL

## 2019-11-11 VITALS
BODY MASS INDEX: 25.79 KG/M2 | SYSTOLIC BLOOD PRESSURE: 126 MMHG | OXYGEN SATURATION: 98 % | RESPIRATION RATE: 16 BRPM | HEART RATE: 75 BPM | DIASTOLIC BLOOD PRESSURE: 74 MMHG | WEIGHT: 154.8 LBS | HEIGHT: 65 IN

## 2019-11-11 DIAGNOSIS — Z00.00 ROUTINE HISTORY AND PHYSICAL EXAMINATION OF ADULT: Primary | ICD-10-CM

## 2019-11-11 DIAGNOSIS — R21 RASH: ICD-10-CM

## 2019-11-11 PROCEDURE — 99397 PER PM REEVAL EST PAT 65+ YR: CPT | Performed by: INTERNAL MEDICINE

## 2019-11-11 RX ORDER — TRIAMCINOLONE ACETONIDE 1 MG/G
OINTMENT TOPICAL
Qty: 30 G | Refills: 0 | Status: SHIPPED | OUTPATIENT
Start: 2019-11-11 | End: 2020-08-13

## 2019-11-11 SDOH — HEALTH STABILITY: MENTAL HEALTH: HOW OFTEN DO YOU HAVE 6 OR MORE DRINKS ON ONE OCCASION?: NEVER

## 2019-11-11 SDOH — HEALTH STABILITY: MENTAL HEALTH: HOW OFTEN DO YOU HAVE A DRINK CONTAINING ALCOHOL?: 2-3 TIMES A WEEK

## 2019-11-11 SDOH — HEALTH STABILITY: MENTAL HEALTH: HOW MANY STANDARD DRINKS CONTAINING ALCOHOL DO YOU HAVE ON A TYPICAL DAY?: 1 OR 2

## 2019-11-11 ASSESSMENT — ACTIVITIES OF DAILY LIVING (ADL): CURRENT_FUNCTION: NO ASSISTANCE NEEDED

## 2019-11-11 ASSESSMENT — MIFFLIN-ST. JEOR: SCORE: 1224.08

## 2019-11-11 NOTE — PROGRESS NOTES
SUBJECTIVE                                                      HPI: Debby Chan is a pleasant 69 year old female who presents for a physical.    No specific complaints, concerns, or questions.    ROS:  Constitutional: denies unintentional weight loss or gain; denies fevers, chills, or sweats     Cardiovascular: see PMH below; denies chest pain, palpitations, or edema  Respiratory: denies cough, wheezing, shortness of breath, or dyspnea on exertion  Gastrointestinal: see PMH below; denies nausea, vomiting, constipation, diarrhea, or abdominal pain  Genitourinary: denies urinary frequency, urgency, dysuria, or hematuria  Integumentary: denies rash or pruritus  Musculoskeletal: denies back pain, muscle pain, joint pain, or joint swelling  Neurologic: denies focal weakness, numbness, or tingling  Hematologic/Immunologic: denies history of anemia or blood transfusions  Endocrine: denies heat or cold intolerance; see PMH below; denies polyuria, polydipsia  Psychiatric: denies anxiety; see preventative health below    Past Medical History:   Diagnosis Date     Acid reflux disease      Impaired fasting glucose      Pure hypercholesterolemia      Past Surgical History:   Procedure Laterality Date     BREAST BIOPSY, RT/LT Right 1985    benign fibroadenoma     ESOPHAGOSCOPY, GASTROSCOPY, DUODENOSCOPY (EGD), COMBINED N/A 12/21/2017    Procedure: COMBINED ESOPHAGOSCOPY, GASTROSCOPY, DUODENOSCOPY (EGD), BIOPSY SINGLE OR MULTIPLE;  gastroscopy;  Surgeon: Srinivasa Jackson MD;  Location:  GI     TUBAL LIGATION  1990     Family History   Problem Relation Age of Onset     Myocardial Infarction Father         later in life; s/p PCI     Prostate Cancer Father      Diabetes Type 2  Father      Skin Cancer Father      Heart Failure Mother      Uterine Cancer Paternal Aunt      Melanoma Sister      Cerebrovascular Disease No family hx of      Coronary Artery Disease Early Onset No family hx of      Ovarian Cancer No family  "hx of      Colon Cancer No family hx of      Social History     Occupational History     Occupation: Part-time at [employer]     Occupation: Retired from [employer]   Tobacco Use     Smoking status: Never Smoker     Smokeless tobacco: Never Used   Substance and Sexual Activity     Alcohol use: Yes     Frequency: 2-3 times a week     Drinks per session: 1 or 2     Binge frequency: Never     Drug use: No     Sexual activity: Not Currently     Partners: Male   Social History Narrative    .    No kids.    No formal exercise, but stays active.      Allergies   Allergen Reactions     Penicillins Nausea     Current Outpatient Medications   Medication Sig     atorvastatin (LIPITOR) 20 MG tablet TAKE 1 TABLET BY MOUTH  DAILY     Calcium Carbonate-Vitamin D (CALCIUM + D PO) Take  by mouth. Plus magnesium     Cholecalciferol (VITAMIN D) 2000 UNITS tablet Take 1 tablet by mouth daily.     omeprazole (PRILOSEC) 40 MG DR capsule TAKE 1 CAPSULE BY MOUTH  DAILY 30-60 MINUTES BEFORE  A MEAL     ranitidine (ZANTAC) 150 MG/10ML syrup Take 10 mLs (150 mg) by mouth 2 times daily     sucralfate (CARAFATE) 1 GM tablet Take 1 tablet (1 g) by mouth 4 times daily     triamcinolone (KENALOG) 0.025 % cream Apply topically 2 times daily A thin layer to eyelids x 1-2 weeks.     triamcinolone (KENALOG) 0.1 % external ointment Apply sparingly to affected area three times daily for 14 days.     Immunization History   Administered Date(s) Administered     Influenza (High Dose) 3 valent vaccine 10/04/2016, 10/17/2017, 10/23/2018     Influenza (IIV3) PF 12/03/2010     Pneumo Conj 13-V (2010&after) 08/07/2015     Pneumococcal 23 valent 10/04/2016     TDAP Vaccine (Adacel) 05/31/2012     OBJECTIVE                                                      /74   Pulse 75   Resp 16   Ht 1.645 m (5' 4.75\")   Wt 70.2 kg (154 lb 12.8 oz)   SpO2 98%   BMI 25.96 kg/m    Constitutional: well-appearing  Eyes: normal conjunctivae and lids; pupils equal, " round, and reactive to light  Ears, Nose, Mouth, and Throat: normal ears and nose; tympanic membranes visualized and normal; normal lips, teeth, and gums; no oropharyngeal lesions or ulcers  Neck: supple and symmetric; no lymphadenopathy; no thyromegaly or masses  Respiratory: normal respiratory effort; clear to auscultation bilaterally  Cardiovascular: regular rate and rhythm; pedal pulses palpable; no edema  Gastrointestinal: soft, non-tender, non-distended, and bowel sounds present; no organomegaly or masses  Musculoskeletal: normal gait and station  Psych: normal judgment and insight; normal mood and affect; recent and remote memory intact; oriented to time, place, and person    PREVENTATIVE HEALTH                                                      BMI: within normal limits (for age)  Blood pressure: within normal limits   Breast CA screening: DUE - scheduled (outside imaging center)  Cervical CA screening: screening no longer indicated  Colon CA screening: up to date   Lung CA screening: n/a   Dexa: up to date   Screening HCV: completed  Screening HIV: n/a   Screening diabetes: up to date   STD testing: no risk factors present  Depression screening: PHQ-2 assessment completed and reviewed - no intervention indicated at this time  Alcohol misuse screening: alcohol use reviewed - no intervention indicated at this time  Immunizations: reviewed; patient will obtain high-dose flu shot in pharmacy; Shingrix series DUE - patient will consider    ASSESSMENT/PLAN                                                       (Z00.00) Routine history and physical examination of adult  (primary encounter diagnosis)  Comment: PMH, PSH, FH, SH, medications, allergies, immunizations, and preventative health measures reviewed.   Plan: no additional preventive health interventions indicated at this time (other than scheduled mammogram and vaccines).    (R21) Rash  Comment: well controlled with triamcinolone as needed.  Plan: CPM;  refills provided.    The instructions on the AVS were discussed and explained to the patient. Patient expressed understanding of instructions.    (Chart documentation was completed, in part, with [employer] voice-recognition software. Even though reviewed, some grammatical, spelling, and word errors may remain.)    Ida Lyles MD   [address] [address]  T: [phone], F: [phone]

## 2019-11-12 ENCOUNTER — TRANSFERRED RECORDS (OUTPATIENT)
Dept: HEALTH INFORMATION MANAGEMENT | Facility: CLINIC | Age: 69
End: 2019-11-12

## 2020-02-19 ENCOUNTER — OFFICE VISIT (OUTPATIENT)
Dept: INTERNAL MEDICINE | Facility: CLINIC | Age: 70
End: 2020-02-19
Payer: COMMERCIAL

## 2020-02-19 ENCOUNTER — ANCILLARY PROCEDURE (OUTPATIENT)
Dept: GENERAL RADIOLOGY | Facility: CLINIC | Age: 70
End: 2020-02-19
Attending: PHYSICIAN ASSISTANT
Payer: COMMERCIAL

## 2020-02-19 VITALS
OXYGEN SATURATION: 99 % | WEIGHT: 151.5 LBS | DIASTOLIC BLOOD PRESSURE: 64 MMHG | HEART RATE: 84 BPM | BODY MASS INDEX: 25.24 KG/M2 | RESPIRATION RATE: 18 BRPM | HEIGHT: 65 IN | TEMPERATURE: 98.2 F | SYSTOLIC BLOOD PRESSURE: 118 MMHG

## 2020-02-19 DIAGNOSIS — K21.9 GASTROESOPHAGEAL REFLUX DISEASE, ESOPHAGITIS PRESENCE NOT SPECIFIED: ICD-10-CM

## 2020-02-19 DIAGNOSIS — R07.89 ATYPICAL CHEST PAIN: Primary | ICD-10-CM

## 2020-02-19 DIAGNOSIS — R53.83 FATIGUE, UNSPECIFIED TYPE: ICD-10-CM

## 2020-02-19 DIAGNOSIS — R94.39 ABNORMAL STRESS TEST: ICD-10-CM

## 2020-02-19 LAB
ALBUMIN SERPL-MCNC: 4.2 G/DL (ref 3.4–5)
ALP SERPL-CCNC: 87 U/L (ref 40–150)
ALT SERPL W P-5'-P-CCNC: 29 U/L (ref 0–50)
ANION GAP SERPL CALCULATED.3IONS-SCNC: 3 MMOL/L (ref 3–14)
AST SERPL W P-5'-P-CCNC: 17 U/L (ref 0–45)
BASOPHILS # BLD AUTO: 0 10E9/L (ref 0–0.2)
BASOPHILS NFR BLD AUTO: 0.2 %
BILIRUB SERPL-MCNC: 0.7 MG/DL (ref 0.2–1.3)
BUN SERPL-MCNC: 20 MG/DL (ref 7–30)
CALCIUM SERPL-MCNC: 9.5 MG/DL (ref 8.5–10.1)
CHLORIDE SERPL-SCNC: 106 MMOL/L (ref 94–109)
CO2 SERPL-SCNC: 28 MMOL/L (ref 20–32)
CREAT SERPL-MCNC: 0.81 MG/DL (ref 0.52–1.04)
DIFFERENTIAL METHOD BLD: ABNORMAL
EOSINOPHIL # BLD AUTO: 0.1 10E9/L (ref 0–0.7)
EOSINOPHIL NFR BLD AUTO: 0.6 %
ERYTHROCYTE [DISTWIDTH] IN BLOOD BY AUTOMATED COUNT: 13.9 % (ref 10–15)
GFR SERPL CREATININE-BSD FRML MDRD: 74 ML/MIN/{1.73_M2}
GLUCOSE SERPL-MCNC: 99 MG/DL (ref 70–99)
HCT VFR BLD AUTO: 47.4 % (ref 35–47)
HGB BLD-MCNC: 15.7 G/DL (ref 11.7–15.7)
LYMPHOCYTES # BLD AUTO: 1.5 10E9/L (ref 0.8–5.3)
LYMPHOCYTES NFR BLD AUTO: 18.4 %
MCH RBC QN AUTO: 30.6 PG (ref 26.5–33)
MCHC RBC AUTO-ENTMCNC: 33.1 G/DL (ref 31.5–36.5)
MCV RBC AUTO: 92 FL (ref 78–100)
MONOCYTES # BLD AUTO: 0.5 10E9/L (ref 0–1.3)
MONOCYTES NFR BLD AUTO: 5.6 %
NEUTROPHILS # BLD AUTO: 6.2 10E9/L (ref 1.6–8.3)
NEUTROPHILS NFR BLD AUTO: 75.2 %
PLATELET # BLD AUTO: 247 10E9/L (ref 150–450)
POTASSIUM SERPL-SCNC: 4 MMOL/L (ref 3.4–5.3)
PROT SERPL-MCNC: 7.7 G/DL (ref 6.8–8.8)
RBC # BLD AUTO: 5.13 10E12/L (ref 3.8–5.2)
SODIUM SERPL-SCNC: 137 MMOL/L (ref 133–144)
TSH SERPL DL<=0.005 MIU/L-ACNC: 2.61 MU/L (ref 0.4–4)
WBC # BLD AUTO: 8.3 10E9/L (ref 4–11)

## 2020-02-19 PROCEDURE — 80050 GENERAL HEALTH PANEL: CPT | Performed by: PHYSICIAN ASSISTANT

## 2020-02-19 PROCEDURE — 36415 COLL VENOUS BLD VENIPUNCTURE: CPT | Performed by: PHYSICIAN ASSISTANT

## 2020-02-19 PROCEDURE — 93000 ELECTROCARDIOGRAM COMPLETE: CPT | Performed by: PHYSICIAN ASSISTANT

## 2020-02-19 PROCEDURE — 99214 OFFICE O/P EST MOD 30 MIN: CPT | Performed by: PHYSICIAN ASSISTANT

## 2020-02-19 PROCEDURE — 71046 X-RAY EXAM CHEST 2 VIEWS: CPT

## 2020-02-19 ASSESSMENT — MIFFLIN-ST. JEOR: SCORE: 1209.11

## 2020-02-19 NOTE — PROGRESS NOTES
"Subjective     Debby Chan is a 69 year old female who presents to clinic today for the following health issues:    HPI   RESPIRATORY SYMPTOMS      Duration: A month or two    Description  cough, fatigue/malaise and becomes short of breath from stairs.     Severity: mild    Accompanying signs and symptoms: None    History (predisposing factors):  none    Precipitating or alleviating factors: None    Therapies tried and outcome:  rest and fluids    Patient notes her acid reflux has been worse the past month.Patient notes pain more on right side than middle. Patient feels tired all the time. Patient notes cough in the morning and night. Patient has shortness of breath with stairs or when cleaning. She notes she has to catch her breath. Pain described as an ache on right side of her chest.  - pain comes and goes   - duration: has been there a couple day   - unsure of trigger   - taking zantac as needed  - not taking sucralfate     Reviewed and updated as needed this visit by Provider  Meds  Problems             Objective    /64   Pulse 84   Temp 98.2  F (36.8  C) (Oral)   Resp 18   Ht 1.645 m (5' 4.75\")   Wt 68.7 kg (151 lb 8 oz)   SpO2 99%   BMI 25.41 kg/m    Body mass index is 25.41 kg/m .  Physical Exam   GENERAL: healthy, alert and no distress  RESP: lungs clear to auscultation - no rales, rhonchi or wheezes  CV: regular rates and rhythm, normal S1 S2, no S3 or S4 and no murmur, click or rub  ABDOMEN: soft, without hepatosplenomegaly or masses, tenderness epigastric and bowel sounds normal  MSC: tenderness on right side of chest where pain is located     Diagnostic Test Results:  Labs reviewed in Epic        Assessment & Plan     1. Atypical chest pain  - work up as below, more for concern for HERNANDEZ as chest pain reproduced on exam   - EKG 12-lead complete w/read - Clinics  - Echocardiogram Exercise Stress; Future  - XR Chest 2 Views    2. Gastroesophageal reflux disease, esophagitis " presence not specified  - referral as patient has uncontrolled acid reflux, could be contributing to symptoms above   - GASTROENTEROLOGY ADULT REF CONSULT ONLY    3. Fatigue, unspecified type  - further work up as below   - CBC with platelets and differential  - Comprehensive metabolic panel  - TSH with free T4 reflex       Return in about 1 month (around 3/19/2020) for primary care provider follow up.    Caridad Sanchez PA-C  [facility]

## 2020-02-24 ENCOUNTER — TRANSFERRED RECORDS (OUTPATIENT)
Dept: HEALTH INFORMATION MANAGEMENT | Facility: CLINIC | Age: 70
End: 2020-02-24

## 2020-02-25 ENCOUNTER — HOSPITAL ENCOUNTER (OUTPATIENT)
Dept: CARDIOLOGY | Facility: CLINIC | Age: 70
Discharge: HOME | End: 2020-02-25
Attending: PHYSICIAN ASSISTANT | Admitting: PHYSICIAN ASSISTANT
Payer: COMMERCIAL

## 2020-02-25 DIAGNOSIS — R07.89 ATYPICAL CHEST PAIN: ICD-10-CM

## 2020-02-25 PROCEDURE — 93016 CV STRESS TEST SUPVJ ONLY: CPT | Performed by: INTERNAL MEDICINE

## 2020-02-25 PROCEDURE — 93325 DOPPLER ECHO COLOR FLOW MAPG: CPT | Mod: 26 | Performed by: INTERNAL MEDICINE

## 2020-02-25 PROCEDURE — 25500064 ZZH RX 255 OP 636: Performed by: PHYSICIAN ASSISTANT

## 2020-02-25 PROCEDURE — 93321 DOPPLER ECHO F-UP/LMTD STD: CPT | Mod: 26 | Performed by: INTERNAL MEDICINE

## 2020-02-25 PROCEDURE — 93350 STRESS TTE ONLY: CPT | Mod: 26 | Performed by: INTERNAL MEDICINE

## 2020-02-25 PROCEDURE — 93018 CV STRESS TEST I&R ONLY: CPT | Performed by: INTERNAL MEDICINE

## 2020-02-25 PROCEDURE — 93321 DOPPLER ECHO F-UP/LMTD STD: CPT | Mod: TC

## 2020-02-25 RX ADMIN — HUMAN ALBUMIN MICROSPHERES AND PERFLUTREN 9 ML: 10; .22 INJECTION, SOLUTION INTRAVENOUS at 11:31

## 2020-03-02 ENCOUNTER — OFFICE VISIT (OUTPATIENT)
Dept: CARDIOLOGY | Facility: CLINIC | Age: 70
End: 2020-03-02
Attending: PHYSICIAN ASSISTANT
Payer: COMMERCIAL

## 2020-03-02 VITALS
DIASTOLIC BLOOD PRESSURE: 70 MMHG | HEIGHT: 65 IN | SYSTOLIC BLOOD PRESSURE: 128 MMHG | WEIGHT: 151 LBS | HEART RATE: 80 BPM | BODY MASS INDEX: 25.16 KG/M2

## 2020-03-02 DIAGNOSIS — E78.00 PURE HYPERCHOLESTEROLEMIA: ICD-10-CM

## 2020-03-02 DIAGNOSIS — R06.09 DOE (DYSPNEA ON EXERTION): Primary | ICD-10-CM

## 2020-03-02 DIAGNOSIS — R07.89 ATYPICAL CHEST PAIN: ICD-10-CM

## 2020-03-02 DIAGNOSIS — R94.39 ABNORMAL STRESS TEST: ICD-10-CM

## 2020-03-02 PROCEDURE — 99204 OFFICE O/P NEW MOD 45 MIN: CPT | Performed by: INTERNAL MEDICINE

## 2020-03-02 ASSESSMENT — MIFFLIN-ST. JEOR: SCORE: 1206.84

## 2020-03-02 NOTE — LETTER
3/2/2020    Ida Lyles MD  [address] [facility] [address]    RE: Debby Chan       Dear Colleague,    I had the pleasure of seeing Debby Chan in the [facility].    HPI and Plan:   See dictation    Orders Placed This Encounter   Procedures     CTA Angiogram coronary artery     Follow-Up with Cardiac Advanced Practice Provider       No orders of the defined types were placed in this encounter.      There are no discontinued medications.      Encounter Diagnoses   Name Primary?     Abnormal stress test      HERNANDEZ (dyspnea on exertion) Yes     Pure hypercholesterolemia      Atypical chest pain        CURRENT MEDICATIONS:  Current Outpatient Medications   Medication Sig Dispense Refill     atorvastatin (LIPITOR) 20 MG tablet TAKE 1 TABLET BY MOUTH  DAILY 90 tablet 0     Calcium Carbonate-Vitamin D (CALCIUM + D PO) Take  by mouth. Plus magnesium       Cholecalciferol (VITAMIN D) 2000 UNITS tablet Take 1 tablet by mouth daily.       omeprazole (PRILOSEC) 40 MG DR capsule TAKE 1 CAPSULE BY MOUTH  DAILY 30-60 MINUTES BEFORE  A MEAL 90 capsule 2     ranitidine (ZANTAC) 150 MG/10ML syrup Take 10 mLs (150 mg) by mouth 2 times daily 600 mL 10     triamcinolone (KENALOG) 0.025 % cream Apply topically 2 times daily A thin layer to eyelids x 1-2 weeks. 15 g 0     triamcinolone (KENALOG) 0.1 % external ointment Apply sparingly to affected area three times daily for 14 days. 30 g 0     sucralfate (CARAFATE) 1 GM tablet Take 1 tablet (1 g) by mouth 4 times daily (Patient not taking: Reported on 3/2/2020) 30 tablet 1       ALLERGIES     Allergies   Allergen Reactions     Penicillins Nausea       PAST MEDICAL HISTORY:  Past Medical History:   Diagnosis Date     Acid reflux disease      Chest pain      HERNANDEZ (dyspnea on exertion)      Impaired fasting glucose      Pure hypercholesterolemia        PAST SURGICAL HISTORY:  Past Surgical History:   Procedure Laterality Date      BREAST BIOPSY, RT/LT Right 1985    benign fibroadenoma     ESOPHAGOSCOPY, GASTROSCOPY, DUODENOSCOPY (EGD), COMBINED N/A 12/21/2017    Procedure: COMBINED ESOPHAGOSCOPY, GASTROSCOPY, DUODENOSCOPY (EGD), BIOPSY SINGLE OR MULTIPLE;  gastroscopy;  Surgeon: Srinivasa Jackson MD;  Location:  GI     TUBAL LIGATION  1990       FAMILY HISTORY:  Family History   Problem Relation Age of Onset     Myocardial Infarction Father         later in life; s/p PCI     Prostate Cancer Father      Diabetes Type 2  Father      Skin Cancer Father      Coronary Artery Disease Father      Heart Failure Mother      Uterine Cancer Paternal Aunt      Melanoma Sister      Cerebrovascular Disease No family hx of      Coronary Artery Disease Early Onset No family hx of      Ovarian Cancer No family hx of      Colon Cancer No family hx of        SOCIAL HISTORY:  Social History     Socioeconomic History     Marital status:      Spouse name: None     Number of children: None     Years of education: None     Highest education level: None   Occupational History     Occupation: Part-time at [employer]     Occupation: Retired from [employer]   Social Needs     Financial resource strain: None     Food insecurity:     Worry: None     Inability: None     Transportation needs:     Medical: None     Non-medical: None   Tobacco Use     Smoking status: Never Smoker     Smokeless tobacco: Never Used   Substance and Sexual Activity     Alcohol use: Yes     Frequency: 2-3 times a week     Drinks per session: 1 or 2     Binge frequency: Never     Comment: occasional     Drug use: No     Sexual activity: Not Currently     Partners: Male   Lifestyle     Physical activity:     Days per week: None     Minutes per session: None     Stress: None   Relationships     Social connections:     Talks on phone: None     Gets together: None     Attends [employer] service: None     Active member of club or organization: None     Attends meetings of clubs or organizations: None  "    Relationship status: None     Intimate partner violence:     Fear of current or ex partner: None     Emotionally abused: None     Physically abused: None     Forced sexual activity: None   Other Topics Concern     Parent/sibling w/ CABG, MI or angioplasty before 65F 55M? No   Social History Narrative    .    No kids.    No formal exercise, but stays active.        Review of Systems:  Skin:  Negative       Eyes:  Negative      ENT:  Negative      Respiratory:  Positive for dyspnea on exertion     Cardiovascular:    Positive for;chest pain;lightheadedness    Gastroenterology: Positive for heartburn;reflux    Genitourinary:  not assessed      Musculoskeletal:  Positive for joint pain    Neurologic:  Positive for headaches(occasional sharp pain in temple area)    Psychiatric:  Negative      Heme/Lymph/Imm:  Negative      Endocrine:  Negative        Physical Exam:  Vitals: /70   Pulse 80   Ht 1.645 m (5' 4.75\")   Wt 68.5 kg (151 lb)   BMI 25.32 kg/m       Constitutional:  cooperative, alert and oriented, well developed, well nourished, in no acute distress;cooperative        Skin:  warm and dry to the touch, no apparent skin lesions or masses noted          Head:  normocephalic, no masses or lesions        Eyes:  pupils equal and round        Lymph:No Cervical lymphadenopathy present     ENT:  no pallor or cyanosis        Neck:  carotid pulses are full and equal bilaterally, JVP normal, no carotid bruit        Respiratory:  normal breath sounds, clear to auscultation, normal A-P diameter, normal symmetry, normal respiratory excursion, no use of accessory muscles         Cardiac: regular rhythm, normal S1/S2, no S3 or S4, apical impulse not displaced, no murmurs, gallops or rubs                pulses full and equal                                        GI:  not assessed this visit        Extremities and Muscular Skeletal:  no deformities, clubbing, cyanosis, erythema observed;no edema          "     Neurological:  no gross motor deficits;affect appropriate        Psych:  Alert and Oriented x 3        CC  Caridad Sanchez PA-C  [address] [address]                Thank you for allowing me to participate in the care of your patient.      Sincerely,     Ranulfo Martin MD, MD     [facility]    cc:   Caridad Sanchez PA-C  [address] [facility] [address]  [place] [address]

## 2020-03-02 NOTE — PROGRESS NOTES
HPI and Plan:   See dictation    Orders Placed This Encounter   Procedures     CTA Angiogram coronary artery     Follow-Up with Cardiac Advanced Practice Provider       No orders of the defined types were placed in this encounter.      There are no discontinued medications.      Encounter Diagnoses   Name Primary?     Abnormal stress test      HERNANDEZ (dyspnea on exertion) Yes     Pure hypercholesterolemia      Atypical chest pain        CURRENT MEDICATIONS:  Current Outpatient Medications   Medication Sig Dispense Refill     atorvastatin (LIPITOR) 20 MG tablet TAKE 1 TABLET BY MOUTH  DAILY 90 tablet 0     Calcium Carbonate-Vitamin D (CALCIUM + D PO) Take  by mouth. Plus magnesium       Cholecalciferol (VITAMIN D) 2000 UNITS tablet Take 1 tablet by mouth daily.       omeprazole (PRILOSEC) 40 MG DR capsule TAKE 1 CAPSULE BY MOUTH  DAILY 30-60 MINUTES BEFORE  A MEAL 90 capsule 2     ranitidine (ZANTAC) 150 MG/10ML syrup Take 10 mLs (150 mg) by mouth 2 times daily 600 mL 10     triamcinolone (KENALOG) 0.025 % cream Apply topically 2 times daily A thin layer to eyelids x 1-2 weeks. 15 g 0     triamcinolone (KENALOG) 0.1 % external ointment Apply sparingly to affected area three times daily for 14 days. 30 g 0     sucralfate (CARAFATE) 1 GM tablet Take 1 tablet (1 g) by mouth 4 times daily (Patient not taking: Reported on 3/2/2020) 30 tablet 1       ALLERGIES     Allergies   Allergen Reactions     Penicillins Nausea       PAST MEDICAL HISTORY:  Past Medical History:   Diagnosis Date     Acid reflux disease      Chest pain      HERNANDEZ (dyspnea on exertion)      Impaired fasting glucose      Pure hypercholesterolemia        PAST SURGICAL HISTORY:  Past Surgical History:   Procedure Laterality Date     BREAST BIOPSY, RT/LT Right 1985    benign fibroadenoma     ESOPHAGOSCOPY, GASTROSCOPY, DUODENOSCOPY (EGD), COMBINED N/A 12/21/2017    Procedure: COMBINED ESOPHAGOSCOPY, GASTROSCOPY, DUODENOSCOPY (EGD), BIOPSY SINGLE OR MULTIPLE;   gastroscopy;  Surgeon: Srinivasa Jackson MD;  Location:  GI     TUBAL LIGATION  1990       FAMILY HISTORY:  Family History   Problem Relation Age of Onset     Myocardial Infarction Father         later in life; s/p PCI     Prostate Cancer Father      Diabetes Type 2  Father      Skin Cancer Father      Coronary Artery Disease Father      Heart Failure Mother      Uterine Cancer Paternal Aunt      Melanoma Sister      Cerebrovascular Disease No family hx of      Coronary Artery Disease Early Onset No family hx of      Ovarian Cancer No family hx of      Colon Cancer No family hx of        SOCIAL HISTORY:  Social History     Socioeconomic History     Marital status:      Spouse name: None     Number of children: None     Years of education: None     Highest education level: None   Occupational History     Occupation: Part-time at [employer]     Occupation: Retired from [employer]   Social Needs     Financial resource strain: None     Food insecurity:     Worry: None     Inability: None     Transportation needs:     Medical: None     Non-medical: None   Tobacco Use     Smoking status: Never Smoker     Smokeless tobacco: Never Used   Substance and Sexual Activity     Alcohol use: Yes     Frequency: 2-3 times a week     Drinks per session: 1 or 2     Binge frequency: Never     Comment: occasional     Drug use: No     Sexual activity: Not Currently     Partners: Male   Lifestyle     Physical activity:     Days per week: None     Minutes per session: None     Stress: None   Relationships     Social connections:     Talks on phone: None     Gets together: None     Attends Faith service: None     Active member of club or organization: None     Attends meetings of clubs or organizations: None     Relationship status: None     Intimate partner violence:     Fear of current or ex partner: None     Emotionally abused: None     Physically abused: None     Forced sexual activity: None   Other Topics Concern      "Parent/sibling w/ CABG, MI or angioplasty before 65F 55M? No   Social History Narrative    .    No kids.    No formal exercise, but stays active.        Review of Systems:  Skin:  Negative       Eyes:  Negative      ENT:  Negative      Respiratory:  Positive for dyspnea on exertion     Cardiovascular:    Positive for;chest pain;lightheadedness    Gastroenterology: Positive for heartburn;reflux    Genitourinary:  not assessed      Musculoskeletal:  Positive for joint pain    Neurologic:  Positive for headaches(occasional sharp pain in temple area)    Psychiatric:  Negative      Heme/Lymph/Imm:  Negative      Endocrine:  Negative        Physical Exam:  Vitals: /70   Pulse 80   Ht 1.645 m (5' 4.75\")   Wt 68.5 kg (151 lb)   BMI 25.32 kg/m      Constitutional:  cooperative, alert and oriented, well developed, well nourished, in no acute distress;cooperative        Skin:  warm and dry to the touch, no apparent skin lesions or masses noted          Head:  normocephalic, no masses or lesions        Eyes:  pupils equal and round        Lymph:No Cervical lymphadenopathy present     ENT:  no pallor or cyanosis        Neck:  carotid pulses are full and equal bilaterally, JVP normal, no carotid bruit        Respiratory:  normal breath sounds, clear to auscultation, normal A-P diameter, normal symmetry, normal respiratory excursion, no use of accessory muscles         Cardiac: regular rhythm, normal S1/S2, no S3 or S4, apical impulse not displaced, no murmurs, gallops or rubs                pulses full and equal                                        GI:  not assessed this visit        Extremities and Muscular Skeletal:  no deformities, clubbing, cyanosis, erythema observed;no edema              Neurological:  no gross motor deficits;affect appropriate        Psych:  Alert and Oriented x 3        CC  Caridad Sanchez PA-C  [address] [place] [address]              "

## 2020-03-02 NOTE — PROGRESS NOTES
Service Date: 2020      CARDIOLOGY CONSULTATION       REFERRING HEALTHCARE PROVIDER:  Caridad Sanchez        HISTORY OF PRESENT ILLNESS:  It is my pleasure to see your patient, Debby Chan.  This is a patient is a 69-year-old patient who has a longstanding history of what has been diagnosed as reflux esophagitis and has been on long-term proton blockers for this.  The pain is described as a retrosternal burning discomfort.  Antacids do not reliably relieve the discomfort immediately.  She recently saw a gastroenterologist approximately a week ago who also placed her on ranitidine syrup 150 mg twice a day as well as omeprazole and he has recommended the omeprazole be taken twice a day also.  The discomfort as I mentioned, is a burning-type sensation retrosternally.  It is not associated with exertion.  It is not relieved by rest.  It does not radiate to the arms or to the jaw.  She has recently noticed some shortness of breath on exertion on going up stairs which is a new symptom for her.  The shortness of breath is relieved by rest.  She did have a stress echocardiogram performed which took place approximately a week ago.  The stress EKG portion was abnormal with 1 mm of ST depression in the anterior and inferior leads.  However, the stress echo portion was entirely normal.  No wall motion abnormalities were seen.  The patient also had no chest discomfort on the treadmill.  No significant valvular heart disease was noted.  She did not have a hypertensive response to exercise.  She does have a family history of coronary artery disease with the father having had a myocardial infarct in his 60s.  He ultimately  in his 80s of prostate cancer.  Her mother had congestive heart failure as did her grandfather.  Her 12-lead electrocardiogram was normal.      IMPRESSION:   1.  Chest discomfort.  This is atypical for angina pectoris.  Most likely, this is reflux but it is not responding to the typical  medications that relieve it such as antacids.  Cardiac disease in women can present atypically.  The stress EKG portion was abnormal but the stress echo portion was normal.  The patient has also developed shortness of breath on exertion which is a new finding for her.  She is in the intermediate range for cardiac disease.   2.  Family history of coronary artery disease.   3.  Hyperlipidemia which is being treated with atorvastatin 20 mg with a good lipid profile in November with an LDL of 75, HDL of 57 and triglycerides of 119 which is well within primary prevention guidelines.      PLAN:  I think the best way to determine if this patient truly has coronary artery disease or not is to perform a CT angiogram.  This will let us know if there are any flow-limiting lesions, and if it is negative, then the gastroenterologist can concentrate fully on trying to relieve the symptoms of reflux if that is what is causing her discomfort.  The patient was happy with that and we will see her back with the results of the CT angiogram.  As always, she has been told to contact us with any questions or any concerns.      cc:   Caridad Sanchez PA-C    [facility]   [address]   [place] [address]      Ida Lyles MD    [facility]   [address] [place] [address]         CARLOS GREENBERG MD, [facility]             D: 2020   T: 2020   MT: AJ      Name:     LISBETH HAYES   MRN:      [id number]        Account:      [id number]   :      1950           Service Date: 2020      Document: [id number]

## 2020-03-02 NOTE — LETTER
3/2/2020      Ida Lyles MD  [address] [facility] [address]      RE: Debby Chan       Dear Colleague,    I had the pleasure of seeing Debby Chan in the [facility].    Service Date: 2020      CARDIOLOGY CONSULTATION       REFERRING HEALTHCARE PROVIDER:  Caridad Sanchez        HISTORY OF PRESENT ILLNESS:  It is my pleasure to see your patient, Debby Chan.  This is a patient is a 69-year-old patient who has a longstanding history of what has been diagnosed as reflux esophagitis and has been on long-term proton blockers for this.  The pain is described as a retrosternal burning discomfort.  Antacids do not reliably relieve the discomfort immediately.  She recently saw a gastroenterologist approximately a week ago who also placed her on ranitidine syrup 150 mg twice a day as well as omeprazole and he has recommended the omeprazole be taken twice a day also.  The discomfort as I mentioned, is a burning-type sensation retrosternally.  It is not associated with exertion.  It is not relieved by rest.  It does not radiate to the arms or to the jaw.  She has recently noticed some shortness of breath on exertion on going up stairs which is a new symptom for her.  The shortness of breath is relieved by rest.  She did have a stress echocardiogram performed which took place approximately a week ago.  The stress EKG portion was abnormal with 1 mm of ST depression in the anterior and inferior leads.  However, the stress echo portion was entirely normal.  No wall motion abnormalities were seen.  The patient also had no chest discomfort on the treadmill.  No significant valvular heart disease was noted.  She did not have a hypertensive response to exercise.  She does have a family history of coronary artery disease with the father having had a myocardial infarct in his 60s.  He ultimately  in his 80s of prostate cancer.  Her mother had  congestive heart failure as did her grandfather.  Her 12-lead electrocardiogram was normal.      IMPRESSION:   1.  Chest discomfort.  This is atypical for angina pectoris.  Most likely, this is reflux but it is not responding to the typical medications that relieve it such as antacids.  Cardiac disease in women can present atypically.  The stress EKG portion was abnormal but the stress echo portion was normal.  The patient has also developed shortness of breath on exertion which is a new finding for her.  She is in the intermediate range for cardiac disease.   2.  Family history of coronary artery disease.   3.  Hyperlipidemia which is being treated with atorvastatin 20 mg with a good lipid profile in November with an LDL of 75, HDL of 57 and triglycerides of 119 which is well within primary prevention guidelines.      PLAN:  I think the best way to determine if this patient truly has coronary artery disease or not is to perform a CT angiogram.  This will let us know if there are any flow-limiting lesions, and if it is negative, then the gastroenterologist can concentrate fully on trying to relieve the symptoms of reflux if that is what is causing her discomfort.  The patient was happy with that and we will see her back with the results of the CT angiogram.  As always, she has been told to contact us with any questions or any concerns.      cc:   Caridad Sanchez PA-C    [facility]   [address]   [place] [address]      Ida Lyles MD    [facility]   [address] [place] [address]         CARLOS GREENBERG MD, [facility]             D: 2020   T: 2020   MT: JA      Name:     LISBETH HAYES   MRN:      [id number]        Account:      [id number]   :      1950           Service Date: 2020      Document: [id number]         Outpatient Encounter Medications as of 3/2/2020   Medication Sig Dispense Refill     atorvastatin  (LIPITOR) 20 MG tablet TAKE 1 TABLET BY MOUTH  DAILY 90 tablet 0     Calcium Carbonate-Vitamin D (CALCIUM + D PO) Take  by mouth. Plus magnesium       Cholecalciferol (VITAMIN D) 2000 UNITS tablet Take 1 tablet by mouth daily.       omeprazole (PRILOSEC) 40 MG DR capsule TAKE 1 CAPSULE BY MOUTH  DAILY 30-60 MINUTES BEFORE  A MEAL 90 capsule 2     ranitidine (ZANTAC) 150 MG/10ML syrup Take 10 mLs (150 mg) by mouth 2 times daily 600 mL 10     triamcinolone (KENALOG) 0.025 % cream Apply topically 2 times daily A thin layer to eyelids x 1-2 weeks. 15 g 0     triamcinolone (KENALOG) 0.1 % external ointment Apply sparingly to affected area three times daily for 14 days. 30 g 0     sucralfate (CARAFATE) 1 GM tablet Take 1 tablet (1 g) by mouth 4 times daily (Patient not taking: Reported on 3/2/2020) 30 tablet 1     No facility-administered encounter medications on file as of 3/2/2020.        Again, thank you for allowing me to participate in the care of your patient.      Sincerely,    Ranulfo Martin MD, MD     [facility]

## 2020-03-04 ENCOUNTER — TELEPHONE (OUTPATIENT)
Dept: CARDIOLOGY | Facility: CLINIC | Age: 70
End: 2020-03-04

## 2020-03-04 ENCOUNTER — HOSPITAL ENCOUNTER (OUTPATIENT)
Dept: CARDIOLOGY | Facility: CLINIC | Age: 70
Discharge: HOME | End: 2020-03-04
Attending: INTERNAL MEDICINE | Admitting: INTERNAL MEDICINE
Payer: COMMERCIAL

## 2020-03-04 VITALS — HEART RATE: 59 BPM | DIASTOLIC BLOOD PRESSURE: 61 MMHG | SYSTOLIC BLOOD PRESSURE: 101 MMHG

## 2020-03-04 DIAGNOSIS — R07.89 ATYPICAL CHEST PAIN: ICD-10-CM

## 2020-03-04 DIAGNOSIS — R94.39 ABNORMAL STRESS TEST: ICD-10-CM

## 2020-03-04 DIAGNOSIS — R06.09 DOE (DYSPNEA ON EXERTION): ICD-10-CM

## 2020-03-04 DIAGNOSIS — R94.39 ABNORMAL STRESS TEST: Primary | ICD-10-CM

## 2020-03-04 PROCEDURE — 75574 CT ANGIO HRT W/3D IMAGE: CPT

## 2020-03-04 PROCEDURE — 75574 CT ANGIO HRT W/3D IMAGE: CPT | Mod: 26 | Performed by: INTERNAL MEDICINE

## 2020-03-04 PROCEDURE — 25000128 H RX IP 250 OP 636: Performed by: INTERNAL MEDICINE

## 2020-03-04 PROCEDURE — 25000125 ZZHC RX 250: Performed by: INTERNAL MEDICINE

## 2020-03-04 PROCEDURE — 25000132 ZZH RX MED GY IP 250 OP 250 PS 637: Performed by: INTERNAL MEDICINE

## 2020-03-04 RX ORDER — DIPHENHYDRAMINE HCL 25 MG
25 CAPSULE ORAL
Status: DISCONTINUED | OUTPATIENT
Start: 2020-03-04 | End: 2020-03-05 | Stop reason: HOSPADM

## 2020-03-04 RX ORDER — METOPROLOL TARTRATE 50 MG
50-100 TABLET ORAL
Status: COMPLETED | OUTPATIENT
Start: 2020-03-04 | End: 2020-03-04

## 2020-03-04 RX ORDER — DILTIAZEM HYDROCHLORIDE 120 MG/1
120 TABLET, FILM COATED ORAL
Status: DISCONTINUED | OUTPATIENT
Start: 2020-03-04 | End: 2020-03-05 | Stop reason: HOSPADM

## 2020-03-04 RX ORDER — METHYLPREDNISOLONE SODIUM SUCCINATE 125 MG/2ML
125 INJECTION, POWDER, LYOPHILIZED, FOR SOLUTION INTRAMUSCULAR; INTRAVENOUS
Status: DISCONTINUED | OUTPATIENT
Start: 2020-03-04 | End: 2020-03-05 | Stop reason: HOSPADM

## 2020-03-04 RX ORDER — DILTIAZEM HYDROCHLORIDE 5 MG/ML
10-15 INJECTION INTRAVENOUS
Status: DISCONTINUED | OUTPATIENT
Start: 2020-03-04 | End: 2020-03-05 | Stop reason: HOSPADM

## 2020-03-04 RX ORDER — DIPHENHYDRAMINE HYDROCHLORIDE 50 MG/ML
25-50 INJECTION INTRAMUSCULAR; INTRAVENOUS
Status: DISCONTINUED | OUTPATIENT
Start: 2020-03-04 | End: 2020-03-05 | Stop reason: HOSPADM

## 2020-03-04 RX ORDER — ONDANSETRON 2 MG/ML
4 INJECTION INTRAMUSCULAR; INTRAVENOUS
Status: DISCONTINUED | OUTPATIENT
Start: 2020-03-04 | End: 2020-03-05 | Stop reason: HOSPADM

## 2020-03-04 RX ORDER — ACYCLOVIR 200 MG/1
0-1 CAPSULE ORAL
Status: DISCONTINUED | OUTPATIENT
Start: 2020-03-04 | End: 2020-03-05 | Stop reason: HOSPADM

## 2020-03-04 RX ORDER — NITROGLYCERIN 0.4 MG/1
0.4 TABLET SUBLINGUAL
Status: DISCONTINUED | OUTPATIENT
Start: 2020-03-04 | End: 2020-03-05 | Stop reason: HOSPADM

## 2020-03-04 RX ORDER — METOPROLOL TARTRATE 1 MG/ML
5-15 INJECTION, SOLUTION INTRAVENOUS
Status: DISCONTINUED | OUTPATIENT
Start: 2020-03-04 | End: 2020-03-05 | Stop reason: HOSPADM

## 2020-03-04 RX ORDER — IOPAMIDOL 755 MG/ML
500 INJECTION, SOLUTION INTRAVASCULAR ONCE
Status: COMPLETED | OUTPATIENT
Start: 2020-03-04 | End: 2020-03-04

## 2020-03-04 RX ADMIN — IOPAMIDOL 120 ML: 755 INJECTION, SOLUTION INTRAVENOUS at 14:05

## 2020-03-04 RX ADMIN — METOPROLOL TARTRATE 5 MG: 5 INJECTION INTRAVENOUS at 13:52

## 2020-03-04 RX ADMIN — METOPROLOL TARTRATE 100 MG: 50 TABLET, FILM COATED ORAL at 12:51

## 2020-03-04 RX ADMIN — NITROGLYCERIN 0.4 MG: 0.4 TABLET SUBLINGUAL at 13:47

## 2020-03-04 NOTE — TELEPHONE ENCOUNTER
Patient scheduled for OV with ROBER Jeanne RIVERA on 3/10/20, but RN will send to Dr. Martin for review and recommendation d/t findings of Severe mid left anterior descending artery stenosis.      3/4/20 CT angio results  FINDINGS:     CORONARY CALCIUM SCORE: The total Agatston calcium score is 10.81,  Left main: 0, left anterior descendin,  circumflex: 10.81, right  coronary artery: 0. This places the patient in the 51st percentile  when compared to age and gender matched control group.     CORONARY ANGIOGRAPHY :   1. 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  2. Moderate proximal ramus intermedius disease.  3. Trivial circumflex and right coronary artery disease.        3/2/20 OV Dr. Martin  IMPRESSION:   1.  Chest discomfort.  This is atypical for angina pectoris.  Most likely, this is reflux but it is not responding to the typical medications that relieve it such as antacids.  Cardiac disease in women can present atypically.  The stress EKG portion was abnormal but the stress echo portion was normal.  The patient has also developed shortness of breath on exertion which is a new finding for her.  She is in the intermediate range for cardiac disease.   2.  Family history of coronary artery disease.   3.  Hyperlipidemia which is being treated with atorvastatin 20 mg with a good lipid profile in November with an LDL of 75, HDL of 57 and triglycerides of 119 which is well within primary prevention guidelines.      PLAN:  I think the best way to determine if this patient truly has coronary artery disease or not is to perform a CT angiogram.  This will let us know if there are any flow-limiting lesions, and if it is negative, then the gastroenterologist can concentrate fully on trying to relieve the symptoms of reflux if that is what is causing her discomfort.  The patient was happy with that and we will see her back with the results of the CT angiogram.  As always, she has been told to contact us with  any questions or any concerns.

## 2020-03-04 NOTE — TELEPHONE ENCOUNTER
RN reviewed with Dr. Martin and he recommends left heart cath. RN subsequently called the patient and reviewed with her the plan. Patient verbalized understanding and is in agreement with plan. RN subsequently called scheduling and they are going to contact us tomorrow morning to determine time for add on for cath.     RN called patient and updated her that we will be reaching out to her tomorrow to confirm date and time of f/u apt and cath. Patient verbalized understanding and is aware she needs to contact us with any further progression in her symptoms.

## 2020-03-05 PROBLEM — R94.39 ABNORMAL STRESS TEST: Status: ACTIVE | Noted: 2020-03-05

## 2020-03-06 ENCOUNTER — OFFICE VISIT (OUTPATIENT)
Dept: CARDIOLOGY | Facility: CLINIC | Age: 70
End: 2020-03-06
Attending: INTERNAL MEDICINE
Payer: COMMERCIAL

## 2020-03-06 VITALS
HEIGHT: 65 IN | WEIGHT: 151 LBS | SYSTOLIC BLOOD PRESSURE: 128 MMHG | BODY MASS INDEX: 25.16 KG/M2 | HEART RATE: 85 BPM | DIASTOLIC BLOOD PRESSURE: 75 MMHG

## 2020-03-06 DIAGNOSIS — R93.1 ABNORMAL CARDIAC CT ANGIOGRAPHY: Primary | ICD-10-CM

## 2020-03-06 DIAGNOSIS — E78.00 PURE HYPERCHOLESTEROLEMIA: ICD-10-CM

## 2020-03-06 DIAGNOSIS — K21.9 GASTROESOPHAGEAL REFLUX DISEASE, ESOPHAGITIS PRESENCE NOT SPECIFIED: ICD-10-CM

## 2020-03-06 DIAGNOSIS — R06.09 DOE (DYSPNEA ON EXERTION): ICD-10-CM

## 2020-03-06 PROCEDURE — 99214 OFFICE O/P EST MOD 30 MIN: CPT | Performed by: NURSE PRACTITIONER

## 2020-03-06 RX ORDER — METOPROLOL SUCCINATE 25 MG/1
25 TABLET, EXTENDED RELEASE ORAL DAILY
Qty: 30 TABLET | Refills: 1 | Status: SHIPPED | OUTPATIENT
Start: 2020-03-06 | End: 2020-03-20

## 2020-03-06 ASSESSMENT — MIFFLIN-ST. JEOR: SCORE: 1206.84

## 2020-03-06 NOTE — PROGRESS NOTES
Cardiology Clinic Progress Note  Debby Chan MRN# [id number]   YOB: 1950 Age: 69 year old     Reason For Visit:  H&P for angiogram    Primary Cardiologist:   Dr. Martin          History of Presenting Illness:      Debby Chan is a pleasant 69 year old patient who carries a past medical history significant for reflux, family history of premature coronary disease, and hyperlipidemia.     She was last seen on 3/2/20 for evaluation of atypical chest pain. Stress echo demonstrated no evidence of stress - induced ischemia, normal EF 65-70%, and no significant valvular disease. She subsequently underwent a CT angiogram that showed a total calcium score of 10.81 followed by coronary angiography that demonstrated severe mid LAD stenosis and moderate proximal ramus disease. Trivial disease noted in the circumflex and RCA. Results were reviewed by Dr. Martin who recommends coronary catheterization. She presents to the office today for H&P.      She continues to report mid sternal burning pain unrelieved by PPI,  Fatigue, and occasional headache.  She denies shortness of breath, PND, orthopnea, presyncope, syncope, edema, heart racing, or palpitations.     Blood pressure is well controlled at 128/75, HR 85  Last BMP showed a sodium of 137, potassium 4.0, BUN 20, Creatinine 0.81, and GFR 74  Hgb 15.7, and Plt 247  BMI 25.32    Activity level is unchanged   Follows a heart healthy diet.  Remains compliant with all medications.                   Assessment and Plan:       1. Abnormal CT coronary angiogram -  Total calcium score of 10.81 followed by CT coronary angiography that demonstrated severe mid LAD stenosis and moderate proximal ramus disease. Trivial disease noted in the circumflex and RCA. Recommended for coronary catheterization. Risks and benefits of coronary angiogram discussed today including, bleeding, bruising, infection, allergic reaction, kidney damage (including  need for dialysis), stroke, heart attack, vascular damage, emergency open heart surgery, up to and including death.  Patient indicates understanding and is agreeable to proceed. Started low dose Toprol 25mg daily. Instructed to take aspirin 325mg the evening prior and morning of angiogram. NPO after midnight the evening prior to angiogram. Ok to take medications the morning of procedure with a few sips of water. Instructed patient to seek ER evaluation if symptoms become severe.     2. Reflux - Continue on current medical therapy.   3. Hyperlipidemia - LDL 75, on low dose atorvastatin.                     Thank you for allowing me to participate in this delightful patient's care.        Jeanne Russ, KADEEM CNP         Review of Systems:     Review of Systems:  Skin:  Negative     Eyes:  Negative    ENT:  Negative    Respiratory:  Positive for dyspnea on exertion;shortness of breath;dyspnea at rest  Cardiovascular:    Positive for;chest pain;lightheadedness  Gastroenterology: Positive for heartburn;reflux  Genitourinary:  not assessed    Musculoskeletal:  Positive for joint pain  Neurologic:  Positive for headaches(occasional sharp pain in temple area)  Psychiatric:  Negative    Heme/Lymph/Imm:  Negative    Endocrine:  Negative                Physical Exam:     GEN:  In general, this is a well nourished female in no acute distress.  HEENT:  Pupils equal, round. Sclerae nonicteric. Clear oropharynx. Mucous membranes moist.  NECK: Supple, no masses appreciated. Trachea midline. No JVD   C/V:  Regular rate and rhythm, No murmur, rub or gallop. No S3 or RV heave.   RESP: Respirations are unlabored. No use of accessory muscles. Clear to auscultation bilaterally without wheezing, rales, or rhonchi.  GI: Abdomen soft, nontender, nondistended. No HSM appreciated.   EXTREM: No LE edema. No cyanosis or clubbing.  NEURO: Alert and oriented, cooperative. No obvious focal deficits.   PSYCH: Normal affect.  SKIN: Warm and dry.  No rashes or petechiae appreciated.          Past Medical History:     Past Medical History:   Diagnosis Date     Abnormal stress test 3/5/2020    Added automatically from request for surgery [id number]     Acid reflux disease      Chest pain      HERNANDEZ (dyspnea on exertion)      Family history of early CAD      GERD with esophagitis      Impaired fasting glucose      Pure hypercholesterolemia               Past Surgical History:     Past Surgical History:   Procedure Laterality Date     BREAST BIOPSY, RT/LT Right 1985    benign fibroadenoma     ESOPHAGOSCOPY, GASTROSCOPY, DUODENOSCOPY (EGD), COMBINED N/A 12/21/2017    Procedure: COMBINED ESOPHAGOSCOPY, GASTROSCOPY, DUODENOSCOPY (EGD), BIOPSY SINGLE OR MULTIPLE;  gastroscopy;  Surgeon: Srinivasa Jackson MD;  Location:  GI     TUBAL LIGATION  1990              Allergies:   Penicillins       Data:   All laboratory data reviewed:    LAST CHOLESTEROL:  Lab Results   Component Value Date    CHOL 156 11/05/2019     Lab Results   Component Value Date    HDL 57 11/05/2019     Lab Results   Component Value Date    LDL 75 11/05/2019     Lab Results   Component Value Date    TRIG 119 11/05/2019     Lab Results   Component Value Date    CHOLHDLRATIO 2.6 08/07/2015       LAST BMP:  Lab Results   Component Value Date     02/19/2020      Lab Results   Component Value Date    POTASSIUM 4.0 02/19/2020     Lab Results   Component Value Date    CHLORIDE 106 02/19/2020     Lab Results   Component Value Date    CARMINA 9.5 02/19/2020     Lab Results   Component Value Date    CO2 28 02/19/2020     Lab Results   Component Value Date    BUN 20 02/19/2020     Lab Results   Component Value Date    CR 0.81 02/19/2020     Lab Results   Component Value Date    GLC 99 02/19/2020       LAST CBC:  Lab Results   Component Value Date    WBC 8.3 02/19/2020     Lab Results   Component Value Date    RBC 5.13 02/19/2020     Lab Results   Component Value Date    HGB 15.7 02/19/2020     Lab Results    Component Value Date    HCT 47.4 02/19/2020     Lab Results   Component Value Date    MCV 92 02/19/2020     Lab Results   Component Value Date    MCH 30.6 02/19/2020     Lab Results   Component Value Date    MCHC 33.1 02/19/2020     Lab Results   Component Value Date    RDW 13.9 02/19/2020     Lab Results   Component Value Date     02/19/2020

## 2020-03-06 NOTE — PATIENT INSTRUCTIONS
Thanks for coming into [facility] today.    Reviewed results of CTA w/ recommendation for coronary angiogram   Risk and benefits reviewed with verbal understanding   Add low dose Toprol 25 mg daily  Aspirin 325mg the evening prior to procedure and day of procedure. ( patient has GI upset with aspirin)  Should you have severe pain between now and angiogram, seek ER evaluation.     ANGIOGRAM  [facility]   [place], [place]   Contact [facility] if needed at [phone], Option#2 or [phone].      1. In preparation for your procedure, we require that you do the following:    a. Nothing to eat or drink after midnight if your procedure is before 12 noon.  (If your procedure is scheduled after 12 noon, you may have a clear liquid  breakfast before 8:00am, then nothing else to eat or drink. See list below.)     b. Take your usual morning medications with a small sip of water on the day of your procedure unless you are on the following medications:    Aspirin:  o If you currently take Aspirin, continue taking your same dose.  o If you do not take Aspirin, take 325mg of Aspirin the day before and the morning of the procedure.    Coumadin or Warfarin:  o Stop Coumadin or Warfarin on:  __________________________    Pradaxa or Xarelto:  o Stop Pradaxa or Xarelto on:  ______________________________    Diabetic:  o If you take Glucophage (metformin) do not take the morning of the procedure or for 2 days after the procedure. Contact your Primary Care MD regarding glucose control for the days you do not take Glucophage.  o If you are on other oral diabetic medications do not take on the morning of the procedure.  o If you take Insulin contact your Primary Care MD for the recommended dosage to take the morning of the procedure.    Diuretic (Water Pill):  o If you take a diuretic (water pill), do not take the morning of the procedure.    c. If  "you have an allergy to dye, please contact the [facility] office 4 days before your procedure at [phone] option 2, and ask for a nurse.    2. You will be unable to drive after your procedure; please arrange to have someone drive you home the day of your procedure. You will also need to make sure that there is a responsible adult with you for 24 hours after your procedure. Your procedure will be cancelled if you do not have transportation home or someone to stay with you for 24 hrs.     3. Your procedure will be done at [facility]. Please park in the  Skyway Ramp  on the west side of [facility] on [address]. Take the skyway over [facility] to the hospital. Please check in on the first floor, \"Skyway Welcome Desk\" which is one floor down from the skyway level.     4. If you have any questions about your upcoming procedure, please contact the nurse at [facility] at [phone] or the nurse at [facility] at [phone], option#2.  02/20/2013 S17      Please call my nurse at [phone] with any questions or concerns.    Scheduling phone number: [phone]  Reminder: Please bring in all current medications, over the counter supplements and vitamin bottles to your next appointment.        "

## 2020-03-06 NOTE — LETTER
3/6/2020    Ida Lyles MD  [address] [facility] [address]    RE: Debby Chan       Dear Colleague,    I had the pleasure of seeing Debby Chan in the [facility].    Cardiology Clinic Progress Note  Debby Chan MRN# [id number]   YOB: 1950 Age: 69 year old     Reason For Visit:  H&P for angiogram    Primary Cardiologist:   Dr. Martin          History of Presenting Illness:      Debby Chan is a pleasant 69 year old patient who carries a past medical history significant for reflux, family history of premature coronary disease, and hyperlipidemia.     She was last seen on 3/2/20 for evaluation of atypical chest pain. Stress echo demonstrated no evidence of stress - induced ischemia, normal EF 65-70%, and no significant valvular disease. She subsequently underwent a CT angiogram that showed a total calcium score of 10.81 followed by coronary angiography that demonstrated severe mid LAD stenosis and moderate proximal ramus disease. Trivial disease noted in the circumflex and RCA. Results were reviewed by Dr. Martin who recommends coronary catheterization. She presents to the office today for H&P.      She continues to report mid sternal burning pain unrelieved by PPI,  Fatigue, and occasional headache.  She denies shortness of breath, PND, orthopnea, presyncope, syncope, edema, heart racing, or palpitations.     Blood pressure is well controlled at 128/75, HR 85  Last BMP showed a sodium of 137, potassium 4.0, BUN 20, Creatinine 0.81, and GFR 74  Hgb 15.7, and Plt 247  BMI 25.32    Activity level is unchanged   Follows a heart healthy diet.  Remains compliant with all medications.                   Assessment and Plan:       1. Abnormal CT coronary angiogram -  Total calcium score of 10.81 followed by CT coronary angiography that demonstrated severe mid LAD stenosis and moderate proximal ramus disease.  Trivial disease noted in the circumflex and RCA. Recommended for coronary catheterization. Risks and benefits of coronary angiogram discussed today including, bleeding, bruising, infection, allergic reaction, kidney damage (including need for dialysis), stroke, heart attack, vascular damage, emergency open heart surgery, up to and including death.  Patient indicates understanding and is agreeable to proceed. Started low dose Toprol 25mg daily. Instructed to take aspirin 325mg the evening prior and morning of angiogram. NPO after midnight the evening prior to angiogram. Ok to take medications the morning of procedure with a few sips of water. Instructed patient to seek ER evaluation if symptoms become severe.     2. Reflux - Continue on current medical therapy.   3. Hyperlipidemia - LDL 75, on low dose atorvastatin.                     Thank you for allowing me to participate in this delightful patient's care.        KADEEM Ambriz CNP         Review of Systems:     Review of Systems:  Skin:  Negative     Eyes:  Negative    ENT:  Negative    Respiratory:  Positive for dyspnea on exertion;shortness of breath;dyspnea at rest  Cardiovascular:    Positive for;chest pain;lightheadedness  Gastroenterology: Positive for heartburn;reflux  Genitourinary:  not assessed    Musculoskeletal:  Positive for joint pain  Neurologic:  Positive for headaches(occasional sharp pain in temple area)  Psychiatric:  Negative    Heme/Lymph/Imm:  Negative    Endocrine:  Negative                Physical Exam:     GEN:  In general, this is a well nourished female in no acute distress.  HEENT:  Pupils equal, round. Sclerae nonicteric. Clear oropharynx. Mucous membranes moist.  NECK: Supple, no masses appreciated. Trachea midline. No JVD   C/V:  Regular rate and rhythm, No murmur, rub or gallop. No S3 or RV heave.   RESP: Respirations are unlabored. No use of accessory muscles. Clear to auscultation bilaterally without wheezing, rales, or  rhonchi.  GI: Abdomen soft, nontender, nondistended. No HSM appreciated.   EXTREM: No LE edema. No cyanosis or clubbing.  NEURO: Alert and oriented, cooperative. No obvious focal deficits.   PSYCH: Normal affect.  SKIN: Warm and dry. No rashes or petechiae appreciated.          Past Medical History:     Past Medical History:   Diagnosis Date     Abnormal stress test 3/5/2020    Added automatically from request for surgery [id number]     Acid reflux disease      Chest pain      HERNANDEZ (dyspnea on exertion)      Family history of early CAD      GERD with esophagitis      Impaired fasting glucose      Pure hypercholesterolemia               Past Surgical History:     Past Surgical History:   Procedure Laterality Date     BREAST BIOPSY, RT/LT Right 1985    benign fibroadenoma     ESOPHAGOSCOPY, GASTROSCOPY, DUODENOSCOPY (EGD), COMBINED N/A 12/21/2017    Procedure: COMBINED ESOPHAGOSCOPY, GASTROSCOPY, DUODENOSCOPY (EGD), BIOPSY SINGLE OR MULTIPLE;  gastroscopy;  Surgeon: Srinivasa Jackson MD;  Location:  GI     TUBAL LIGATION  1990              Allergies:   Penicillins       Data:   All laboratory data reviewed:    LAST CHOLESTEROL:  Lab Results   Component Value Date    CHOL 156 11/05/2019     Lab Results   Component Value Date    HDL 57 11/05/2019     Lab Results   Component Value Date    LDL 75 11/05/2019     Lab Results   Component Value Date    TRIG 119 11/05/2019     Lab Results   Component Value Date    CHOLHDLRATIO 2.6 08/07/2015       LAST BMP:  Lab Results   Component Value Date     02/19/2020      Lab Results   Component Value Date    POTASSIUM 4.0 02/19/2020     Lab Results   Component Value Date    CHLORIDE 106 02/19/2020     Lab Results   Component Value Date    CARMINA 9.5 02/19/2020     Lab Results   Component Value Date    CO2 28 02/19/2020     Lab Results   Component Value Date    BUN 20 02/19/2020     Lab Results   Component Value Date    CR 0.81 02/19/2020     Lab Results   Component Value Date    GLC 99  02/19/2020       LAST CBC:  Lab Results   Component Value Date    WBC 8.3 02/19/2020     Lab Results   Component Value Date    RBC 5.13 02/19/2020     Lab Results   Component Value Date    HGB 15.7 02/19/2020     Lab Results   Component Value Date    HCT 47.4 02/19/2020     Lab Results   Component Value Date    MCV 92 02/19/2020     Lab Results   Component Value Date    MCH 30.6 02/19/2020     Lab Results   Component Value Date    MCHC 33.1 02/19/2020     Lab Results   Component Value Date    RDW 13.9 02/19/2020     Lab Results   Component Value Date     02/19/2020         Thank you for allowing me to participate in the care of your patient.    Sincerely,     KADEEM Ambriz [facility]

## 2020-03-09 DIAGNOSIS — R93.1 ABNORMAL CARDIAC CT ANGIOGRAPHY: Primary | ICD-10-CM

## 2020-03-09 RX ORDER — LIDOCAINE 40 MG/G
CREAM TOPICAL
Status: CANCELLED | OUTPATIENT
Start: 2020-03-09

## 2020-03-09 RX ORDER — SODIUM CHLORIDE 9 MG/ML
INJECTION, SOLUTION INTRAVENOUS CONTINUOUS
Status: CANCELLED | OUTPATIENT
Start: 2020-03-09

## 2020-03-09 RX ORDER — POTASSIUM CHLORIDE 1500 MG/1
20 TABLET, EXTENDED RELEASE ORAL
Status: CANCELLED | OUTPATIENT
Start: 2020-03-09

## 2020-03-10 ENCOUNTER — HOSPITAL ENCOUNTER (OUTPATIENT)
Facility: CLINIC | Age: 70
Discharge: HOME | End: 2020-03-10
Attending: INTERNAL MEDICINE | Admitting: INTERNAL MEDICINE
Payer: COMMERCIAL

## 2020-03-10 VITALS
BODY MASS INDEX: 25.59 KG/M2 | DIASTOLIC BLOOD PRESSURE: 68 MMHG | TEMPERATURE: 97.8 F | HEART RATE: 69 BPM | SYSTOLIC BLOOD PRESSURE: 134 MMHG | OXYGEN SATURATION: 96 % | HEIGHT: 65 IN | WEIGHT: 153.6 LBS | RESPIRATION RATE: 16 BRPM

## 2020-03-10 DIAGNOSIS — R93.1 ABNORMAL CARDIAC CT ANGIOGRAPHY: ICD-10-CM

## 2020-03-10 DIAGNOSIS — I25.10 ATHEROSCLEROSIS OF NATIVE CORONARY ARTERY OF NATIVE HEART WITHOUT ANGINA PECTORIS: Primary | ICD-10-CM

## 2020-03-10 DIAGNOSIS — R94.39 ABNORMAL STRESS TEST: ICD-10-CM

## 2020-03-10 PROBLEM — Z98.890 STATUS POST CORONARY ANGIOGRAM: Status: ACTIVE | Noted: 2020-03-10

## 2020-03-10 LAB
ANION GAP SERPL CALCULATED.3IONS-SCNC: 7 MMOL/L (ref 3–14)
APTT PPP: 27 SEC (ref 22–37)
BUN SERPL-MCNC: 17 MG/DL (ref 7–30)
CALCIUM SERPL-MCNC: 9.2 MG/DL (ref 8.5–10.1)
CATH EF QUANTITATIVE: 65 %
CHLORIDE SERPL-SCNC: 112 MMOL/L (ref 94–109)
CO2 SERPL-SCNC: 19 MMOL/L (ref 20–32)
CREAT SERPL-MCNC: 0.83 MG/DL (ref 0.52–1.04)
ERYTHROCYTE [DISTWIDTH] IN BLOOD BY AUTOMATED COUNT: 13.6 % (ref 10–15)
GFR SERPL CREATININE-BSD FRML MDRD: 72 ML/MIN/{1.73_M2}
GLUCOSE SERPL-MCNC: 91 MG/DL (ref 70–99)
HCT VFR BLD AUTO: 45.4 % (ref 35–47)
HGB BLD-MCNC: 15 G/DL (ref 11.7–15.7)
INR PPP: 1.1 (ref 0.86–1.14)
MCH RBC QN AUTO: 30.6 PG (ref 26.5–33)
MCHC RBC AUTO-ENTMCNC: 33 G/DL (ref 31.5–36.5)
MCV RBC AUTO: 93 FL (ref 78–100)
PLATELET # BLD AUTO: 232 10E9/L (ref 150–450)
POTASSIUM SERPL-SCNC: 4.1 MMOL/L (ref 3.4–5.3)
RBC # BLD AUTO: 4.9 10E12/L (ref 3.8–5.2)
SODIUM SERPL-SCNC: 138 MMOL/L (ref 133–144)
WBC # BLD AUTO: 6.8 10E9/L (ref 4–11)

## 2020-03-10 PROCEDURE — 85610 PROTHROMBIN TIME: CPT | Performed by: INTERNAL MEDICINE

## 2020-03-10 PROCEDURE — 25800030 ZZH RX IP 258 OP 636: Performed by: INTERNAL MEDICINE

## 2020-03-10 PROCEDURE — 40000235 ZZH STATISTIC TELEMETRY

## 2020-03-10 PROCEDURE — 93010 ELECTROCARDIOGRAM REPORT: CPT | Performed by: INTERNAL MEDICINE

## 2020-03-10 PROCEDURE — 93458 L HRT ARTERY/VENTRICLE ANGIO: CPT | Performed by: INTERNAL MEDICINE

## 2020-03-10 PROCEDURE — 36415 COLL VENOUS BLD VENIPUNCTURE: CPT

## 2020-03-10 PROCEDURE — 85027 COMPLETE CBC AUTOMATED: CPT | Performed by: INTERNAL MEDICINE

## 2020-03-10 PROCEDURE — 40000065 ZZH STATISTIC EKG NON-CHARGEABLE

## 2020-03-10 PROCEDURE — 25000125 ZZHC RX 250: Performed by: INTERNAL MEDICINE

## 2020-03-10 PROCEDURE — 36415 COLL VENOUS BLD VENIPUNCTURE: CPT | Performed by: INTERNAL MEDICINE

## 2020-03-10 PROCEDURE — C1894 INTRO/SHEATH, NON-LASER: HCPCS | Performed by: INTERNAL MEDICINE

## 2020-03-10 PROCEDURE — 85730 THROMBOPLASTIN TIME PARTIAL: CPT | Performed by: INTERNAL MEDICINE

## 2020-03-10 PROCEDURE — 27210794 ZZH OR GENERAL SUPPLY STERILE: Performed by: INTERNAL MEDICINE

## 2020-03-10 PROCEDURE — 93571 IV DOP VEL&/PRESS C FLO 1ST: CPT | Mod: LD | Performed by: INTERNAL MEDICINE

## 2020-03-10 PROCEDURE — C1769 GUIDE WIRE: HCPCS | Performed by: INTERNAL MEDICINE

## 2020-03-10 PROCEDURE — 99153 MOD SED SAME PHYS/QHP EA: CPT | Performed by: INTERNAL MEDICINE

## 2020-03-10 PROCEDURE — 25000132 ZZH RX MED GY IP 250 OP 250 PS 637: Performed by: INTERNAL MEDICINE

## 2020-03-10 PROCEDURE — 99152 MOD SED SAME PHYS/QHP 5/>YRS: CPT | Performed by: INTERNAL MEDICINE

## 2020-03-10 PROCEDURE — 40000852 ZZH STATISTIC HEART CATH LAB OR EP LAB

## 2020-03-10 PROCEDURE — 80048 BASIC METABOLIC PNL TOTAL CA: CPT | Performed by: INTERNAL MEDICINE

## 2020-03-10 PROCEDURE — 93005 ELECTROCARDIOGRAM TRACING: CPT

## 2020-03-10 PROCEDURE — 25000128 H RX IP 250 OP 636: Performed by: INTERNAL MEDICINE

## 2020-03-10 RX ORDER — FLUMAZENIL 0.1 MG/ML
0.2 INJECTION, SOLUTION INTRAVENOUS
Status: DISCONTINUED | OUTPATIENT
Start: 2020-03-10 | End: 2020-03-10 | Stop reason: HOSPADM

## 2020-03-10 RX ORDER — NALOXONE HYDROCHLORIDE 0.4 MG/ML
.2-.4 INJECTION, SOLUTION INTRAMUSCULAR; INTRAVENOUS; SUBCUTANEOUS
Status: DISCONTINUED | OUTPATIENT
Start: 2020-03-10 | End: 2020-03-10 | Stop reason: HOSPADM

## 2020-03-10 RX ORDER — NALOXONE HYDROCHLORIDE 0.4 MG/ML
.1-.4 INJECTION, SOLUTION INTRAMUSCULAR; INTRAVENOUS; SUBCUTANEOUS
Status: DISCONTINUED | OUTPATIENT
Start: 2020-03-10 | End: 2020-03-10 | Stop reason: HOSPADM

## 2020-03-10 RX ORDER — HEPARIN SODIUM 1000 [USP'U]/ML
INJECTION, SOLUTION INTRAVENOUS; SUBCUTANEOUS
Status: DISCONTINUED | OUTPATIENT
Start: 2020-03-10 | End: 2020-03-10 | Stop reason: HOSPADM

## 2020-03-10 RX ORDER — ATROPINE SULFATE 0.1 MG/ML
0.5 INJECTION INTRAVENOUS EVERY 5 MIN PRN
Status: DISCONTINUED | OUTPATIENT
Start: 2020-03-10 | End: 2020-03-10 | Stop reason: HOSPADM

## 2020-03-10 RX ORDER — POTASSIUM CHLORIDE 1500 MG/1
20 TABLET, EXTENDED RELEASE ORAL
Status: DISCONTINUED | OUTPATIENT
Start: 2020-03-10 | End: 2020-03-10 | Stop reason: HOSPADM

## 2020-03-10 RX ORDER — SODIUM CHLORIDE 9 MG/ML
INJECTION, SOLUTION INTRAVENOUS CONTINUOUS
Status: DISCONTINUED | OUTPATIENT
Start: 2020-03-10 | End: 2020-03-10 | Stop reason: HOSPADM

## 2020-03-10 RX ORDER — NITROGLYCERIN 5 MG/ML
VIAL (ML) INTRAVENOUS
Status: DISCONTINUED | OUTPATIENT
Start: 2020-03-10 | End: 2020-03-10 | Stop reason: HOSPADM

## 2020-03-10 RX ORDER — ATORVASTATIN CALCIUM 20 MG/1
20 TABLET, FILM COATED ORAL DAILY
Status: CANCELLED | OUTPATIENT
Start: 2020-03-10

## 2020-03-10 RX ORDER — VERAPAMIL HYDROCHLORIDE 2.5 MG/ML
INJECTION, SOLUTION INTRAVENOUS
Status: DISCONTINUED | OUTPATIENT
Start: 2020-03-10 | End: 2020-03-10 | Stop reason: HOSPADM

## 2020-03-10 RX ORDER — ACETAMINOPHEN 325 MG/1
650 TABLET ORAL EVERY 4 HOURS PRN
Status: DISCONTINUED | OUTPATIENT
Start: 2020-03-10 | End: 2020-03-10 | Stop reason: HOSPADM

## 2020-03-10 RX ORDER — FENTANYL CITRATE 50 UG/ML
25-50 INJECTION, SOLUTION INTRAMUSCULAR; INTRAVENOUS
Status: DISCONTINUED | OUTPATIENT
Start: 2020-03-10 | End: 2020-03-10 | Stop reason: HOSPADM

## 2020-03-10 RX ORDER — LIDOCAINE 40 MG/G
CREAM TOPICAL
Status: DISCONTINUED | OUTPATIENT
Start: 2020-03-10 | End: 2020-03-10 | Stop reason: HOSPADM

## 2020-03-10 RX ORDER — FENTANYL CITRATE 50 UG/ML
INJECTION, SOLUTION INTRAMUSCULAR; INTRAVENOUS
Status: DISCONTINUED | OUTPATIENT
Start: 2020-03-10 | End: 2020-03-10 | Stop reason: HOSPADM

## 2020-03-10 RX ORDER — METOPROLOL SUCCINATE 25 MG/1
25 TABLET, EXTENDED RELEASE ORAL DAILY
Status: CANCELLED | OUTPATIENT
Start: 2020-03-10

## 2020-03-10 RX ADMIN — SODIUM CHLORIDE: 9 INJECTION, SOLUTION INTRAVENOUS at 11:25

## 2020-03-10 RX ADMIN — ACETAMINOPHEN 650 MG: 325 TABLET, FILM COATED ORAL at 17:58

## 2020-03-10 ASSESSMENT — MIFFLIN-ST. JEOR: SCORE: 1214.67

## 2020-03-10 NOTE — DISCHARGE INSTRUCTIONS
Cardiac Angiogram Discharge Instructions - Radial    After you go home:      Have an adult stay with you until tomorrow.    Drink extra fluids for 2 days.    You may resume your normal diet.    No smoking       For 24 hours - due to the sedation you received:    Relax and take it easy.    Do NOT make any important or legal decisions.    Do NOT drive or operate machines at home or at work.    Do NOT drink alcohol.    Care of Wrist Puncture Site:      For the first 24 hrs - check the puncture site every 1-2 hours while awake.    It is normal to have soreness at the puncture site and mild tingling in your hand for up to 3 days.    Remove the bandaid after 24 hours. If there is minor oozing, apply another bandaid and remove it after 12 hours.    You may shower tomorrow.  Do NOT take a bath, or use a hot tub or pool for at least 3 days. Do NOT scrub the site. Do not use lotion or powder near the puncture site.           Activity:        For 2 days:     do not use your hand or arm to support your weight (such as rising from a chair)     do not bend your wrist (such as lifting a garage door).    do not lift more than 5 pounds or exercise your arm (such as tennis, golf or bowling).    Do NOT do any heavy activity such as exercise, lifting, or straining.     Bleeding:      If you start bleeding from the site in your wrist, sit down and press firmly on/above the site for 10 minutes.     Once bleeding stops, keep arm still for 2 hours.     Call [facility] as soon as you can.       Call 911 right away if you have heavy bleeding or bleeding that does not stop.      Medicines:      Take your medications, including blood thinners, unless your provider tells you not to.      Follow Up Appointments:      Follow up with [facility] Nurse Practitioner at [facility] of patient preference in 7-10 days.    Call the clinic if:      You have a large or growing hard lump around the site.    The site is red, swollen, hot or  tender.    Blood or fluid is draining from the site.    You have chills or a fever greater than 101 F (38 C).    Your arm feels numb, cool or changes color.    You have hives, a rash or unusual itching.    Any questions or concerns.          [facility] [place] at [place]:    [phone] UM (7 days a week)

## 2020-03-10 NOTE — PROGRESS NOTES
Care Suites Post Procedure Note    Patient Information  Name: Debby Chan  Age: 69 year old    Post Procedure  Procedure: Left heart cath  Time patient returned to Care Suites: 15:15  Concerns/abnormal assessment: None  If abnormal assessment, provider notified: N/A  Plan/Other: Monitor patient.  Release air from TR band per protocol.  Review discharge instructions with patient and .  Discharge at approximately 18:00.

## 2020-03-10 NOTE — PROVIDER NOTIFICATION
Notified Dr. Huffman via page that patient is complaining of numbness in hand and (L) arm discomfort. Other than complaints assessment is normal.  1735 Patient seen at bedside by Dr. Huffman. No new orders. Continue to observe and remove TR band when able.  1740 Patient reports that pain has improved following repositioning and removal of arm board.  Declines tylenol at this time.

## 2020-03-10 NOTE — PRE-PROCEDURE
GENERAL PRE-PROCEDURE:   Procedure:  Left heart catheterization, left ventriculogram, coronary angiogram and possible intervention.  Date/Time:  3/10/2020 1:27 PM    Written consent obtained?: Yes    Risks and benefits: Risks, benefits and alternatives were discussed    Consent given by:  Patient  Patient states understanding of procedure being performed: Yes    Patient's understanding of procedure matches consent: Yes    Procedure consent matches procedure scheduled: Yes    Expected level of sedation:  Moderate  Appropriately NPO:  Yes  ASA Class:  Class 1- healthy patient  Mallampati  :  Grade 2- soft palate, base of uvula, tonsillar pillars, and portion of posterior pharyngeal wall visible  Lungs:  Lungs clear with good breath sounds bilaterally  Heart:  Normal heart sounds and rate  History & Physical reviewed:  History and physical reviewed and no updates needed  Statement of review:  I have reviewed the lab findings, diagnostic data, medications, and the plan for sedation

## 2020-03-10 NOTE — PROGRESS NOTES
Care Suites Admission Nursing Note    Patient Information  Name: Debby Chan  Age: 69 year old  Reason for admission: Left heart cath  Care Suites arrival time: 12:15    Patient Admission/Assessment   Pre-procedure assessment complete: Yes  If abnormal assessment/labs, provider notified: N/A  NPO: Yes  Medications held per instructions/orders: Yes  Consent: will do with MD  If applicable, pregnancy test status: N/A  Patient oriented to room: Yes  Education/questions answered: Yes  Plan/other: Left heart cath at 13:00    Discharge Planning  Accompanied by: Spouse Adalberto  Discharge name/phone number: andy [phone]  Overnight post sedation caregiver: Adalberto  Discharge location: home

## 2020-03-11 ENCOUNTER — TELEPHONE (OUTPATIENT)
Dept: CARDIOLOGY | Facility: CLINIC | Age: 70
End: 2020-03-11

## 2020-03-11 LAB — INTERPRETATION ECG - MUSE: NORMAL

## 2020-03-11 NOTE — PROGRESS NOTES
Care Suites Discharge Nursing Note    Patient Information  Name: Debby Chan  Age: 69 year old    Discharge Education:  Discharge instructions reviewed: AVS reviewed by Shira GRIFFIN.  Patient/patient representative verbalizes understanding: spouse and patient verbalized understanding.    Discharge Plans:   Discharge location: care suites to home  Discharge ride contacted:  at bedside  Approximate discharge time: 1900    Discharge Criteria:  Discharge criteria met and vital signs stable: yes, ambulated, voided, ate meal.  Mild residual numbness felt earlier in (L) hand improving.    Patient Belongs:  Patient belongings returned to patient: yes  IV and monitor dc'd.    Isela Mejia RN

## 2020-03-11 NOTE — TELEPHONE ENCOUNTER
Reviewed coronary angiogram showing     Left ventricular filling pressures are severely elevated .    The ejection fraction is calculated to be 65%.    Mid RCA lesion is 25% stenosed.    Ost LM to Dist LM lesion is 25% stenosed.    Mid LAD lesion is 60% stenosed.     1.  Left main coronary artery appears to be small.  I suspect there is diffusely diseased in the 30% range.  2.  60% smooth mid LAD stenosis.  IFR across the stenosis is 0.90.   FFR from the aorta to the distal left anterior descending artery thus including both the left main and the mid left anterior descending artery is 0.86.  3.  25% mid RCA stenosis.  4.  LVEDP of 27.  Ejection fraction of 65% without focal wall motion abnormalities.  Mild mitral regurgitation.  No aortic stenosis upon pullback.    Pt has office visit w/ Jeanne Russ NP 3/24/20; will message Dr. Martin to review. Eliseo GRIFFIN

## 2020-03-20 DIAGNOSIS — R94.39 ABNORMAL STRESS TEST: Primary | ICD-10-CM

## 2020-03-20 RX ORDER — METOPROLOL SUCCINATE 25 MG/1
25 TABLET, EXTENDED RELEASE ORAL DAILY
Qty: 90 TABLET | Refills: 3 | Status: SHIPPED | OUTPATIENT
Start: 2020-03-20 | End: 2020-08-13

## 2020-03-24 ENCOUNTER — VIRTUAL VISIT (OUTPATIENT)
Dept: CARDIOLOGY | Facility: CLINIC | Age: 70
End: 2020-03-24
Payer: COMMERCIAL

## 2020-03-24 DIAGNOSIS — E78.5 HYPERLIPIDEMIA LDL GOAL <70: ICD-10-CM

## 2020-03-24 DIAGNOSIS — K21.9 GASTROESOPHAGEAL REFLUX DISEASE, ESOPHAGITIS PRESENCE NOT SPECIFIED: ICD-10-CM

## 2020-03-24 DIAGNOSIS — I25.10 CORONARY ARTERY DISEASE INVOLVING NATIVE CORONARY ARTERY OF NATIVE HEART WITHOUT ANGINA PECTORIS: Primary | ICD-10-CM

## 2020-03-24 PROCEDURE — 99213 OFFICE O/P EST LOW 20 MIN: CPT | Mod: TEL | Performed by: NURSE PRACTITIONER

## 2020-03-24 RX ORDER — ASPIRIN 81 MG/1
81 TABLET ORAL DAILY
COMMUNITY
Start: 2020-03-24

## 2020-03-24 RX ORDER — ATORVASTATIN CALCIUM 40 MG/1
40 TABLET, FILM COATED ORAL DAILY
Qty: 90 TABLET | Refills: 3 | Status: SHIPPED | OUTPATIENT
Start: 2020-03-24 | End: 2020-08-13

## 2020-03-24 NOTE — PROGRESS NOTES
"Debby Chan is a 69 year old female who is being evaluated via a billable telephone visit.      The patient has been notified of following:     \"This telephone visit will be conducted via a call between you and your physician/provider. We have found that certain health care needs can be provided without the need for a physical exam.  This service lets us provide the care you need with a short phone conversation.  If a prescription is necessary we can send it directly to your pharmacy.  If lab work is needed we can place an order for that and you can then stop by our lab to have the test done at a later time.    If during the course of the call the physician/provider feels a telephone visit is not appropriate, you will not be charged for this service.\"     Debby Chan complains of  - None    I have reviewed and updated the patient's Past Medical History, Social History, Family History and Medication List.    ALLERGIES  Penicillins       HPI  Debby Chan is a pleasant 69 year old patient who carries a past medical history significant for reflux, family history of premature coronary disease, hyperlipidemia and moderate CAD.     She recently underwent a abnormal CT angiogram followed by a coronary angiogram that demonstrated moderate coronary disease with a 60% stenosis in the mid LAD, 25% ostial to distal left main, and 25% mid RCA.  Ejection fraction is normal at 65 to 70%, no wall motion abnormalities, and no significant valvular disease.     Today she is feeling well on a cardiac standpoint, denies chest pain, shortness of breath, palpitations, PND, orthopnea, presyncope, syncope, or lower extremity edema.  Her radial access site has healed well.  Her only complaint is occasional episodes of \"vibration\" felt in the chest radiating down to the knee.      Blood pressures are well controlled.   Last BMP showed a sodium of 138, potassium 4.1, BUN 17, creatinine 0.83, GFR " 72  Hemoglobin 15.0 and platelet 232.   She has not been taking low-dose aspirin due to a history of significant reflux.    Last lipid panel showed a total cholesterol of 156, HDL 57, LDL 75, triglycerides 119  BMI 25.32    She has made significant changes to her diet and initiated a new exercise program.      Assessment/Plan:  1.  Moderate coronary artery disease-  coronary angiogram demonstrated moderate coronary disease with a 60% stenosis in the mid LAD, 25% ostial to distal left main, and 25% distal RCA.  EF normal at 65 to 70%.  Continue with metoprolol. Encourage low-dose enteric-coated aspirin, monitor for worsening reflux.   Continue with risk modification, exercise, weight loss, and heart healthy diet.     2. Reflux - Continue on current medical therapy.   3. Hyperlipidemia - LDL 75, not at goal.  Increase atorvastatin to 40 mg daily with follow-up fasting lipid panel in 8 weeks.  Encourage Mediterranean diet.      I have recommended patient follow-up in 8 weeks for fasting lipid panel.  Follow-up with ROBER in 6 months or sooner if needed.        Phone call duration:  15 minutes    KADEEM Ambriz CNP

## 2020-03-24 NOTE — PATIENT INSTRUCTIONS
Thanks for talking with the [facility] today.    We reviewed results of coronary angiogram - moderate coronary disease.  Recommend increasing Lipitor to 40mg daily.  Follow up cholesterol level in 8 weeks  Encourage trying low dose EC aspirin, monitor for worsening reflux.   Continue on metoprolol    Follow up with ROBER in 6 months or sooner if needed.     Please call my nurse at [phone] with any questions or concerns.    Scheduling phone number: [phone]  Reminder: Please bring in all current medications, over the counter supplements and vitamin bottles to your next appointment.

## 2020-05-26 ENCOUNTER — TELEPHONE (OUTPATIENT)
Dept: LAB | Facility: CLINIC | Age: 70
End: 2020-05-26

## 2020-05-26 NOTE — TELEPHONE ENCOUNTER
Wellness Screening Tool    Symptom Screening:    Do you have one of the following new symptoms:      Fever or reported chills?   No    A new cough (started within the past 14 days)?  No    Shortness of breath (started within the past 14 days)?No    Nausea, vomiting or diarrhea?No    Within the past 3 weeks, have you been exposed to someone with a known positive illness below?      COVID - 19 (known or suspected)  no    Chicken pox?   no    Measles?  no    Pertussis? * No          Patient notified of visitor restriction: Yes   Patient informed to wear a mask: YES     Patient's appointment status: Patient will be seen in clinic as scheduled on 05/27/20

## 2020-05-27 DIAGNOSIS — I25.10 CORONARY ARTERY DISEASE INVOLVING NATIVE CORONARY ARTERY OF NATIVE HEART WITHOUT ANGINA PECTORIS: ICD-10-CM

## 2020-05-27 LAB
CHOLEST SERPL-MCNC: 158 MG/DL
HDLC SERPL-MCNC: 66 MG/DL
LDLC SERPL CALC-MCNC: 64 MG/DL
NONHDLC SERPL-MCNC: 92 MG/DL
TRIGL SERPL-MCNC: 142 MG/DL

## 2020-05-27 PROCEDURE — 36415 COLL VENOUS BLD VENIPUNCTURE: CPT | Performed by: INTERNAL MEDICINE

## 2020-05-27 PROCEDURE — 80061 LIPID PANEL: CPT | Performed by: INTERNAL MEDICINE

## 2020-11-04 ENCOUNTER — MYC MEDICAL ADVICE (OUTPATIENT)
Dept: INTERNAL MEDICINE | Facility: CLINIC | Age: 70
End: 2020-11-04

## 2020-11-12 PROBLEM — R93.1 ABNORMAL CARDIAC CT ANGIOGRAPHY: Status: RESOLVED | Noted: 2020-03-06 | Resolved: 2020-11-12

## 2020-11-12 PROBLEM — R94.39 ABNORMAL STRESS TEST: Status: RESOLVED | Noted: 2020-03-05 | Resolved: 2020-11-12

## 2020-11-12 PROBLEM — Z98.890 STATUS POST CORONARY ANGIOGRAM: Status: RESOLVED | Noted: 2020-03-10 | Resolved: 2020-11-12

## 2020-11-13 ENCOUNTER — OFFICE VISIT (OUTPATIENT)
Dept: INTERNAL MEDICINE | Facility: CLINIC | Age: 70
End: 2020-11-13
Payer: MEDICARE

## 2020-11-13 VITALS
DIASTOLIC BLOOD PRESSURE: 70 MMHG | RESPIRATION RATE: 16 BRPM | TEMPERATURE: 97.2 F | BODY MASS INDEX: 26.64 KG/M2 | OXYGEN SATURATION: 98 % | HEART RATE: 76 BPM | WEIGHT: 159.9 LBS | HEIGHT: 65 IN | SYSTOLIC BLOOD PRESSURE: 106 MMHG

## 2020-11-13 DIAGNOSIS — Z00.00 MEDICARE ANNUAL WELLNESS VISIT, SUBSEQUENT: Primary | ICD-10-CM

## 2020-11-13 DIAGNOSIS — Z12.11 SPECIAL SCREENING FOR MALIGNANT NEOPLASMS, COLON: ICD-10-CM

## 2020-11-13 DIAGNOSIS — Z12.31 ENCOUNTER FOR SCREENING MAMMOGRAM FOR BREAST CANCER: ICD-10-CM

## 2020-11-13 PROCEDURE — 99397 PER PM REEVAL EST PAT 65+ YR: CPT | Performed by: INTERNAL MEDICINE

## 2020-11-13 SDOH — ECONOMIC STABILITY: FOOD INSECURITY: WITHIN THE PAST 12 MONTHS, YOU WORRIED THAT YOUR FOOD WOULD RUN OUT BEFORE YOU GOT MONEY TO BUY MORE.: NOT ASKED

## 2020-11-13 SDOH — ECONOMIC STABILITY: TRANSPORTATION INSECURITY
IN THE PAST 12 MONTHS, HAS THE LACK OF TRANSPORTATION KEPT YOU FROM MEDICAL APPOINTMENTS OR FROM GETTING MEDICATIONS?: NOT ASKED

## 2020-11-13 SDOH — ECONOMIC STABILITY: TRANSPORTATION INSECURITY
IN THE PAST 12 MONTHS, HAS LACK OF TRANSPORTATION KEPT YOU FROM MEETINGS, WORK, OR FROM GETTING THINGS NEEDED FOR DAILY LIVING?: NOT ASKED

## 2020-11-13 SDOH — HEALTH STABILITY: MENTAL HEALTH: HOW OFTEN DO YOU HAVE A DRINK CONTAINING ALCOHOL?: 2-4 TIMES A MONTH

## 2020-11-13 SDOH — ECONOMIC STABILITY: INCOME INSECURITY: HOW HARD IS IT FOR YOU TO PAY FOR THE VERY BASICS LIKE FOOD, HOUSING, MEDICAL CARE, AND HEATING?: NOT ASKED

## 2020-11-13 SDOH — ECONOMIC STABILITY: FOOD INSECURITY: WITHIN THE PAST 12 MONTHS, THE FOOD YOU BOUGHT JUST DIDN'T LAST AND YOU DIDN'T HAVE MONEY TO GET MORE.: NOT ASKED

## 2020-11-13 ASSESSMENT — MIFFLIN-ST. JEOR: SCORE: 1238.24

## 2020-11-13 NOTE — PROGRESS NOTES
SUBJECTIVE                                                      HPI: Debby Chan is a very pleasant 70 year old female who presents for her AWV:    PMH, PSH, FH, SH, medications, allergies, immunizations, preventative health, and health risk assessment reviewed.     Past Medical History:   Diagnosis Date     Acid reflux disease      CAD (coronary artery disease)      Impaired fasting glucose      Pure hypercholesterolemia      Past Surgical History:   Procedure Laterality Date     BREAST BIOPSY, RT/LT Right 1985    benign fibroadenoma     CV CORONARY ANGIOGRAM N/A 3/10/2020    Procedure: Coronary Angiogram;  Surgeon: Daniel Huffman MD;  Location:  HEART CARDIAC CATH LAB     CV FRACTIONAL FLOW RESERVE N/A 3/10/2020    Procedure: Fractional Flow Reserve;  Surgeon: Daniel Huffman MD;  Location:  HEART CARDIAC CATH LAB     CV INSTANTANEOUS WAVE-FREE RATIO N/A 3/10/2020    Procedure: Instantaneous Wave-Free Ratio;  Surgeon: Daniel Huffman MD;  Location:  HEART CARDIAC CATH LAB     CV LEFT HEART CATH N/A 3/10/2020    Procedure: Left Heart Cath;  Surgeon: Daniel Huffman MD;  Location:  HEART CARDIAC CATH LAB     CV LEFT VENTRICULOGRAM N/A 3/10/2020    Procedure: Left Ventriculogram;  Surgeon: Daniel Huffman MD;  Location:  HEART CARDIAC CATH LAB     ESOPHAGOSCOPY, GASTROSCOPY, DUODENOSCOPY (EGD), COMBINED N/A 12/21/2017    Procedure: COMBINED ESOPHAGOSCOPY, GASTROSCOPY, DUODENOSCOPY (EGD), BIOPSY SINGLE OR MULTIPLE;  gastroscopy;  Surgeon: Srinivasa Jackson MD;  Location:  GI     TUBAL LIGATION  1990     Family History   Problem Relation Age of Onset     Myocardial Infarction Father         later in life; s/p PCI     Prostate Cancer Father      Diabetes Type 2  Father      Skin Cancer Father      Heart Failure Mother      Uterine Cancer Paternal Aunt      Melanoma Sister      Cerebrovascular Disease No family hx of      Coronary Artery Disease Early Onset No family hx  of      Ovarian Cancer No family hx of      Colon Cancer No family hx of      Social History     Occupational History     Occupation: Retired from [employer]   Tobacco Use     Smoking status: Never Smoker     Smokeless tobacco: Never Used   Substance and Sexual Activity     Alcohol use: Yes     Frequency: 2-4 times a month     Drinks per session: 1 or 2     Binge frequency: Never     Comment: occasional     Drug use: No     Sexual activity: Not Currently     Partners: Male   Social History Narrative    .    No kids.    No formal exercise, but stays active.      Allergies   Allergen Reactions     Penicillins Nausea     Current Outpatient Medications   Medication Sig     aspirin 81 MG EC tablet Take 1 tablet (81 mg) by mouth daily     atorvastatin (LIPITOR) 40 MG tablet Take 1 tablet (40 mg) by mouth daily     Calcium Carbonate-Vitamin D (CALCIUM + D PO) Take  by mouth. Plus magnesium     Cholecalciferol (VITAMIN D) 2000 UNITS tablet Take 1 tablet by mouth daily.     metoprolol succinate ER (TOPROL-XL) 25 MG 24 hr tablet Take 1 tablet (25 mg) by mouth daily     omeprazole (PRILOSEC) 40 MG DR capsule TAKE 1 CAPSULE BY MOUTH  DAILY 30-60 MINUTES BEFORE  A MEAL     sucralfate (CARAFATE) 1 GM tablet Take 1 tablet (1 g) by mouth 4 times daily     triamcinolone (KENALOG) 0.025 % cream Apply topically 2 times daily A thin layer to eyelids x 1-2 weeks.     triamcinolone (KENALOG) 0.1 % external ointment Apply sparingly to affected area three times daily for 14 days.     Immunization History   Administered Date(s) Administered     Hep B, Peds or Adolescent 08/19/1993, 10/12/1993, 03/01/1994     Hib (PRP-T) 11/08/1994     Historical DTP/aP 02/14/1995, 08/27/1998     Historical HIB 10/12/1993, 12/13/1993, 03/01/1994     Influenza (High Dose) 3 valent vaccine 10/04/2016, 10/17/2017, 10/23/2018, 11/12/2019, 10/06/2020     Influenza (IIV3) PF 12/03/2010     MMR 11/08/1994, 08/27/1998     OPV, trivalent, live 10/12/1993,  "12/15/1993, 02/14/1995, 08/27/1998     Pneumo Conj 13-V (2010&after) 08/07/2015     Pneumococcal 23 valent 10/04/2016     TDAP Vaccine (Adacel) 05/31/2012     Varicella 10/11/1995     PREVENTATIVE HEALTH                                                      BMI: within normal limits (for age)  Blood pressure: within normal limits   Breast CA screening: DUE - scheduled  Colon CA screening: DUE  Lung CA screening: n/a   Dexa: up to date   Screening HCV: completed  Screening HIV: n/a   Screening diabetes: up to date   Depression screening: PHQ-2 assessment completed and reviewed - no intervention indicated at this time  Alcohol misuse screening: alcohol use reviewed - no intervention indicated at this time  Immunizations: Shingrix series DUE - may obtain in the pharmacy    HEALTH RISK ASSESSMENT                                                      In general, how would you rate your overall physical health? good  Outside of work, how many days during the week do you exercise? 2-3 days/week  Outside of work, approximately how many minutes a day do you exercise? 15-30 minutes/day    If you drink alcohol do you typically have >3 drinks per day or >7 drinks per week? no  Do you usually eat at least 4 servings of fruit and vegetables a day, include whole grains & fiber and avoid regularly eating high fat or \"junk\" foods? no     Do you have any problems taking medications regularly? no  Do you have any side effects from medications? no    Needs assistance with daily activities: no assistance needed    Which of the following safety concerns are present in your home? no concerns identified    Hearing impairment: no hearing concerns    In the past 6 months, have you been bothered by leaking of urine? no    In general, how would you rate your overall mental or emotional health? very good    Additional concerns today: no    OBJECTIVE                                                      /70   Pulse 76   Temp 97.2  F (36.2 " " C) (Temporal)   Resp 16   Ht 1.638 m (5' 4.5\")   Wt 72.5 kg (159 lb 14.4 oz)   SpO2 98%   BMI 27.02 kg/m    Constitutional: well-appearing  Head, Ears, and Eyes: normocephalic; normal external auditory canal and pinna; tympanic membranes visualized and normal; normal lids and conjunctivae  Neck: supple, symmetric, no thyromegaly or lymphadenopathy  Respiratory: normal respiratory effort; clear to auscultation bilaterally  Cardiovascular: regular rate and rhythm; no edema  Gastrointestinal: soft, non-tender, non-distended; no organomegaly or masses   Musculoskeletal: normal gait and station  Psych: normal mood and affect    Cognitive impairment noted: no    ASSESSMENT/PLAN                                                       (Z00.00) Medicare annual wellness visit, subsequent  (primary encounter diagnosis)  Comment: PMH, PSH, FH, SH, medications, allergies, immunizations, and preventative health measures reviewed and updated as appropriate.  Plan: see below for plans.      (Z12.11) Special screening for malignant neoplasms, colon  Plan: screening colonoscopy ordered - patient will be contacted to schedule.      (Z12.31) Encounter for screening mammogram for breast cancer  Plan: screening mammogram ordered - already scheduled.     Ida Lyles MD   [address] [address]  T: [phone], F: [phone]  (Note was completed, in part, with [employer] voice-recognition software. Documentation was reviewed, but some grammatical, spelling, and word errors may remain.)          "

## 2020-11-16 ENCOUNTER — TRANSFERRED RECORDS (OUTPATIENT)
Dept: HEALTH INFORMATION MANAGEMENT | Facility: CLINIC | Age: 70
End: 2020-11-16

## 2020-11-23 ENCOUNTER — OFFICE VISIT (OUTPATIENT)
Dept: CARDIOLOGY | Facility: CLINIC | Age: 70
End: 2020-11-23
Payer: MEDICARE

## 2020-11-23 VITALS
SYSTOLIC BLOOD PRESSURE: 120 MMHG | HEART RATE: 73 BPM | WEIGHT: 157 LBS | HEIGHT: 65 IN | DIASTOLIC BLOOD PRESSURE: 75 MMHG | BODY MASS INDEX: 26.16 KG/M2

## 2020-11-23 DIAGNOSIS — E78.5 HYPERLIPIDEMIA LDL GOAL <70: ICD-10-CM

## 2020-11-23 DIAGNOSIS — I25.10 CORONARY ARTERY DISEASE INVOLVING NATIVE CORONARY ARTERY OF NATIVE HEART WITHOUT ANGINA PECTORIS: Primary | ICD-10-CM

## 2020-11-23 PROCEDURE — 99213 OFFICE O/P EST LOW 20 MIN: CPT | Performed by: NURSE PRACTITIONER

## 2020-11-23 ASSESSMENT — MIFFLIN-ST. JEOR: SCORE: 1229.06

## 2020-11-23 NOTE — LETTER
11/23/2020    Ida Lyles MD  [address] [facility] [address]    RE: Debby Chan       Dear Colleague,    I had the pleasure of seeing Debby Chan in the [facility].    Cardiology Clinic Progress Note  Debby Chan MRN# [id number]   YOB: 1950 Age: 70 year old     Reason For Visit:  Follow up   Primary Cardiologist:   Dr. Moose Grant          History of Presenting Illness:      Debby Chan is a pleasant 70 year old patient who carries a past medical history significant for moderate CAD, reflux, family history of premature coronary disease, and hyperlipidemia. She returns to the office today for routine follow up.      She is feeling well on a cardiac standpoint, denies chest pain, shortness of breath, PND, orthopnea, presyncope, syncope, edema, heart racing, or palpitations.  Her primary complaint is some left-sided thoracic pain, likely muscular skeletal as it worsens with movement.    Her last coronary angiogram on 3/20/2020 demonstrated moderate coronary disease in the mid LAD with negative FFR, and mild disease in the ostial left main and mid RCA.  Echocardiogram showed a normal ejection fraction estimated at 65 to 70%, no wall motion abnormalities, and no significant valvular disease.    Blood pressure is well controlled at 120/75, managed on metoprolol.  Lipid panel showed a total cholesterol of 158, HDL 66, LDL 64, triglycerides 142  BMI 26.33.    Activity consists of walking regularly  Follows a strict heart healthy, low-sodium diet  Remains compliant with all medications.                   Assessment and Plan:       1. CAD - moderate LAD disease with negative FFR, mild left main and RCA disease.  Continue on aspirin, metoprolol, and statin.  Encourage exercise, weight loss, and heart healthy diet    2.  Hyperlipidemia -LDL at goal, continue on statin  3.  Reflux -managed on omeprazole             Thank  you for allowing me to participate in this delightful patient's care. I have recommended she follow up in 1 year with Dr. Martin or sooner if needed .       Jeanne Russ, KADEEM CNP         Review of Systems:     Review of Systems:  Skin:  Negative     Eyes:  Negative    ENT:  Negative    Respiratory:  Positive for dyspnea on exertion  Cardiovascular:       Gastroenterology: Positive for reflux  Genitourinary:  not assessed    Musculoskeletal:  Positive for nocturnal cramping;foot pain  Neurologic:  Negative    Psychiatric:  Negative    Heme/Lymph/Imm:  Negative    Endocrine:  Negative                Physical Exam:     GEN:  In general, this is a well nourished female in no acute distress.  HEENT:  Pupils equal, round. Sclerae nonicteric. Clear oropharynx. Mucous membranes moist.  NECK: Supple, no masses appreciated. Trachea midline. No JVD   C/V:  Regular rate and rhythm, No murmur, rub or gallop. No S3 or RV heave.   RESP: Respirations are unlabored. No use of accessory muscles. Clear to auscultation bilaterally without wheezing, rales, or rhonchi.  GI: Abdomen soft, nontender, nondistended. No HSM appreciated.   EXTREM: No LE edema. No cyanosis or clubbing.  NEURO: Alert and oriented, cooperative. No obvious focal deficits.   PSYCH: Normal affect.  SKIN: Warm and dry. No rashes or petechiae appreciated.          Past Medical History:     Past Medical History:   Diagnosis Date     Acid reflux disease      CAD (coronary artery disease)      Impaired fasting glucose      Pure hypercholesterolemia               Past Surgical History:     Past Surgical History:   Procedure Laterality Date     BREAST BIOPSY, RT/LT Right 1985    benign fibroadenoma     CV CORONARY ANGIOGRAM N/A 3/10/2020    Procedure: Coronary Angiogram;  Surgeon: Daniel Huffman MD;  Location: [facility] CARDIAC CATH LAB     CV FRACTIONAL FLOW RESERVE N/A 3/10/2020    Procedure: Fractional Flow Reserve;  Surgeon: Daniel Huffman MD;   Location:  HEART CARDIAC CATH LAB     CV INSTANTANEOUS WAVE-FREE RATIO N/A 3/10/2020    Procedure: Instantaneous Wave-Free Ratio;  Surgeon: Daniel Huffman MD;  Location:  HEART CARDIAC CATH LAB     CV LEFT HEART CATH N/A 3/10/2020    Procedure: Left Heart Cath;  Surgeon: Daniel Huffman MD;  Location:  HEART CARDIAC CATH LAB     CV LEFT VENTRICULOGRAM N/A 3/10/2020    Procedure: Left Ventriculogram;  Surgeon: Daniel Huffman MD;  Location:  HEART CARDIAC CATH LAB     ESOPHAGOSCOPY, GASTROSCOPY, DUODENOSCOPY (EGD), COMBINED N/A 12/21/2017    Procedure: COMBINED ESOPHAGOSCOPY, GASTROSCOPY, DUODENOSCOPY (EGD), BIOPSY SINGLE OR MULTIPLE;  gastroscopy;  Surgeon: Srinivasa Jackson MD;  Location:  GI     TUBAL LIGATION  1990              Allergies:   Penicillins       Data:   All laboratory data reviewed:    LAST CHOLESTEROL:  Lab Results   Component Value Date    CHOL 158 05/27/2020     Lab Results   Component Value Date    HDL 66 05/27/2020     Lab Results   Component Value Date    LDL 64 05/27/2020     Lab Results   Component Value Date    TRIG 142 05/27/2020     Lab Results   Component Value Date    CHOLHDLRATIO 2.6 08/07/2015       LAST BMP:  Lab Results   Component Value Date     03/10/2020      Lab Results   Component Value Date    POTASSIUM 4.1 03/10/2020     Lab Results   Component Value Date    CHLORIDE 112 03/10/2020     Lab Results   Component Value Date    CARMINA 9.2 03/10/2020     Lab Results   Component Value Date    CO2 19 03/10/2020     Lab Results   Component Value Date    BUN 17 03/10/2020     Lab Results   Component Value Date    CR 0.83 03/10/2020     Lab Results   Component Value Date    GLC 91 03/10/2020       LAST CBC:  Lab Results   Component Value Date    WBC 6.8 03/10/2020     Lab Results   Component Value Date    RBC 4.90 03/10/2020     Lab Results   Component Value Date    HGB 15.0 03/10/2020     Lab Results   Component Value Date    HCT 45.4 03/10/2020     Lab  Results   Component Value Date    MCV 93 03/10/2020     Lab Results   Component Value Date    MCH 30.6 03/10/2020     Lab Results   Component Value Date    MCHC 33.0 03/10/2020     Lab Results   Component Value Date    RDW 13.6 03/10/2020     Lab Results   Component Value Date     03/10/2020         Thank you for allowing me to participate in the care of your patient.    Sincerely,     KADEEM Ambriz [facility]

## 2020-11-23 NOTE — LETTER
11/23/2020    Ida Lyles MD  [address] [facility] [address]    RE: Debby Chan       Dear Colleague,    I had the pleasure of seeing Debby Chan in the [facility].    Cardiology Clinic Progress Note  Debby Chan MRN# [id number]   YOB: 1950 Age: 70 year old     Reason For Visit:  Follow up   Primary Cardiologist:   Dr. Moose Grant          History of Presenting Illness:      Debby Chan is a pleasant 70 year old patient who carries a past medical history significant for moderate CAD, reflux, family history of premature coronary disease, and hyperlipidemia. She returns to the office today for routine follow up.      She is feeling well on a cardiac standpoint, denies chest pain, shortness of breath, PND, orthopnea, presyncope, syncope, edema, heart racing, or palpitations.  Her primary complaint is some left-sided thoracic pain, likely muscular skeletal as it worsens with movement.    Her last coronary angiogram on 3/20/2020 demonstrated moderate coronary disease in the mid LAD with negative FFR, and mild disease in the ostial left main and mid RCA.  Echocardiogram showed a normal ejection fraction estimated at 65 to 70%, no wall motion abnormalities, and no significant valvular disease.    Blood pressure is well controlled at 120/75, managed on metoprolol.  Lipid panel showed a total cholesterol of 158, HDL 66, LDL 64, triglycerides 142  BMI 26.33.    Activity consists of walking regularly  Follows a strict heart healthy, low-sodium diet  Remains compliant with all medications.                   Assessment and Plan:       1. CAD - moderate LAD disease with negative FFR, mild left main and RCA disease.  Continue on aspirin, metoprolol, and statin.  Encourage exercise, weight loss, and heart healthy diet    2.  Hyperlipidemia -LDL at goal, continue on statin  3.  Reflux -managed on omeprazole             Thank  you for allowing me to participate in this delightful patient's care. I have recommended she follow up in 1 year with Dr. Martin or sooner if needed .       Jeanne Russ, KADEEM CNP         Review of Systems:     Review of Systems:  Skin:  Negative     Eyes:  Negative    ENT:  Negative    Respiratory:  Positive for dyspnea on exertion  Cardiovascular:       Gastroenterology: Positive for reflux  Genitourinary:  not assessed    Musculoskeletal:  Positive for nocturnal cramping;foot pain  Neurologic:  Negative    Psychiatric:  Negative    Heme/Lymph/Imm:  Negative    Endocrine:  Negative                Physical Exam:     GEN:  In general, this is a well nourished female in no acute distress.  HEENT:  Pupils equal, round. Sclerae nonicteric. Clear oropharynx. Mucous membranes moist.  NECK: Supple, no masses appreciated. Trachea midline. No JVD   C/V:  Regular rate and rhythm, No murmur, rub or gallop. No S3 or RV heave.   RESP: Respirations are unlabored. No use of accessory muscles. Clear to auscultation bilaterally without wheezing, rales, or rhonchi.  GI: Abdomen soft, nontender, nondistended. No HSM appreciated.   EXTREM: No LE edema. No cyanosis or clubbing.  NEURO: Alert and oriented, cooperative. No obvious focal deficits.   PSYCH: Normal affect.  SKIN: Warm and dry. No rashes or petechiae appreciated.          Past Medical History:     Past Medical History:   Diagnosis Date     Acid reflux disease      CAD (coronary artery disease)      Impaired fasting glucose      Pure hypercholesterolemia               Past Surgical History:     Past Surgical History:   Procedure Laterality Date     BREAST BIOPSY, RT/LT Right 1985    benign fibroadenoma     CV CORONARY ANGIOGRAM N/A 3/10/2020    Procedure: Coronary Angiogram;  Surgeon: Daniel Huffman MD;  Location: [facility] CARDIAC CATH LAB     CV FRACTIONAL FLOW RESERVE N/A 3/10/2020    Procedure: Fractional Flow Reserve;  Surgeon: Daniel Huffman MD;   Location:  HEART CARDIAC CATH LAB     CV INSTANTANEOUS WAVE-FREE RATIO N/A 3/10/2020    Procedure: Instantaneous Wave-Free Ratio;  Surgeon: Daniel Huffman MD;  Location:  HEART CARDIAC CATH LAB     CV LEFT HEART CATH N/A 3/10/2020    Procedure: Left Heart Cath;  Surgeon: Daniel Huffman MD;  Location:  HEART CARDIAC CATH LAB     CV LEFT VENTRICULOGRAM N/A 3/10/2020    Procedure: Left Ventriculogram;  Surgeon: Daniel Huffman MD;  Location:  HEART CARDIAC CATH LAB     ESOPHAGOSCOPY, GASTROSCOPY, DUODENOSCOPY (EGD), COMBINED N/A 12/21/2017    Procedure: COMBINED ESOPHAGOSCOPY, GASTROSCOPY, DUODENOSCOPY (EGD), BIOPSY SINGLE OR MULTIPLE;  gastroscopy;  Surgeon: Srinivasa Jackson MD;  Location:  GI     TUBAL LIGATION  1990              Allergies:   Penicillins       Data:   All laboratory data reviewed:    LAST CHOLESTEROL:  Lab Results   Component Value Date    CHOL 158 05/27/2020     Lab Results   Component Value Date    HDL 66 05/27/2020     Lab Results   Component Value Date    LDL 64 05/27/2020     Lab Results   Component Value Date    TRIG 142 05/27/2020     Lab Results   Component Value Date    CHOLHDLRATIO 2.6 08/07/2015       LAST BMP:  Lab Results   Component Value Date     03/10/2020      Lab Results   Component Value Date    POTASSIUM 4.1 03/10/2020     Lab Results   Component Value Date    CHLORIDE 112 03/10/2020     Lab Results   Component Value Date    CARMINA 9.2 03/10/2020     Lab Results   Component Value Date    CO2 19 03/10/2020     Lab Results   Component Value Date    BUN 17 03/10/2020     Lab Results   Component Value Date    CR 0.83 03/10/2020     Lab Results   Component Value Date    GLC 91 03/10/2020       LAST CBC:  Lab Results   Component Value Date    WBC 6.8 03/10/2020     Lab Results   Component Value Date    RBC 4.90 03/10/2020     Lab Results   Component Value Date    HGB 15.0 03/10/2020     Lab Results   Component Value Date    HCT 45.4 03/10/2020     Lab  Results   Component Value Date    MCV 93 03/10/2020     Lab Results   Component Value Date    MCH 30.6 03/10/2020     Lab Results   Component Value Date    MCHC 33.0 03/10/2020     Lab Results   Component Value Date    RDW 13.6 03/10/2020     Lab Results   Component Value Date     03/10/2020               Thank you for allowing me to participate in the care of your patient.      Sincerely,     KADEEM Ambriz CNP     [facility]    cc:   KADEEM Ambriz CNP  [address] [place] [address]

## 2020-11-23 NOTE — PROGRESS NOTES
Cardiology Clinic Progress Note  Debby Chan MRN# [id number]   YOB: 1950 Age: 70 year old     Reason For Visit:  Follow up   Primary Cardiologist:   Dr. Moose Grant          History of Presenting Illness:      Debby Chan is a pleasant 70 year old patient who carries a past medical history significant for moderate CAD, reflux, family history of premature coronary disease, and hyperlipidemia. She returns to the office today for routine follow up.      She is feeling well on a cardiac standpoint, denies chest pain, shortness of breath, PND, orthopnea, presyncope, syncope, edema, heart racing, or palpitations.  Her primary complaint is some left-sided thoracic pain, likely muscular skeletal as it worsens with movement.    Her last coronary angiogram on 3/20/2020 demonstrated moderate coronary disease in the mid LAD with negative FFR, and mild disease in the ostial left main and mid RCA.  Echocardiogram showed a normal ejection fraction estimated at 65 to 70%, no wall motion abnormalities, and no significant valvular disease.    Blood pressure is well controlled at 120/75, managed on metoprolol.  Lipid panel showed a total cholesterol of 158, HDL 66, LDL 64, triglycerides 142  BMI 26.33.    Activity consists of walking regularly  Follows a strict heart healthy, low-sodium diet  Remains compliant with all medications.                   Assessment and Plan:       1. CAD - moderate LAD disease with negative FFR, mild left main and RCA disease.  Continue on aspirin, metoprolol, and statin.  Encourage exercise, weight loss, and heart healthy diet    2.  Hyperlipidemia -LDL at goal, continue on statin  3.  Reflux -managed on omeprazole             Thank you for allowing me to participate in this delightful patient's care. I have recommended she follow up in 1 year with Dr. Martin or sooner if needed .       KADEEM Ambriz CNP         Review of Systems:     Review of  Systems:  Skin:  Negative     Eyes:  Negative    ENT:  Negative    Respiratory:  Positive for dyspnea on exertion  Cardiovascular:       Gastroenterology: Positive for reflux  Genitourinary:  not assessed    Musculoskeletal:  Positive for nocturnal cramping;foot pain  Neurologic:  Negative    Psychiatric:  Negative    Heme/Lymph/Imm:  Negative    Endocrine:  Negative                Physical Exam:     GEN:  In general, this is a well nourished female in no acute distress.  HEENT:  Pupils equal, round. Sclerae nonicteric. Clear oropharynx. Mucous membranes moist.  NECK: Supple, no masses appreciated. Trachea midline. No JVD   C/V:  Regular rate and rhythm, No murmur, rub or gallop. No S3 or RV heave.   RESP: Respirations are unlabored. No use of accessory muscles. Clear to auscultation bilaterally without wheezing, rales, or rhonchi.  GI: Abdomen soft, nontender, nondistended. No HSM appreciated.   EXTREM: No LE edema. No cyanosis or clubbing.  NEURO: Alert and oriented, cooperative. No obvious focal deficits.   PSYCH: Normal affect.  SKIN: Warm and dry. No rashes or petechiae appreciated.          Past Medical History:     Past Medical History:   Diagnosis Date     Acid reflux disease      CAD (coronary artery disease)      Impaired fasting glucose      Pure hypercholesterolemia               Past Surgical History:     Past Surgical History:   Procedure Laterality Date     BREAST BIOPSY, RT/LT Right 1985    benign fibroadenoma     CV CORONARY ANGIOGRAM N/A 3/10/2020    Procedure: Coronary Angiogram;  Surgeon: Daniel Huffman MD;  Location:  HEART CARDIAC CATH LAB     CV FRACTIONAL FLOW RESERVE N/A 3/10/2020    Procedure: Fractional Flow Reserve;  Surgeon: Dainel Huffman MD;  Location:  HEART CARDIAC CATH LAB     CV INSTANTANEOUS WAVE-FREE RATIO N/A 3/10/2020    Procedure: Instantaneous Wave-Free Ratio;  Surgeon: Daniel Huffman MD;  Location:  HEART CARDIAC CATH LAB     CV LEFT HEART CATH N/A  3/10/2020    Procedure: Left Heart Cath;  Surgeon: Daniel Huffman MD;  Location:  HEART CARDIAC CATH LAB     CV LEFT VENTRICULOGRAM N/A 3/10/2020    Procedure: Left Ventriculogram;  Surgeon: Daniel Huffman MD;  Location:  HEART CARDIAC CATH LAB     ESOPHAGOSCOPY, GASTROSCOPY, DUODENOSCOPY (EGD), COMBINED N/A 12/21/2017    Procedure: COMBINED ESOPHAGOSCOPY, GASTROSCOPY, DUODENOSCOPY (EGD), BIOPSY SINGLE OR MULTIPLE;  gastroscopy;  Surgeon: Srinivasa Jackson MD;  Location:  GI     TUBAL LIGATION  1990              Allergies:   Penicillins       Data:   All laboratory data reviewed:    LAST CHOLESTEROL:  Lab Results   Component Value Date    CHOL 158 05/27/2020     Lab Results   Component Value Date    HDL 66 05/27/2020     Lab Results   Component Value Date    LDL 64 05/27/2020     Lab Results   Component Value Date    TRIG 142 05/27/2020     Lab Results   Component Value Date    CHOLHDLRATIO 2.6 08/07/2015       LAST BMP:  Lab Results   Component Value Date     03/10/2020      Lab Results   Component Value Date    POTASSIUM 4.1 03/10/2020     Lab Results   Component Value Date    CHLORIDE 112 03/10/2020     Lab Results   Component Value Date    CARMINA 9.2 03/10/2020     Lab Results   Component Value Date    CO2 19 03/10/2020     Lab Results   Component Value Date    BUN 17 03/10/2020     Lab Results   Component Value Date    CR 0.83 03/10/2020     Lab Results   Component Value Date    GLC 91 03/10/2020       LAST CBC:  Lab Results   Component Value Date    WBC 6.8 03/10/2020     Lab Results   Component Value Date    RBC 4.90 03/10/2020     Lab Results   Component Value Date    HGB 15.0 03/10/2020     Lab Results   Component Value Date    HCT 45.4 03/10/2020     Lab Results   Component Value Date    MCV 93 03/10/2020     Lab Results   Component Value Date    MCH 30.6 03/10/2020     Lab Results   Component Value Date    MCHC 33.0 03/10/2020     Lab Results   Component Value Date    RDW 13.6  03/10/2020     Lab Results   Component Value Date     03/10/2020

## 2020-11-23 NOTE — PATIENT INSTRUCTIONS
Thanks for participating in a office visit with the [facility] today.    Doing well on a cardiac standpoint  Blood pressures well controlled, continue on current medical therapy.   Reviewed images of coronary angiogram, moderate disease in the LAD  Encourage risk modification, exercise, diet, and weight loss.     Follow up in 1 year with Dr. Martin     Please call my nurse at [phone] with any questions or concerns.    Scheduling phone number: [phone]  Reminder: Please bring in all current medications, over the counter supplements and vitamin bottles to your next appointment.

## 2021-01-20 ENCOUNTER — MYC MEDICAL ADVICE (OUTPATIENT)
Dept: INTERNAL MEDICINE | Facility: CLINIC | Age: 71
End: 2021-01-20

## 2021-01-20 DIAGNOSIS — E78.00 PURE HYPERCHOLESTEROLEMIA: ICD-10-CM

## 2021-01-20 DIAGNOSIS — K21.9 GASTROESOPHAGEAL REFLUX DISEASE, UNSPECIFIED WHETHER ESOPHAGITIS PRESENT: Primary | ICD-10-CM

## 2021-01-20 DIAGNOSIS — R94.39 ABNORMAL STRESS TEST: ICD-10-CM

## 2021-01-20 RX ORDER — OMEPRAZOLE 40 MG/1
CAPSULE, DELAYED RELEASE ORAL
Qty: 90 CAPSULE | Refills: 1 | Status: SHIPPED | OUTPATIENT
Start: 2021-01-20 | End: 2021-07-01

## 2021-01-20 RX ORDER — ATORVASTATIN CALCIUM 40 MG/1
40 TABLET, FILM COATED ORAL DAILY
Qty: 90 TABLET | Refills: 0 | Status: SHIPPED | OUTPATIENT
Start: 2021-01-20 | End: 2021-07-01

## 2021-01-20 RX ORDER — METOPROLOL SUCCINATE 25 MG/1
25 TABLET, EXTENDED RELEASE ORAL DAILY
Qty: 90 TABLET | Refills: 1 | Status: SHIPPED | OUTPATIENT
Start: 2021-01-20 | End: 2021-07-01

## 2021-01-25 ENCOUNTER — HOSPITAL ENCOUNTER (OUTPATIENT)
Facility: CLINIC | Age: 71
End: 2021-01-25
Attending: COLON & RECTAL SURGERY | Admitting: COLON & RECTAL SURGERY
Payer: MEDICARE

## 2021-01-25 DIAGNOSIS — Z11.59 ENCOUNTER FOR SCREENING FOR OTHER VIRAL DISEASES: ICD-10-CM

## 2021-02-22 ENCOUNTER — TRANSFERRED RECORDS (OUTPATIENT)
Dept: HEALTH INFORMATION MANAGEMENT | Facility: CLINIC | Age: 71
End: 2021-02-22

## 2021-03-03 ENCOUNTER — OFFICE VISIT (OUTPATIENT)
Dept: INTERNAL MEDICINE | Facility: CLINIC | Age: 71
End: 2021-03-03
Payer: MEDICARE

## 2021-03-03 ENCOUNTER — ANCILLARY PROCEDURE (OUTPATIENT)
Dept: GENERAL RADIOLOGY | Facility: CLINIC | Age: 71
End: 2021-03-03
Attending: INTERNAL MEDICINE
Payer: MEDICARE

## 2021-03-03 VITALS
WEIGHT: 151 LBS | HEART RATE: 79 BPM | OXYGEN SATURATION: 99 % | DIASTOLIC BLOOD PRESSURE: 70 MMHG | TEMPERATURE: 97.1 F | RESPIRATION RATE: 16 BRPM | BODY MASS INDEX: 25.32 KG/M2 | SYSTOLIC BLOOD PRESSURE: 122 MMHG

## 2021-03-03 DIAGNOSIS — R07.81 RIB PAIN ON LEFT SIDE: ICD-10-CM

## 2021-03-03 DIAGNOSIS — R07.81 RIB PAIN ON LEFT SIDE: Primary | ICD-10-CM

## 2021-03-03 PROCEDURE — 99213 OFFICE O/P EST LOW 20 MIN: CPT | Performed by: INTERNAL MEDICINE

## 2021-03-03 PROCEDURE — 71046 X-RAY EXAM CHEST 2 VIEWS: CPT | Performed by: RADIOLOGY

## 2021-03-03 NOTE — PROGRESS NOTES
ASSESSMENT/PLAN                                                      (R07.81) Rib pain on left side  (primary encounter diagnosis)  Comment: more specifically, pain underneath left lower ribs; relieved with wearing a bra; no red flag symptoms; etiology unclear.  Plan: CXR today; continue wearing a bra as often as needed for relief of symptoms; if symptoms worsen, change, or do not improve, patient to contact MD.      Ida Lyles MD   [address] [address]  T: [phone], F: [phone]    SUBJECTIVE                                                      Debby Chan is a very pleasant 70 year old female who presents with left rib pain:    Ongoing for several months. Had similar symptoms last year. No known trauma or injury. Symptoms occur when she is not wearing her bra and with deep palpation under her left ribs. Has been wearing a bra during the day and night, with relief of symptoms.      No shortness of breath or cough. No chest pain or palpitations. No nausea, vomiting, constipation, diarrhea, melena, or hematochezia. No fevers or chills.  No anorexia, unintentional weight loss, or night sweats.    OBJECTIVE                                                      /70 (BP Location: Left arm, Patient Position: Chair, Cuff Size: Adult Regular)   Pulse 79   Temp 97.1  F (36.2  C) (Temporal)   Resp 16   Wt 68.5 kg (151 lb)   SpO2 99%   BMI 25.32 kg/m    Constitutional: well-appearing  Respiratory: normal respiratory effort; clear to auscultation bilaterally  Cardiovascular: regular rate and rhythm; no edema  Chest wall: ribs and sternum nontender to palpation and compression; pain reproduced with deep palpation under left lower ribs  Gastrointestinal: soft, non-tender, non-distended; no organomegaly or masses     ---    (Note documentation was completed, in part, with [employer] voice-recognition software. Documentation was  reviewed, but some grammatical, spelling, and word errors may remain.)

## 2021-03-12 ENCOUNTER — MYC MEDICAL ADVICE (OUTPATIENT)
Dept: INTERNAL MEDICINE | Facility: CLINIC | Age: 71
End: 2021-03-12

## 2021-04-23 ENCOUNTER — OFFICE VISIT (OUTPATIENT)
Dept: INTERNAL MEDICINE | Facility: CLINIC | Age: 71
End: 2021-04-23
Payer: MEDICARE

## 2021-04-23 VITALS
BODY MASS INDEX: 25.83 KG/M2 | DIASTOLIC BLOOD PRESSURE: 74 MMHG | WEIGHT: 154 LBS | TEMPERATURE: 98.6 F | OXYGEN SATURATION: 97 % | HEART RATE: 77 BPM | SYSTOLIC BLOOD PRESSURE: 114 MMHG

## 2021-04-23 DIAGNOSIS — S93.602A FOOT SPRAIN, LEFT, INITIAL ENCOUNTER: Primary | ICD-10-CM

## 2021-04-23 PROCEDURE — 99213 OFFICE O/P EST LOW 20 MIN: CPT | Performed by: INTERNAL MEDICINE

## 2021-04-23 NOTE — PROGRESS NOTES
"    Assessment & Plan     (W19.327G) Foot sprain, left, initial encounter  (primary encounter diagnosis)  Comment:   No evidence for fracture based on exam.  Rest and elevate the affected painful area.    Xray not really indiacted based on exam and history.   Offered Xray anyway, but she declined  Apply cold compresses intermittently as needed.    As pain recedes, begin normal activities slowly as tolerated.    Disucsed rehab exercises (proprioceptive, stretching, strengthening, etc) , discussed proper shoe choices, discussed activity with moderation and slow return to activty.   NSAIDs prn.    Discussed foot brace options inclduing AirCast sankle splint, and Aircast boot.     Plan:                 BMI:   Estimated body mass index is 25.83 kg/m  as calculated from the following:    Height as of 11/23/20: 1.645 m (5' 4.75\").    Weight as of this encounter: 69.9 kg (154 lb).           Return in about 2 weeks (around 5/7/2021) for for recheck, if the symptoms fail to improve.    James Grant MD  [facility]    Mikey Guardado is a 70 year old who presents for the following health issues     HPI     Follow up of fall   missed step off of curb last sat.  Twisted left ankle, hips and left low back  Left foot bruised and painful at that time.       Was luis eduardo to walk after the fall, she finishsed her shopping.   Had bruising on the foot, but that is resolving.   symptos overall have been improving, just not as quickly as she desired.     Has been wearing better more supportive shoes.     Left foot a little less table, bnut no imbalance.     Able to bear full weight without seriosu pain at this time.       **I reviewed the information recorded in the patient's EPIC chart (including but not limited to medical history, surgical history, family history, problem list, medication list, and allergy list) and updated the information as indicated based on the patients reported " information.         Review of Systems   Constitutional, HEENT, cardiovascular, pulmonary, gi and gu systems are negative, except as otherwise noted.      Objective    /74   Pulse 77   Temp 98.6  F (37  C) (Tympanic)   Wt 69.9 kg (154 lb)   LMP  (LMP Unknown)   SpO2 97%   Breastfeeding No   BMI 25.83 kg/m    Body mass index is 25.83 kg/m .  Physical Exam   GENERAL alert and no distress  EYES:  Normal sclera,conjunctiva, EOMI  HENT: facies symmetric  MS: extremities- no gross deformities of the visible extremities noted,   EXT:  no lower extremity edema  PSYCH: Alert and oriented times 3; speech- coherent  SKIN:  No obvious significant skin lesions on visible portions of face   Left FOOT: no welling, resolving ecchymoses on bottom of feet, ankle and foot normal  range of motion in all directions, minimal pain with palpation, no mortis tendenress, no pain at distal fibula or tibia, no first or fifth metatarsal tenderness

## 2021-04-23 NOTE — PATIENT INSTRUCTIONS
FOOT SPRAIN:       *  Ice the the area    *  This should slowly resolve over the next several days.      *  During this recovery  time, your foot is more vulnerable to re-injury due to the fact that the ligaments are not able to provide as much support for the bones of the foot/ankle.      *  Ice the affected area on the foot as needed.     *  Return to activity as tolerated.     *  Wear shoes with a firmer sole (such that you would not feel a pebble on the sidewalk if you stepped on it).      *  For the next week or so during the more intense inflammation.    *  For mild inflamamtion/irritaiton:  Extyra strength Acetaminophen (tylenol) 1000 mg, 2-3 times per day.      *  take over the counter Motrin/Ibuprofen/Advil or Aleve to help relieve pain and inflammation.  follow the directions on the bottle.  Be sure to take with a small meal to prevent stomach upset.      --Motrin 600 mg ( 3 x 200 mg tablets) three times per day every day for 5-7 days, then 2-3 timers per day as needed (take with food to avoid stomach side effects)     OR     --Aleve 2 tablets twice per day for 5-7 days every day, then twice per day as needed     *  If your symptoms get worse despite these measures, then let me know.

## 2021-06-29 DIAGNOSIS — K21.9 GASTROESOPHAGEAL REFLUX DISEASE, UNSPECIFIED WHETHER ESOPHAGITIS PRESENT: ICD-10-CM

## 2021-06-29 DIAGNOSIS — E78.00 PURE HYPERCHOLESTEROLEMIA: ICD-10-CM

## 2021-06-29 DIAGNOSIS — R94.39 ABNORMAL STRESS TEST: ICD-10-CM

## 2021-07-01 RX ORDER — ATORVASTATIN CALCIUM 40 MG/1
TABLET, FILM COATED ORAL
Qty: 90 TABLET | Refills: 0 | Status: SHIPPED | OUTPATIENT
Start: 2021-07-01 | End: 2021-09-17

## 2021-07-01 RX ORDER — METOPROLOL SUCCINATE 25 MG/1
TABLET, EXTENDED RELEASE ORAL
Qty: 90 TABLET | Refills: 2 | Status: SHIPPED | OUTPATIENT
Start: 2021-07-01 | End: 2021-11-29

## 2021-07-01 RX ORDER — OMEPRAZOLE 40 MG/1
CAPSULE, DELAYED RELEASE ORAL
Qty: 90 CAPSULE | Refills: 2 | Status: SHIPPED | OUTPATIENT
Start: 2021-07-01 | End: 2021-11-29

## 2021-07-01 NOTE — TELEPHONE ENCOUNTER
Metoprolol  Prescription approved per [facility] Refill Protocol.    Omeprazole  Prescription approved per [facility] Refill Protocol.

## 2021-07-01 NOTE — TELEPHONE ENCOUNTER
Atorvastatin  Routing refill request to provider for review/approval because:  Labs not current:  LDL

## 2021-09-16 DIAGNOSIS — E78.00 PURE HYPERCHOLESTEROLEMIA: ICD-10-CM

## 2021-09-17 RX ORDER — ATORVASTATIN CALCIUM 40 MG/1
TABLET, FILM COATED ORAL
Qty: 90 TABLET | Refills: 0 | Status: SHIPPED | OUTPATIENT
Start: 2021-09-17 | End: 2021-11-29

## 2021-09-17 NOTE — TELEPHONE ENCOUNTER
Routing refill request to provider for review/approval because:  Labs not current:    LDL Cholesterol Calculated   Date Value Ref Range Status   05/27/2020 64 <100 mg/dL Final     Comment:     Desirable:       <100 mg/dl         Joshua Matamoros RN  [facility] Triage Nurse

## 2021-11-17 ENCOUNTER — TRANSFERRED RECORDS (OUTPATIENT)
Dept: HEALTH INFORMATION MANAGEMENT | Facility: CLINIC | Age: 71
End: 2021-11-17
Payer: MEDICARE

## 2021-11-22 ENCOUNTER — OFFICE VISIT (OUTPATIENT)
Dept: CARDIOLOGY | Facility: CLINIC | Age: 71
End: 2021-11-22
Attending: NURSE PRACTITIONER
Payer: MEDICARE

## 2021-11-22 VITALS
WEIGHT: 159 LBS | DIASTOLIC BLOOD PRESSURE: 64 MMHG | SYSTOLIC BLOOD PRESSURE: 118 MMHG | HEART RATE: 61 BPM | OXYGEN SATURATION: 99 % | BODY MASS INDEX: 26.66 KG/M2

## 2021-11-22 DIAGNOSIS — I25.10 CORONARY ARTERY DISEASE INVOLVING NATIVE CORONARY ARTERY OF NATIVE HEART WITHOUT ANGINA PECTORIS: ICD-10-CM

## 2021-11-22 DIAGNOSIS — E78.5 HYPERLIPIDEMIA LDL GOAL <70: ICD-10-CM

## 2021-11-22 DIAGNOSIS — I20.89 OTHER FORMS OF ANGINA PECTORIS (H): Primary | ICD-10-CM

## 2021-11-22 PROCEDURE — 99214 OFFICE O/P EST MOD 30 MIN: CPT | Performed by: INTERNAL MEDICINE

## 2021-11-22 RX ORDER — MULTIVIT-MIN/IRON/FOLIC ACID/K 18-600-40
CAPSULE ORAL DAILY
COMMUNITY

## 2021-11-22 NOTE — PROGRESS NOTES
Service Date: 11/22/2021    HISTORY OF PRESENT ILLNESS:  It was my pleasure to see your patient, Debby Chan, who is a very pleasant 71-year-old patient with a history of atypical chest discomfort, who has reflux esophagitis, but who also has moderate coronary artery disease based upon coronary angiography last year showing a 60% stenosis in the LAD, which was non-flow limiting based upon fractional flow reserve.  The difficulty with this patient is that her symptoms of reflux are very similar to cardiac type chest discomfort.  She tells me that she does get discomfort in her chest when she exerts herself and it is relieved by rest.  She thinks that maybe the discomfort is worse over the last few months.  She finds it very difficult to distinguish this discomfort from her reflux discomfort.    Her last lipid profile was a year and a half ago with an LDL of 64, HDL of 66 and triglycerides of 142 with a total cholesterol of 158.      Her blood pressure is well controlled here today at 118/64 with a pulse rate of 61 beats per minute, regular in rhythm and volume consistent with sinus rhythm.    IMPRESSION:    1.  Chest discomfort.  It is very difficult to know whether this is reflux or whether this is angina pectoris.  The discomfort comes on with exertion and is relieved by rest, but so is her reflux discomfort.  She is taking omeprazole for this discomfort and this is the same treatment that she has had for her reflux from the time that we first saw her.  2.  Normotensive.  3.  Excellent lipid profile a year and a half ago.  The patient is due to have her cholesterol checked next week.  I did remind her to be fasting for 8 hours.    PLAN:    1.  We will obtain a stress echocardiogram to determine if there is any evidence of ischemia.  The patient typically has nonspecific EKG changes.  It is the imaging portion that we are interested in stable condition.  2.  I have told her that she should really see the  gastroenterologist again because she has ongoing symptoms of reflux.  She has not been pain-free and she is on exactly the same proton blockers that she was when we last first saw her.  We will discuss the results of the stress echo by phone with her.  Obviously, if there are any abnormalities on the stress echocardiogram.  We will see her sooner.  Otherwise, we will see her back again in 1 year's time and hopefully by that stage, she will have had a consultation with the gastroenterologist to try and get on top of this reflux pain, if it turns out to be reflux pain.    It is my pleasure to be involved the care of this very nice patient.    cc:   Ida Lyles MD   [facility]  [address] [place] [id number]     Ranulfo Grant [facility], [facility]        D: 2021   T: 2021   MT: IRINA    Name:     LISBETH HAYES  MRN:      [id number]        Account:      [id number]   :      1950           Service Date: 2021       Document: [id number]

## 2021-11-22 NOTE — LETTER
11/22/2021    Ida Lyles MD  [address] [facility] [facility] [address]    RE: Debby Chan       Dear Colleague,    I had the pleasure of seeing Debby ALLEY Chan in the [facility].    HPI and Plan:   See dictation        Orders Placed This Encounter   Procedures     Lipid Profile     ALT     Follow-Up with Cardiologist     Exercise Stress Echocardiogram       Orders Placed This Encounter   Medications     UNABLE TO FIND     Sig: Instaflex for joints     Ascorbic Acid (VITAMIN C) 500 MG CAPS       There are no discontinued medications.      Encounter Diagnoses   Name Primary?     Coronary artery disease involving native coronary artery of native heart without angina pectoris      Hyperlipidemia LDL goal <70      Other forms of angina pectoris (H) Yes       CURRENT MEDICATIONS:  Current Outpatient Medications   Medication Sig Dispense Refill     Ascorbic Acid (VITAMIN C) 500 MG CAPS        aspirin 81 MG EC tablet Take 1 tablet (81 mg) by mouth daily       atorvastatin (LIPITOR) 40 MG tablet TAKE 1 TABLET DAILY 90 tablet 0     Calcium Carbonate-Vitamin D (CALCIUM + D PO) Take  by mouth. Plus magnesium       Cholecalciferol (VITAMIN D) 2000 UNITS tablet Take 1 tablet by mouth daily.       metoprolol succinate ER (TOPROL-XL) 25 MG 24 hr tablet TAKE 1 TABLET DAILY 90 tablet 2     omeprazole (PRILOSEC) 40 MG DR capsule TAKE 1 CAPSULE DAILY 30-60 MINUTES BEFORE A MEAL. 90 capsule 2     triamcinolone (KENALOG) 0.025 % cream Apply topically 2 times daily A thin layer to eyelids x 1-2 weeks. 15 g 0     triamcinolone (KENALOG) 0.1 % external ointment Apply sparingly to affected area three times daily for 14 days. 30 g 0     UNABLE TO FIND Instaflex for joints         ALLERGIES     Allergies   Allergen Reactions     Penicillins Nausea       PAST MEDICAL HISTORY:  Past Medical History:   Diagnosis Date     Acid reflux disease      CAD (coronary artery  disease)      Impaired fasting glucose      Pure hypercholesterolemia        PAST SURGICAL HISTORY:  Past Surgical History:   Procedure Laterality Date     BREAST BIOPSY, RT/LT Right 1985    benign fibroadenoma     CV CORONARY ANGIOGRAM N/A 3/10/2020    Procedure: Coronary Angiogram;  Surgeon: Daniel Huffman MD;  Location:  HEART CARDIAC CATH LAB     CV FRACTIONAL FLOW RATIO WIRE N/A 3/10/2020    Procedure: Fractional Flow Reserve;  Surgeon: Daniel Huffman MD;  Location:  HEART CARDIAC CATH LAB     CV INSTANTANEOUS WAVE-FREE RATIO N/A 3/10/2020    Procedure: Instantaneous Wave-Free Ratio;  Surgeon: Daniel Huffman MD;  Location:  HEART CARDIAC CATH LAB     CV LEFT HEART CATH N/A 3/10/2020    Procedure: Left Heart Cath;  Surgeon: Daniel Huffman MD;  Location:  HEART CARDIAC CATH LAB     CV LEFT VENTRICULOGRAM N/A 3/10/2020    Procedure: Left Ventriculogram;  Surgeon: Daniel Huffman MD;  Location:  HEART CARDIAC CATH LAB     ESOPHAGOSCOPY, GASTROSCOPY, DUODENOSCOPY (EGD), COMBINED N/A 12/21/2017    Procedure: COMBINED ESOPHAGOSCOPY, GASTROSCOPY, DUODENOSCOPY (EGD), BIOPSY SINGLE OR MULTIPLE;  gastroscopy;  Surgeon: Srinivasa Jackson MD;  Location:  GI     TUBAL LIGATION  1990       FAMILY HISTORY:  Family History   Problem Relation Age of Onset     Myocardial Infarction Father         later in life; s/p PCI     Prostate Cancer Father      Diabetes Type 2  Father      Skin Cancer Father      Heart Failure Mother      Uterine Cancer Paternal Aunt      Melanoma Sister      Cerebrovascular Disease No family hx of      Coronary Artery Disease Early Onset No family hx of      Ovarian Cancer No family hx of      Colon Cancer No family hx of        SOCIAL HISTORY:  Social History     Socioeconomic History     Marital status:      Spouse name: None     Number of children: None     Years of education: None     Highest education level: None   Occupational History     Occupation:  Retired from [employer]   Tobacco Use     Smoking status: Never Smoker     Smokeless tobacco: Never Used   Substance and Sexual Activity     Alcohol use: Yes     Comment: occasional     Drug use: No     Sexual activity: Not Currently     Partners: Male   Other Topics Concern     Parent/sibling w/ CABG, MI or angioplasty before 65F 55M? No   Social History Narrative    .    No kids.    No formal exercise, but stays active.      Social Determinants of Health     Financial Resource Strain: Not on file   Food Insecurity: Not on file   Transportation Needs: Not on file   Physical Activity: Not on file   Stress: Not on file   Social Connections: Not on file   Intimate Partner Violence: Not on file   Housing Stability: Not on file       Review of Systems:  Skin:  Negative       Eyes:  Negative      ENT:  Negative      Respiratory:  Positive for dyspnea on exertion some orthopnea happening   Cardiovascular:    Positive for;lightheadedness tightness in chest when starting out walking, then loosens up after walking  Gastroenterology: Positive for reflux    Genitourinary:  not assessed      Musculoskeletal:  Positive for nocturnal cramping;foot pain    Neurologic:  Negative      Psychiatric:  Negative      Heme/Lymph/Imm:  Negative      Endocrine:  Negative        Physical Exam:  Vitals: /64   Pulse 61   Wt 72.1 kg (159 lb)   LMP  (LMP Unknown)   SpO2 99%   BMI 26.66 kg/m      Constitutional:  cooperative, alert and oriented, well developed, well nourished, in no acute distress;cooperative        Skin:  warm and dry to the touch, no apparent skin lesions or masses noted          Head:  normocephalic, no masses or lesions        Eyes:  pupils equal and round        Lymph:No Cervical lymphadenopathy present     ENT:  no pallor or cyanosis        Neck:  carotid pulses are full and equal bilaterally, JVP normal, no carotid bruit        Respiratory:  normal breath sounds, clear to auscultation, normal A-P diameter,  normal symmetry, normal respiratory excursion, no use of accessory muscles         Cardiac: regular rhythm, normal S1/S2, no S3 or S4, apical impulse not displaced, no murmurs, gallops or rubs                pulses full and equal                                        GI:  not assessed this visit        Extremities and Muscular Skeletal:  no deformities, clubbing, cyanosis, erythema observed;no edema              Neurological:  no gross motor deficits;affect appropriate        Psych:  Alert and Oriented x 3        CC  KADEEM Ambriz CNP  [address] [place] [address]    Thank you for allowing me to participate in the care of your patient.      Sincerely,     Ranulfo Martin MD, MD     [facility]  cc:   KADEEM Ambriz CNP  [address] [place] [address]

## 2021-11-22 NOTE — PROGRESS NOTES
HPI and Plan:   See dictation        Orders Placed This Encounter   Procedures     Lipid Profile     ALT     Follow-Up with Cardiologist     Exercise Stress Echocardiogram       Orders Placed This Encounter   Medications     UNABLE TO FIND     Sig: Instaflex for joints     Ascorbic Acid (VITAMIN C) 500 MG CAPS       There are no discontinued medications.      Encounter Diagnoses   Name Primary?     Coronary artery disease involving native coronary artery of native heart without angina pectoris      Hyperlipidemia LDL goal <70      Other forms of angina pectoris (H) Yes       CURRENT MEDICATIONS:  Current Outpatient Medications   Medication Sig Dispense Refill     Ascorbic Acid (VITAMIN C) 500 MG CAPS        aspirin 81 MG EC tablet Take 1 tablet (81 mg) by mouth daily       atorvastatin (LIPITOR) 40 MG tablet TAKE 1 TABLET DAILY 90 tablet 0     Calcium Carbonate-Vitamin D (CALCIUM + D PO) Take  by mouth. Plus magnesium       Cholecalciferol (VITAMIN D) 2000 UNITS tablet Take 1 tablet by mouth daily.       metoprolol succinate ER (TOPROL-XL) 25 MG 24 hr tablet TAKE 1 TABLET DAILY 90 tablet 2     omeprazole (PRILOSEC) 40 MG DR capsule TAKE 1 CAPSULE DAILY 30-60 MINUTES BEFORE A MEAL. 90 capsule 2     triamcinolone (KENALOG) 0.025 % cream Apply topically 2 times daily A thin layer to eyelids x 1-2 weeks. 15 g 0     triamcinolone (KENALOG) 0.1 % external ointment Apply sparingly to affected area three times daily for 14 days. 30 g 0     UNABLE TO FIND Instaflex for joints         ALLERGIES     Allergies   Allergen Reactions     Penicillins Nausea       PAST MEDICAL HISTORY:  Past Medical History:   Diagnosis Date     Acid reflux disease      CAD (coronary artery disease)      Impaired fasting glucose      Pure hypercholesterolemia        PAST SURGICAL HISTORY:  Past Surgical History:   Procedure Laterality Date     BREAST BIOPSY, RT/LT Right 1985    benign fibroadenoma     CV CORONARY ANGIOGRAM N/A 3/10/2020     Procedure: Coronary Angiogram;  Surgeon: Daniel Huffman MD;  Location:  [facility]     CV FRACTIONAL FLOW RATIO WIRE N/A 3/10/2020    Procedure: Fractional Flow Reserve;  Surgeon: Daniel Huffman MD;  Location:  [facility]     CV INSTANTANEOUS WAVE-FREE RATIO N/A 3/10/2020    Procedure: Instantaneous Wave-Free Ratio;  Surgeon: Daniel Huffman MD;  Location:  [facility]     CV LEFT HEART CATH N/A 3/10/2020    Procedure: Left Heart Cath;  Surgeon: Daniel Huffman MD;  Location:  [facility]     CV LEFT VENTRICULOGRAM N/A 3/10/2020    Procedure: Left Ventriculogram;  Surgeon: Daniel Huffman MD;  Location:  [facility]     ESOPHAGOSCOPY, GASTROSCOPY, DUODENOSCOPY (EGD), COMBINED N/A 12/21/2017    Procedure: COMBINED ESOPHAGOSCOPY, GASTROSCOPY, DUODENOSCOPY (EGD), BIOPSY SINGLE OR MULTIPLE;  gastroscopy;  Surgeon: Srinivasa Jackson MD;  Location:  GI     TUBAL LIGATION  1990       FAMILY HISTORY:  Family History   Problem Relation Age of Onset     Myocardial Infarction Father         later in life; s/p PCI     Prostate Cancer Father      Diabetes Type 2  Father      Skin Cancer Father      Heart Failure Mother      Uterine Cancer Paternal Aunt      Melanoma Sister      Cerebrovascular Disease No family hx of      Coronary Artery Disease Early Onset No family hx of      Ovarian Cancer No family hx of      Colon Cancer No family hx of        SOCIAL HISTORY:  Social History     Socioeconomic History     Marital status:      Spouse name: None     Number of children: None     Years of education: None     Highest education level: None   Occupational History     Occupation: Retired from [employer]   Tobacco Use     Smoking status: Never Smoker     Smokeless tobacco: Never Used   Substance and Sexual Activity     Alcohol use: Yes     Comment: occasional     Drug use: No     Sexual activity: Not Currently     Partners: Male   Other  Topics Concern     Parent/sibling w/ CABG, MI or angioplasty before 65F 55M? No   Social History Narrative    .    No kids.    No formal exercise, but stays active.      Social Determinants of Health     Financial Resource Strain: Not on file   Food Insecurity: Not on file   Transportation Needs: Not on file   Physical Activity: Not on file   Stress: Not on file   Social Connections: Not on file   Intimate Partner Violence: Not on file   Housing Stability: Not on file       Review of Systems:  Skin:  Negative       Eyes:  Negative      ENT:  Negative      Respiratory:  Positive for dyspnea on exertion some orthopnea happening   Cardiovascular:    Positive for;lightheadedness tightness in chest when starting out walking, then loosens up after walking  Gastroenterology: Positive for reflux    Genitourinary:  not assessed      Musculoskeletal:  Positive for nocturnal cramping;foot pain    Neurologic:  Negative      Psychiatric:  Negative      Heme/Lymph/Imm:  Negative      Endocrine:  Negative        Physical Exam:  Vitals: /64   Pulse 61   Wt 72.1 kg (159 lb)   LMP  (LMP Unknown)   SpO2 99%   BMI 26.66 kg/m      Constitutional:  cooperative, alert and oriented, well developed, well nourished, in no acute distress;cooperative        Skin:  warm and dry to the touch, no apparent skin lesions or masses noted          Head:  normocephalic, no masses or lesions        Eyes:  pupils equal and round        Lymph:No Cervical lymphadenopathy present     ENT:  no pallor or cyanosis        Neck:  carotid pulses are full and equal bilaterally, JVP normal, no carotid bruit        Respiratory:  normal breath sounds, clear to auscultation, normal A-P diameter, normal symmetry, normal respiratory excursion, no use of accessory muscles         Cardiac: regular rhythm, normal S1/S2, no S3 or S4, apical impulse not displaced, no murmurs, gallops or rubs                pulses full and equal                                         GI:  not assessed this visit        Extremities and Muscular Skeletal:  no deformities, clubbing, cyanosis, erythema observed;no edema              Neurological:  no gross motor deficits;affect appropriate        Psych:  Alert and Oriented x 3        CC  KADEEM Ambriz CNP  [address] [place] [address]

## 2021-11-29 ENCOUNTER — OFFICE VISIT (OUTPATIENT)
Dept: INTERNAL MEDICINE | Facility: CLINIC | Age: 71
End: 2021-11-29
Payer: MEDICARE

## 2021-11-29 VITALS
DIASTOLIC BLOOD PRESSURE: 80 MMHG | RESPIRATION RATE: 16 BRPM | SYSTOLIC BLOOD PRESSURE: 122 MMHG | WEIGHT: 159 LBS | BODY MASS INDEX: 26.49 KG/M2 | OXYGEN SATURATION: 98 % | HEIGHT: 65 IN | TEMPERATURE: 96.7 F | HEART RATE: 72 BPM

## 2021-11-29 DIAGNOSIS — R73.01 IMPAIRED FASTING GLUCOSE: ICD-10-CM

## 2021-11-29 DIAGNOSIS — I20.9 ANGINAL SYNDROME (H): ICD-10-CM

## 2021-11-29 DIAGNOSIS — E78.00 PURE HYPERCHOLESTEROLEMIA: ICD-10-CM

## 2021-11-29 DIAGNOSIS — Z13.1 SCREENING FOR DIABETES MELLITUS: ICD-10-CM

## 2021-11-29 DIAGNOSIS — R21 RASH: ICD-10-CM

## 2021-11-29 DIAGNOSIS — K21.9 GASTROESOPHAGEAL REFLUX DISEASE WITHOUT ESOPHAGITIS: ICD-10-CM

## 2021-11-29 DIAGNOSIS — Z00.00 MEDICARE ANNUAL WELLNESS VISIT, SUBSEQUENT: Primary | ICD-10-CM

## 2021-11-29 DIAGNOSIS — I25.10 CORONARY ARTERY DISEASE INVOLVING NATIVE CORONARY ARTERY OF NATIVE HEART WITHOUT ANGINA PECTORIS: ICD-10-CM

## 2021-11-29 DIAGNOSIS — E55.9 VITAMIN D DEFICIENCY: ICD-10-CM

## 2021-11-29 LAB — HBA1C MFR BLD: 5.5 % (ref 0–5.6)

## 2021-11-29 PROCEDURE — 83036 HEMOGLOBIN GLYCOSYLATED A1C: CPT | Performed by: INTERNAL MEDICINE

## 2021-11-29 PROCEDURE — 36415 COLL VENOUS BLD VENIPUNCTURE: CPT | Performed by: INTERNAL MEDICINE

## 2021-11-29 PROCEDURE — 80061 LIPID PANEL: CPT | Performed by: INTERNAL MEDICINE

## 2021-11-29 PROCEDURE — G0439 PPPS, SUBSEQ VISIT: HCPCS | Performed by: INTERNAL MEDICINE

## 2021-11-29 PROCEDURE — 82306 VITAMIN D 25 HYDROXY: CPT | Performed by: INTERNAL MEDICINE

## 2021-11-29 PROCEDURE — 80053 COMPREHEN METABOLIC PANEL: CPT | Performed by: INTERNAL MEDICINE

## 2021-11-29 RX ORDER — ATORVASTATIN CALCIUM 40 MG/1
40 TABLET, FILM COATED ORAL DAILY
Qty: 90 TABLET | Refills: 3 | Status: SHIPPED | OUTPATIENT
Start: 2021-11-29 | End: 2021-12-02

## 2021-11-29 RX ORDER — METOPROLOL SUCCINATE 25 MG/1
25 TABLET, EXTENDED RELEASE ORAL DAILY
Qty: 90 TABLET | Refills: 3 | Status: SHIPPED | OUTPATIENT
Start: 2021-11-29 | End: 2022-02-18

## 2021-11-29 RX ORDER — OMEPRAZOLE 40 MG/1
40 CAPSULE, DELAYED RELEASE ORAL DAILY
Qty: 90 CAPSULE | Refills: 3 | Status: SHIPPED | OUTPATIENT
Start: 2021-11-29 | End: 2022-02-18

## 2021-11-29 ASSESSMENT — MIFFLIN-ST. JEOR: SCORE: 1233.13

## 2021-11-29 NOTE — PROGRESS NOTES
ASSESSMENT/PLAN                                                       (Z00.00) Medicare annual wellness visit, subsequent  (primary encounter diagnosis)  Comment: PMH, PSH, FH, SH, medications, allergies, immunizations, and preventative health measures reviewed and updated as appropriate.  Plan: see below for plans.      (E78.00) Pure hypercholesterolemia  (R73.01) Impaired fasting glucose  (Z13.1) Screening for diabetes mellitus  (E55.9) Vitamin D deficiency  Plan: fasting labs today; for now, continue present management; refills provided.     (R21) Rash  Plan: referred to dermatology for further evaluation - patient to schedule.     (K21.9) Gastroesophageal reflux disease without esophagitis  Comment: well-controlled on current regimen.    Plan: continue present management; refills provided.     (I25.10) Coronary artery disease involving native coronary artery of native heart without angina pectoris  Comment: well-controlled on current regimen.    Plan: continue present management; refills provided.     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    Appropriate preventive services were discussed with this patient, including applicable screening as appropriate for cardiovascular disease, diabetes, osteopenia/osteoporosis, and glaucoma.  As appropriate for age/gender, discussed screening for colorectal cancer, prostate cancer, breast cancer, and cervical cancer. Checklist reviewing preventive services available has been given to the patient.    Reviewed patients plan of care. The Basic Care Plan (routine screening as documented in Health Maintenance) for Debby Chan meets the Care Plan requirement. This Care Plan has been established and reviewed with the Patient.    Ida Lyles MD   [place] [address] [address] [place] [address]  [place] [address]  T: [phone], F: [phone]    SUBJECTIVE                                                      Debby Chan is a very pleasant 71 year old female who presents for her subsequent AWV:    Current providers (other than myself): Mario    PMH, PSH, , , medications, allergies, immunizations, preventative health, and health risk assessment reviewed and updated as appropriate.    Past Medical History:   Diagnosis Date     Acid reflux disease      CAD (coronary artery disease)      Impaired fasting glucose      Pure hypercholesterolemia      Past Surgical History:   Procedure Laterality Date     BREAST BIOPSY, RT/LT Right 1985    benign fibroadenoma     CV CORONARY ANGIOGRAM N/A 3/10/2020    Procedure: Coronary Angiogram;  Surgeon: Daniel Huffman MD;  Location:  [facility]     CV FRACTIONAL FLOW RATIO WIRE N/A 3/10/2020    Procedure: Fractional Flow Reserve;  Surgeon: Daniel Huffman MD;  Location:  [facility]     CV INSTANTANEOUS WAVE-FREE RATIO N/A 3/10/2020    Procedure: Instantaneous Wave-Free Ratio;  Surgeon: Daniel Huffman MD;  Location:  [facility]     CV LEFT HEART CATH N/A 3/10/2020    Procedure: Left Heart Cath;  Surgeon: Daniel Huffman MD;  Location:  [facility]     CV LEFT VENTRICULOGRAM N/A 3/10/2020    Procedure: Left Ventriculogram;  Surgeon: Daniel Huffman MD;  Location:  [facility]     ESOPHAGOSCOPY, GASTROSCOPY, DUODENOSCOPY (EGD), COMBINED N/A 12/21/2017    Procedure: COMBINED ESOPHAGOSCOPY, GASTROSCOPY, DUODENOSCOPY (EGD), BIOPSY SINGLE OR MULTIPLE;  gastroscopy;  Surgeon: Srinivasa Jackson MD;  Location:  GI     TUBAL LIGATION  1990     Family History   Problem Relation Age of Onset     Myocardial Infarction Father         later in life; s/p PCI     Prostate Cancer Father      Diabetes Type 2  Father      Skin Cancer Father      Heart Failure Mother      Uterine Cancer  Paternal Aunt      Melanoma Sister      Cerebrovascular Disease No family hx of      Coronary Artery Disease Early Onset No family hx of      Ovarian Cancer No family hx of      Colon Cancer No family hx of      Social History     Occupational History     Occupation: Retired from [employer]   Tobacco Use     Smoking status: Never Smoker     Smokeless tobacco: Never Used   Substance and Sexual Activity     Alcohol use: Yes     Comment: occasional     Drug use: No     Sexual activity: Not Currently   Social History Narrative    .    No kids.    No formal exercise, but stays active.      Allergies   Allergen Reactions     Penicillins Nausea     Current Outpatient Medications   Medication Sig     Ascorbic Acid (VITAMIN C) 500 MG CAPS      aspirin 81 MG EC tablet Take 1 tablet (81 mg) by mouth daily     atorvastatin (LIPITOR) 40 MG tablet Take 1 tablet (40 mg) by mouth daily     Calcium Carbonate-Vitamin D (CALCIUM + D PO) Take  by mouth. Plus magnesium     Cholecalciferol (VITAMIN D) 2000 UNITS tablet Take 1 tablet by mouth daily.     metoprolol succinate ER (TOPROL-XL) 25 MG 24 hr tablet Take 1 tablet (25 mg) by mouth daily     omeprazole (PRILOSEC) 40 MG DR capsule Take 1 capsule (40 mg) by mouth daily     triamcinolone (KENALOG) 0.025 % cream Apply topically 2 times daily A thin layer to eyelids x 1-2 weeks.     triamcinolone (KENALOG) 0.1 % external ointment Apply sparingly to affected area three times daily for 14 days.     UNABLE TO FIND Instaflex for joints     Immunization History   Administered Date(s) Administered     COVID-19,PF,Moderna 03/01/2021, 03/29/2021, 10/27/2021     DTP-Hib 10/12/1993, 12/13/1993, 03/01/1994     Flu 65+ Years 10/15/2021     Hep B, Peds or Adolescent 08/19/1993, 10/12/1993, 03/01/1994     Hib (PRP-T) 11/08/1994     Historical DTP/aP 02/14/1995, 08/27/1998     Influenza (High Dose) 3 valent vaccine 10/04/2016, 10/17/2017, 10/23/2018, 11/12/2019, 10/06/2020     Influenza (IIV3) PF  "12/03/2010     MMR 11/08/1994, 08/27/1998     OPV, trivalent, live 10/12/1993, 12/15/1993, 02/14/1995, 08/27/1998     Pneumo Conj 13-V (2010&after) 08/07/2015     Pneumococcal 23 valent 10/04/2016     TDAP Vaccine (Adacel) 05/31/2012     Varicella 10/11/1995     PREVENTATIVE HEALTH                                                      BMI: within normal limits   Blood pressure: well-controlled on current regimen   Breast CA screening: up to date   Colon CA screening: up to date   Lung CA screening: n/a   Dexa: up to date   Screening cholesterol: n/a - already being treated for this condition  Screening diabetes: DUE  Alcohol misuse screening: alcohol use reviewed - no intervention indicated at this time  Immunizations: reviewed; Shingrix series DUE - not covered by Medicare (may obtain in pharmacy if desired)     HEALTH RISK ASSESSMENT                                                      In general, how would you rate your overall physical health? very good  Outside of work, how many days during the week do you exercise? 4-5 days/week  Outside of work, approximately how many minutes a day do you exercise? 15-30 minutes    If you drink alcohol do you typically have >3 drinks per day or >7 drinks per week? No  Do you usually eat at least 4 servings of fruit and vegetables a day, include whole grains & fiber and avoid regularly eating high fat or \"junk\" foods? Yes     Do you have any problems taking medications regularly? No  Do you have any side effects from medications? No    Assistance with daily activities: No    Safety concerns: No    Fall risk assessment: completed today (see ambulatory assessments)    Hearing concerns: No    In the past 6 months, have you been bothered by leaking of urine: No    In general, how would you rate your overall mental or emotional health: very good    PHQ-2/PHQ-9 assessment: completed today (see ambulatory assessments)    Additional concerns today: YES - rash on left lower jaw, left " "neck, and left finger - no improvement with topical steroids    OBJECTIVE                                                      /80 (BP Location: Left arm, Patient Position: Chair, Cuff Size: Adult Regular)   Pulse 72   Temp (!) 96.7  F (35.9  C) (Temporal)   Resp 16   Ht 1.645 m (5' 4.75\")   Wt 72.1 kg (159 lb)   LMP  (LMP Unknown)   SpO2 98%   BMI 26.66 kg/m    Constitutional: well-appearing  Head, Ears, and Eyes: normocephalic; normal external auditory canal and pinna; tympanic membranes visualized and normal; normal lids and conjunctivae  Neck: supple, symmetric, no thyromegaly or lymphadenopathy  Respiratory: normal respiratory effort; clear to auscultation bilaterally  Cardiovascular: regular rate and rhythm; no edema  Gastrointestinal: soft, non-tender, and non-distended; no organomegaly or masses  Musculoskeletal: normal gait and station  Psych: normal judgment and insight; normal mood and affect; recent and remote memory intact    Cognitive impairment noted: No  ---  (Note was completed, in part, with [employer] voice-recognition software. Documentation was reviewed, but some grammatical, spelling, and word errors may remain.)    "

## 2021-11-30 LAB
ALBUMIN SERPL-MCNC: 3.9 G/DL (ref 3.4–5)
ALP SERPL-CCNC: 82 U/L (ref 40–150)
ALT SERPL W P-5'-P-CCNC: 38 U/L (ref 0–50)
ANION GAP SERPL CALCULATED.3IONS-SCNC: 9 MMOL/L (ref 3–14)
AST SERPL W P-5'-P-CCNC: 23 U/L (ref 0–45)
BILIRUB SERPL-MCNC: 0.5 MG/DL (ref 0.2–1.3)
BUN SERPL-MCNC: 20 MG/DL (ref 7–30)
CALCIUM SERPL-MCNC: 9.5 MG/DL (ref 8.5–10.1)
CHLORIDE BLD-SCNC: 109 MMOL/L (ref 94–109)
CHOLEST SERPL-MCNC: 156 MG/DL
CO2 SERPL-SCNC: 23 MMOL/L (ref 20–32)
CREAT SERPL-MCNC: 0.96 MG/DL (ref 0.52–1.04)
DEPRECATED CALCIDIOL+CALCIFEROL SERPL-MC: 44 UG/L (ref 20–75)
FASTING STATUS PATIENT QL REPORTED: NO
GFR SERPL CREATININE-BSD FRML MDRD: 60 ML/MIN/1.73M2
GLUCOSE BLD-MCNC: 86 MG/DL (ref 70–99)
HDLC SERPL-MCNC: 64 MG/DL
LDLC SERPL CALC-MCNC: 68 MG/DL
NONHDLC SERPL-MCNC: 92 MG/DL
POTASSIUM BLD-SCNC: 4.6 MMOL/L (ref 3.4–5.3)
PROT SERPL-MCNC: 7.4 G/DL (ref 6.8–8.8)
SODIUM SERPL-SCNC: 141 MMOL/L (ref 133–144)
TRIGL SERPL-MCNC: 120 MG/DL

## 2021-12-08 ENCOUNTER — HOSPITAL ENCOUNTER (OUTPATIENT)
Dept: CARDIOLOGY | Facility: CLINIC | Age: 71
Discharge: HOME | End: 2021-12-08
Attending: INTERNAL MEDICINE | Admitting: INTERNAL MEDICINE
Payer: MEDICARE

## 2021-12-08 DIAGNOSIS — I20.89 OTHER FORMS OF ANGINA PECTORIS (H): ICD-10-CM

## 2021-12-08 DIAGNOSIS — I25.10 CORONARY ARTERY DISEASE INVOLVING NATIVE CORONARY ARTERY OF NATIVE HEART WITHOUT ANGINA PECTORIS: ICD-10-CM

## 2021-12-08 DIAGNOSIS — E78.5 HYPERLIPIDEMIA LDL GOAL <70: ICD-10-CM

## 2021-12-08 PROCEDURE — 93018 CV STRESS TEST I&R ONLY: CPT | Performed by: INTERNAL MEDICINE

## 2021-12-08 PROCEDURE — 93325 DOPPLER ECHO COLOR FLOW MAPG: CPT | Mod: 26 | Performed by: INTERNAL MEDICINE

## 2021-12-08 PROCEDURE — 93325 DOPPLER ECHO COLOR FLOW MAPG: CPT | Mod: TC

## 2021-12-08 PROCEDURE — 255N000002 HC RX 255 OP 636: Performed by: INTERNAL MEDICINE

## 2021-12-08 PROCEDURE — 93016 CV STRESS TEST SUPVJ ONLY: CPT | Performed by: INTERNAL MEDICINE

## 2021-12-08 PROCEDURE — 93350 STRESS TTE ONLY: CPT | Mod: 26 | Performed by: INTERNAL MEDICINE

## 2021-12-08 PROCEDURE — C8928 TTE W OR W/O FOL W/CON,STRES: HCPCS

## 2021-12-08 PROCEDURE — 93321 DOPPLER ECHO F-UP/LMTD STD: CPT | Mod: 26 | Performed by: INTERNAL MEDICINE

## 2021-12-08 RX ADMIN — HUMAN ALBUMIN MICROSPHERES AND PERFLUTREN 9 ML: 10; .22 INJECTION, SOLUTION INTRAVENOUS at 11:23

## 2021-12-09 ENCOUNTER — TELEPHONE (OUTPATIENT)
Dept: CARDIOLOGY | Facility: CLINIC | Age: 71
End: 2021-12-09
Payer: MEDICARE

## 2021-12-09 DIAGNOSIS — R94.39 ABNORMAL STRESS TEST: ICD-10-CM

## 2021-12-09 DIAGNOSIS — I25.10 CORONARY ARTERY DISEASE INVOLVING NATIVE CORONARY ARTERY OF NATIVE HEART WITHOUT ANGINA PECTORIS: Primary | ICD-10-CM

## 2021-12-09 NOTE — TELEPHONE ENCOUNTER
"Reviewed stress echo showing   This was a normal stress echocardiogram with no evidence of stress-induced ischemia.  This was an abnormal stress EKG.  The patient exhibited no chest pain during exercise.    Per office note dated 11/22/21, Dr. Martin recommended, \"We will obtain a stress echocardiogram to determine if there is any evidence of ischemia.  The patient typically has nonspecific EKG changes.  It is the imaging portion that we are interested in stable condition.\"     Will message Dr. Martin to review. Eliseo GRIFFIN   "

## 2021-12-10 NOTE — TELEPHONE ENCOUNTER
Abnormal stress EKG portion of stress echo and did not reach 6 minutes of exercise which can underestimate the degree of ischemia. Has a 60% lesion on LAD which will almost certainly be overcalled on CTA of cors. Would get lexiscan at [facility] . [facility]

## 2021-12-10 NOTE — TELEPHONE ENCOUNTER
Called pt with results of stress echo & recommendations from Dr. Martin. Order in chart & pt transferred to scheduling to arrange. Eliseo GRIFFIN

## 2021-12-16 ENCOUNTER — TELEPHONE (OUTPATIENT)
Dept: CARDIOLOGY | Facility: CLINIC | Age: 71
End: 2021-12-16
Payer: MEDICARE

## 2021-12-16 NOTE — TELEPHONE ENCOUNTER
Received call from pt stating that her ophthalmologist has started her on a Romaine-Poly-Dex Ophthalmic Suspension for her eyes as she is having some allergies. She is also asking if she can take Benadryl & Cortisone. Pt advised OK to take these prior to the lexiscan. Eliseo GRIFFIN

## 2021-12-21 ENCOUNTER — HOSPITAL ENCOUNTER (OUTPATIENT)
Dept: CARDIOLOGY | Facility: CLINIC | Age: 71
Setting detail: NUCLEAR MEDICINE
End: 2021-12-21
Attending: INTERNAL MEDICINE
Payer: MEDICARE

## 2021-12-21 ENCOUNTER — HOSPITAL ENCOUNTER (OUTPATIENT)
Dept: CARDIOLOGY | Facility: CLINIC | Age: 71
End: 2021-12-21
Attending: INTERNAL MEDICINE
Payer: MEDICARE

## 2021-12-21 VITALS
DIASTOLIC BLOOD PRESSURE: 78 MMHG | WEIGHT: 160.8 LBS | OXYGEN SATURATION: 97 % | HEART RATE: 71 BPM | SYSTOLIC BLOOD PRESSURE: 128 MMHG | HEIGHT: 65 IN | BODY MASS INDEX: 26.79 KG/M2

## 2021-12-21 DIAGNOSIS — I25.10 CORONARY ARTERY DISEASE INVOLVING NATIVE CORONARY ARTERY OF NATIVE HEART WITHOUT ANGINA PECTORIS: ICD-10-CM

## 2021-12-21 DIAGNOSIS — R94.39 ABNORMAL STRESS TEST: ICD-10-CM

## 2021-12-21 LAB
CV STRESS MAX HR HE: 93
NUC STRESS EJECTION FRACTION: 76 %
RATE PRESSURE PRODUCT: NORMAL
STRESS ECHO BASELINE DIASTOLIC HE: 78
STRESS ECHO BASELINE HR: 71 BPM
STRESS ECHO BASELINE SYSTOLIC BP: 128
STRESS ECHO CALCULATED PERCENT HR: 62 %
STRESS ECHO LAST STRESS DIASTOLIC BP: 72
STRESS ECHO LAST STRESS SYSTOLIC BP: 160
STRESS ECHO TARGET HR: 149
STRESS ST DEPRESSION: 0.5 MM

## 2021-12-21 PROCEDURE — G1004 CDSM NDSC: HCPCS

## 2021-12-21 PROCEDURE — A9502 TC99M TETROFOSMIN: HCPCS | Performed by: INTERNAL MEDICINE

## 2021-12-21 PROCEDURE — 93016 CV STRESS TEST SUPVJ ONLY: CPT | Performed by: INTERNAL MEDICINE

## 2021-12-21 PROCEDURE — 343N000001 HC RX 343: Performed by: INTERNAL MEDICINE

## 2021-12-21 PROCEDURE — 93018 CV STRESS TEST I&R ONLY: CPT | Mod: MG | Performed by: INTERNAL MEDICINE

## 2021-12-21 PROCEDURE — G1004 CDSM NDSC: HCPCS | Performed by: INTERNAL MEDICINE

## 2021-12-21 PROCEDURE — 250N000011 HC RX IP 250 OP 636: Performed by: INTERNAL MEDICINE

## 2021-12-21 PROCEDURE — 78452 HT MUSCLE IMAGE SPECT MULT: CPT | Mod: 26 | Performed by: INTERNAL MEDICINE

## 2021-12-21 RX ORDER — AMINOPHYLLINE 25 MG/ML
50-100 INJECTION, SOLUTION INTRAVENOUS
Status: DISCONTINUED | OUTPATIENT
Start: 2021-12-21 | End: 2021-12-22 | Stop reason: HOSPADM

## 2021-12-21 RX ORDER — REGADENOSON 0.08 MG/ML
0.4 INJECTION, SOLUTION INTRAVENOUS ONCE
Status: COMPLETED | OUTPATIENT
Start: 2021-12-21 | End: 2021-12-21

## 2021-12-21 RX ORDER — CAFFEINE CITRATE 20 MG/ML
60 SOLUTION INTRAVENOUS
Status: DISCONTINUED | OUTPATIENT
Start: 2021-12-21 | End: 2021-12-22 | Stop reason: HOSPADM

## 2021-12-21 RX ORDER — ALBUTEROL SULFATE 90 UG/1
2 AEROSOL, METERED RESPIRATORY (INHALATION) EVERY 5 MIN PRN
Status: DISCONTINUED | OUTPATIENT
Start: 2021-12-21 | End: 2021-12-22 | Stop reason: HOSPADM

## 2021-12-21 RX ORDER — ACYCLOVIR 200 MG/1
0-1 CAPSULE ORAL
Status: DISCONTINUED | OUTPATIENT
Start: 2021-12-21 | End: 2021-12-22 | Stop reason: HOSPADM

## 2021-12-21 RX ADMIN — REGADENOSON 0.4 MG: 0.08 INJECTION, SOLUTION INTRAVENOUS at 10:41

## 2021-12-21 RX ADMIN — TETROFOSMIN 9.62 MCI.: 1.38 INJECTION, POWDER, LYOPHILIZED, FOR SOLUTION INTRAVENOUS at 10:44

## 2021-12-21 RX ADMIN — TETROFOSMIN 3.43 MCI.: 1.38 INJECTION, POWDER, LYOPHILIZED, FOR SOLUTION INTRAVENOUS at 09:17

## 2021-12-21 ASSESSMENT — MIFFLIN-ST. JEOR: SCORE: 1245.26

## 2021-12-22 NOTE — TELEPHONE ENCOUNTER
Reviewed lexiscan showing      The nuclear stress test is probably negative for inducible myocardial ischemia or infarction.     Left ventricular function is hyperdynamic.     The left ventricular ejection fraction at stress is 76%.     A prior study was conducted on 1/31/2013.  This study has no change when compared with the prior study.    Will message Dr. Martin to review. Eliseo GRIFFIN

## 2021-12-23 NOTE — TELEPHONE ENCOUNTER
Attempted to call pt with results & recommendations, left message for pt to call back. Eliseo GRIFFIN

## 2022-01-25 ENCOUNTER — OFFICE VISIT (OUTPATIENT)
Dept: DERMATOLOGY | Facility: CLINIC | Age: 72
End: 2022-01-25
Payer: MEDICARE

## 2022-01-25 VITALS — DIASTOLIC BLOOD PRESSURE: 71 MMHG | OXYGEN SATURATION: 98 % | SYSTOLIC BLOOD PRESSURE: 140 MMHG | HEART RATE: 69 BPM

## 2022-01-25 DIAGNOSIS — L82.1 SEBORRHEIC KERATOSIS: ICD-10-CM

## 2022-01-25 DIAGNOSIS — L81.4 LENTIGO: ICD-10-CM

## 2022-01-25 DIAGNOSIS — D18.01 ANGIOMA OF SKIN: ICD-10-CM

## 2022-01-25 DIAGNOSIS — D22.9 NEVUS: ICD-10-CM

## 2022-01-25 DIAGNOSIS — L30.0 NUMMULAR DERMATITIS: Primary | ICD-10-CM

## 2022-01-25 PROCEDURE — 99214 OFFICE O/P EST MOD 30 MIN: CPT | Performed by: PHYSICIAN ASSISTANT

## 2022-01-25 RX ORDER — MULTIVIT WITH MINERALS/LUTEIN
1000 TABLET ORAL DAILY
COMMUNITY

## 2022-01-25 RX ORDER — FLUOCINONIDE TOPICAL SOLUTION USP, 0.05% 0.5 MG/ML
SOLUTION TOPICAL
Qty: 50 ML | Refills: 3 | Status: SHIPPED | OUTPATIENT
Start: 2022-01-25 | End: 2022-12-02

## 2022-01-25 RX ORDER — BETAMETHASONE DIPROPIONATE 0.5 MG/G
CREAM TOPICAL
Qty: 50 G | Refills: 2 | Status: SHIPPED | OUTPATIENT
Start: 2022-01-25 | End: 2022-12-02

## 2022-01-25 NOTE — LETTER
1/25/2022         RE: Debby Chan  [address]  [facility] [address]        Dear Colleague,    Thank you for referring your patient, Debby Chan, to the [facility]. Please see a copy of my visit note below.    HPI:   Chief complaints: Debby Chan is a pleasant 71 year old female who presents for Full skin cancer screening to rule out skin cancer   Last Skin Exam: 3 years ago      1st Baseline: no  Personal HX of Skin Cancer: no   Personal HX of Malignant Melanoma: no   Family HX of Skin Cancer / Malignant Melanoma: Yes family members with skin CA; sister with melanoma  Personal HX of Atypical Moles:   no  Risk factors: history of sun exposure and burns  New / Changing lesions:yes a persistent rash on the arms and face  Social History:   On review of systems, there are no further skin complaints, patient is feeling otherwise well.   ROS of the following were done and are negative: Constitutional, Eyes, Ears, Nose,   Mouth, Throat, Cardiovascular, Respiratory, GI, Genitourinary, Musculoskeletal,   Psychiatric, Endocrine, Allergic/Immunologic.    PHYSICAL EXAM:   BP (!) 140/71   Pulse 69   LMP  (LMP Unknown)   SpO2 98%   Skin exam performed as follows: Type 2 skin. Mood appropriate  Alert and Oriented X 3. Well developed, well nourished in no distress.  General appearance: Normal  Head including face: Normal  Eyes: conjunctiva and lids: Normal  Mouth: Lips, teeth, gums: Normal  Neck: Normal  Chest-breast/axillae: Normal  Back: Normal  Spleen and liver: Normal  Cardiovascular: Exam of peripheral vascular system by observation for swelling, varicosities, edema: Normal  Genitalia: groin, buttocks: Normal  Extremities: digits/nails (clubbing): Normal  Eccrine and Apocrine glands: Normal  Right upper extremity: Normal  Left upper extremity: Normal  Right lower extremity: Normal  Left lower extremity: Normal  Skin: Scalp and body hair: See  below    Pt deferred exam of breasts, groin, buttocks: No    Other physical findings:  1. Multiple pigmented macules on extremities and trunk  2. Multiple pigmented macules on face, trunk and extremities  3. Multiple vascular papules on trunk, arms and legs  4. Multiple scattered keratotic plaques  5. Annular plaques on the arms; scaling along the hairline and scalp       Except as noted above, no other signs of skin cancer or melanoma.     ASSESSMENT/PLAN:   Benign Full skin cancer screening today. . Patient with history of none  Advised on monthly self exams and 1 year  Patient Education: Appropriate brochures given.    1. Multiple benign appearing melanocytic nevi on arms, legs and trunk. Discussed ABCDEs of melanoma and sunscreen.   2. Multiple lentigos on arms, legs and trunk. Advised benign, no treatment needed.  3. Multiple scattered angiomas. Advised benign, no treatment needed.   4. Seborrheic keratosis on arms, legs and trunk. Advised benign, no treatment needed.  5. Nummular dermatitis on the arms and scalp   --Start betamethasone cream BID x 1-2 weeks to AA on the body  --Start lidex to the scalp BID x 1-2 weeks then PRN  --Thick emollients daily            Follow-up: yearly    1.) Patient was asked about new and changing moles. YES  2.) Patient received a complete physical skin examination: YES  3.) Patient was counseled to perform a monthly self skin examination: YES  Scribed By: Charleen Mendoza, MS, PADAVID          Again, thank you for allowing me to participate in the care of your patient.        Sincerely,        Charleen Mendoza PA-C

## 2022-01-25 NOTE — PATIENT INSTRUCTIONS
For the dry skin:  Use Cetaphil cream, CeraVe cream, Vanicream, Eucerin cream or Gold Bond - use these every day right after the shower    For the scalp, apply fluocinonide solution to itchy areas twice per day as needed for 1-2 weeks at a time.     For the red/itchy areas on the body, apply betamethasone cream twice per day for 1-2 weeks when needed. This also works great for bug bites.           WOUND CARE INSTRUCTIONS   FOR CRYOSURGERY   This area treated with liquid nitrogen should form a blister (areas treated may or may not blister-skin may just turn dark and slough off). You do not need to bandage the area unless a blister forms and breaks (which may be a few days). When the blister breaks, begin daily dressing changes as follows:  1) Clean and dry the area with tap water using clean Q-tip or sterile gauze pad.   2) Apply Polysporin ointment or Bacitracin ointment over entire wound. Do NOT use Neosporin ointment.   3) Cover the wound with a band-aid or sterile non-stick gauze pad and micropore paper tape.   REPEAT THESE INSTRUCTIONS AT LEAST ONCE A DAY UNTIL THE WOUND HAS COMPLETELY HEALED.   It is an old wives tale that a wound heals better when it is exposed to air and allowed to dry out. The wound will heal faster with a better cosmetic result if it is kept moist with ointment and covered with a bandage.   Do not let the wound dry out.   IMPORTANT INFORMATION ON REVERSE SIDE   Supplies Needed:   *Cotton tipped applicators (Q-tips)   *Polysporin ointment or Bacitracin ointment (NOT NEOSPORIN)   *Band-aids, or non stick gauze pads and micropore paper tape   PATIENT INFORMATION   During the healing process you will notice a number of changes. All wounds develop a small halo of redness surrounding the wound. This means healing is occurring. Severe itching with extensive redness usually indicates sensitivity to the ointment or bandage tape used to dress the wound. You should call our office if this develops.    Swelling and/or discoloration around your surgical site is common, particularly when performed around the eye.   All wounds normally drain. The larger the wound the more drainage there will be. After 7-10 days, you will notice the wound beginning to shrink and new skin will begin to grow. The wound is healed when you can see skin has formed over the entire area. A healed wound has a healthy, shiny look to the surface and is red to dark pink in color to normalize. Wounds may take approximately 4-6 weeks to heal. Larger wounds may take 6-8 weeks. After the wound is healed you may discontinue dressing changes.   You may experience a sensation of tightness as your wound heals. This is normal and will gradually subside.   Your healed wound may be sensitive to temperature changes. This sensitivity improves with time, but if you re having a lot of discomfort, try to avoid temperature extremes.   Patients frequently experience itching after their wound appears to have healed because of the continue healing under the skin. Plain Vaseline will help relieve the itching.

## 2022-02-17 DIAGNOSIS — K21.9 GASTROESOPHAGEAL REFLUX DISEASE WITHOUT ESOPHAGITIS: ICD-10-CM

## 2022-02-17 DIAGNOSIS — I25.10 CORONARY ARTERY DISEASE INVOLVING NATIVE CORONARY ARTERY OF NATIVE HEART WITHOUT ANGINA PECTORIS: ICD-10-CM

## 2022-02-18 RX ORDER — OMEPRAZOLE 40 MG/1
CAPSULE, DELAYED RELEASE ORAL
Qty: 90 CAPSULE | Refills: 2 | Status: SHIPPED | OUTPATIENT
Start: 2022-02-18 | End: 2022-12-02

## 2022-02-18 RX ORDER — METOPROLOL SUCCINATE 25 MG/1
TABLET, EXTENDED RELEASE ORAL
Qty: 90 TABLET | Refills: 2 | Status: SHIPPED | OUTPATIENT
Start: 2022-02-18 | End: 2022-12-02

## 2022-07-18 ENCOUNTER — OFFICE VISIT (OUTPATIENT)
Dept: URGENT CARE | Facility: URGENT CARE | Age: 72
End: 2022-07-18
Payer: MEDICARE

## 2022-07-18 VITALS
SYSTOLIC BLOOD PRESSURE: 147 MMHG | RESPIRATION RATE: 16 BRPM | OXYGEN SATURATION: 97 % | HEART RATE: 71 BPM | WEIGHT: 159 LBS | BODY MASS INDEX: 26.46 KG/M2 | DIASTOLIC BLOOD PRESSURE: 76 MMHG

## 2022-07-18 DIAGNOSIS — M54.2 NECK PAIN ON LEFT SIDE: Primary | ICD-10-CM

## 2022-07-18 PROCEDURE — 99213 OFFICE O/P EST LOW 20 MIN: CPT | Performed by: FAMILY MEDICINE

## 2022-07-18 RX ORDER — METHYLPREDNISOLONE 4 MG
TABLET, DOSE PACK ORAL
Qty: 21 TABLET | Refills: 0 | Status: SHIPPED | OUTPATIENT
Start: 2022-07-18 | End: 2022-12-02

## 2022-07-18 RX ORDER — METHOCARBAMOL 750 MG/1
750 TABLET, FILM COATED ORAL 4 TIMES DAILY
Qty: 28 TABLET | Refills: 0 | Status: SHIPPED | OUTPATIENT
Start: 2022-07-18 | End: 2022-07-25

## 2022-07-18 NOTE — PROGRESS NOTES
SUBJECTIVE:  Chief Complaint   Patient presents with     Neck Pain     73 yo  presents with the following complaint onset 2week went away last week came back  feels as though swelling from base of head to shoulder tx- motrin last dose 1 hr ago, ice   .ident who presents with a chief complaint of  left neck pain.  Symptoms began 2 week(s) ago , are moderate and severe andstill present.  Context:Injury: no  Resolved and then went away and returned    Past Medical History:   Diagnosis Date     Acid reflux disease      CAD (coronary artery disease)      Impaired fasting glucose      Pure hypercholesterolemia        Past Surgical History:   Procedure Laterality Date     BREAST BIOPSY, RT/LT Right 1985    benign fibroadenoma     CV CORONARY ANGIOGRAM N/A 3/10/2020    Procedure: Coronary Angiogram;  Surgeon: Daniel Huffman MD;  Location:  [facility]ATH [facility]     CV FRACTIONAL FLOW RATIO WIRE N/A 3/10/2020    Procedure: Fractional Flow Reserve;  Surgeon: Daniel Huffman MD;  Location:  [facility]     CV INSTANTANEOUS WAVE-FREE RATIO N/A 3/10/2020    Procedure: Instantaneous Wave-Free Ratio;  Surgeon: Daniel Huffman MD;  Location:  [facility]     CV LEFT HEART CATH N/A 3/10/2020    Procedure: Left Heart Cath;  Surgeon: Daniel Huffman MD;  Location:  [facility]     CV LEFT VENTRICULOGRAM N/A 3/10/2020    Procedure: Left Ventriculogram;  Surgeon: Daniel Huffman MD;  Location:  [facility]ATH [facility]     ESOPHAGOSCOPY, GASTROSCOPY, DUODENOSCOPY (EGD), COMBINED N/A 12/21/2017    Procedure: COMBINED ESOPHAGOSCOPY, GASTROSCOPY, DUODENOSCOPY (EGD), BIOPSY SINGLE OR MULTIPLE;  gastroscopy;  Surgeon: Srinivasa Jackson MD;  Location:  GI     TUBAL LIGATION  1990       Family History   Problem Relation Age of Onset     Myocardial Infarction Father         later in life; s/p PCI     Prostate Cancer Father      Diabetes Type 2  Father      Skin Cancer Father       Heart Failure Mother      Uterine Cancer Paternal Aunt      Melanoma Sister      Cerebrovascular Disease No family hx of      Coronary Artery Disease Early Onset No family hx of      Ovarian Cancer No family hx of      Colon Cancer No family hx of        Social History     Tobacco Use     Smoking status: Never Smoker     Smokeless tobacco: Never Used   Substance Use Topics     Alcohol use: Yes     Comment: occasional       ROS:CONSTITUTIONAL:NEGATIVE for fever, chills, change in weight  INTEGUMENTARY/SKIN: NEGATIVE for worrisome rashes, moles or lesions  RESP:NEGATIVE for significant cough or SOB  CV: NEGATIVE for chest pain, palpitations or peripheral edema    EXAM: BP (!) 147/76   Pulse 71   Resp 16   Wt 72.1 kg (159 lb)   LMP  (LMP Unknown)   SpO2 97%   BMI 26.46 kg/m    Exam:neck  tenderness to palpation and pain with rom  GENERAL APPEARANCE: healthy, alert and no distress  EXTREMITIES: peripheral pulses normal  SKIN: no suspicious lesions or rashes  NEURO: Normal strength and tone, sensory exam grossly normal, mentation intact and speech normal    X-RAY straightening of c spine    ASSESSMENT:     ICD-10-CM    1. Neck pain on left side  M54.2 XR Cervical Spine 2/3 Views     methocarbamol (ROBAXIN) 750 MG tablet     methylPREDNISolone (MEDROL DOSEPAK) 4 MG tablet therapy pack     Heat  OTC tylenol

## 2022-10-09 ENCOUNTER — HEALTH MAINTENANCE LETTER (OUTPATIENT)
Age: 72
End: 2022-10-09

## 2022-11-25 ASSESSMENT — ENCOUNTER SYMPTOMS
DIARRHEA: 0
WEAKNESS: 0
PALPITATIONS: 0
HEMATOCHEZIA: 0
HEADACHES: 1
DYSURIA: 1
NAUSEA: 0
CHILLS: 0
ARTHRALGIAS: 1
FREQUENCY: 0
EYE PAIN: 0
HEMATURIA: 0
JOINT SWELLING: 0
HEARTBURN: 1
ABDOMINAL PAIN: 0
NERVOUS/ANXIOUS: 0
SHORTNESS OF BREATH: 1
SORE THROAT: 0
DIZZINESS: 0
CONSTIPATION: 0
BREAST MASS: 0
FEVER: 0
MYALGIAS: 0
PARESTHESIAS: 0
COUGH: 0

## 2022-11-25 ASSESSMENT — ACTIVITIES OF DAILY LIVING (ADL): CURRENT_FUNCTION: NO ASSISTANCE NEEDED

## 2022-11-30 ENCOUNTER — LAB (OUTPATIENT)
Dept: LAB | Facility: CLINIC | Age: 72
End: 2022-11-30
Payer: MEDICARE

## 2022-11-30 DIAGNOSIS — E78.5 HYPERLIPIDEMIA LDL GOAL <70: ICD-10-CM

## 2022-11-30 LAB
ALT SERPL W P-5'-P-CCNC: 23 U/L (ref 10–35)
CHOLEST SERPL-MCNC: 161 MG/DL
HDLC SERPL-MCNC: 62 MG/DL
LDLC SERPL CALC-MCNC: 64 MG/DL
NONHDLC SERPL-MCNC: 99 MG/DL
TRIGL SERPL-MCNC: 174 MG/DL

## 2022-11-30 PROCEDURE — 80048 BASIC METABOLIC PNL TOTAL CA: CPT

## 2022-11-30 PROCEDURE — 84460 ALANINE AMINO (ALT) (SGPT): CPT

## 2022-11-30 PROCEDURE — 36415 COLL VENOUS BLD VENIPUNCTURE: CPT

## 2022-11-30 PROCEDURE — 80061 LIPID PANEL: CPT

## 2022-12-02 ENCOUNTER — OFFICE VISIT (OUTPATIENT)
Dept: INTERNAL MEDICINE | Facility: CLINIC | Age: 72
End: 2022-12-02
Payer: MEDICARE

## 2022-12-02 VITALS
WEIGHT: 161.3 LBS | HEIGHT: 65 IN | HEART RATE: 61 BPM | BODY MASS INDEX: 26.87 KG/M2 | DIASTOLIC BLOOD PRESSURE: 70 MMHG | SYSTOLIC BLOOD PRESSURE: 140 MMHG | OXYGEN SATURATION: 98 %

## 2022-12-02 DIAGNOSIS — K21.9 GASTROESOPHAGEAL REFLUX DISEASE WITHOUT ESOPHAGITIS: ICD-10-CM

## 2022-12-02 DIAGNOSIS — Z00.00 ENCOUNTER FOR MEDICARE ANNUAL WELLNESS EXAM: Primary | ICD-10-CM

## 2022-12-02 DIAGNOSIS — I10 BENIGN ESSENTIAL HYPERTENSION: ICD-10-CM

## 2022-12-02 DIAGNOSIS — E78.00 PURE HYPERCHOLESTEROLEMIA: ICD-10-CM

## 2022-12-02 DIAGNOSIS — I25.10 CORONARY ARTERY DISEASE INVOLVING NATIVE CORONARY ARTERY OF NATIVE HEART WITHOUT ANGINA PECTORIS: ICD-10-CM

## 2022-12-02 LAB
ANION GAP SERPL CALCULATED.3IONS-SCNC: 16 MMOL/L (ref 7–15)
BUN SERPL-MCNC: 14.1 MG/DL (ref 8–23)
CALCIUM SERPL-MCNC: 9.8 MG/DL (ref 8.8–10.2)
CHLORIDE SERPL-SCNC: 107 MMOL/L (ref 98–107)
CREAT SERPL-MCNC: 0.99 MG/DL (ref 0.51–0.95)
DEPRECATED HCO3 PLAS-SCNC: 20 MMOL/L (ref 22–29)
GFR SERPL CREATININE-BSD FRML MDRD: 60 ML/MIN/1.73M2
GLUCOSE SERPL-MCNC: 84 MG/DL (ref 70–99)
POTASSIUM SERPL-SCNC: 4.5 MMOL/L (ref 3.4–5.3)
SODIUM SERPL-SCNC: 143 MMOL/L (ref 136–145)

## 2022-12-02 PROCEDURE — G0439 PPPS, SUBSEQ VISIT: HCPCS | Performed by: INTERNAL MEDICINE

## 2022-12-02 PROCEDURE — 99214 OFFICE O/P EST MOD 30 MIN: CPT | Mod: 25 | Performed by: INTERNAL MEDICINE

## 2022-12-02 RX ORDER — METOPROLOL SUCCINATE 25 MG/1
25 TABLET, EXTENDED RELEASE ORAL DAILY
Qty: 90 TABLET | Refills: 3 | Status: SHIPPED | OUTPATIENT
Start: 2022-12-02 | End: 2023-11-28

## 2022-12-02 RX ORDER — OMEPRAZOLE 40 MG/1
CAPSULE, DELAYED RELEASE ORAL
Qty: 90 CAPSULE | Refills: 3 | Status: SHIPPED | OUTPATIENT
Start: 2022-12-02 | End: 2023-11-28

## 2022-12-02 RX ORDER — ATORVASTATIN CALCIUM 40 MG/1
40 TABLET, FILM COATED ORAL DAILY
Qty: 90 TABLET | Refills: 3 | Status: SHIPPED | OUTPATIENT
Start: 2022-12-02 | End: 2023-11-28

## 2022-12-02 RX ORDER — LISINOPRIL 10 MG/1
10 TABLET ORAL AT BEDTIME
Qty: 90 TABLET | Refills: 0 | Status: SHIPPED | OUTPATIENT
Start: 2022-12-02 | End: 2023-02-07

## 2022-12-02 ASSESSMENT — ENCOUNTER SYMPTOMS
CHILLS: 0
JOINT SWELLING: 0
ARTHRALGIAS: 1
EYE PAIN: 0
PALPITATIONS: 0
PARESTHESIAS: 0
HEMATOCHEZIA: 0
BREAST MASS: 0
HEADACHES: 1
DIZZINESS: 0
MYALGIAS: 0
SORE THROAT: 0
NERVOUS/ANXIOUS: 0
NAUSEA: 0
DYSURIA: 1
HEMATURIA: 0
SHORTNESS OF BREATH: 1
WEAKNESS: 0
HEARTBURN: 1
FREQUENCY: 0
FEVER: 0
CONSTIPATION: 0
ABDOMINAL PAIN: 0
DIARRHEA: 0
COUGH: 0

## 2022-12-02 ASSESSMENT — ACTIVITIES OF DAILY LIVING (ADL): CURRENT_FUNCTION: NO ASSISTANCE NEEDED

## 2022-12-02 NOTE — PROGRESS NOTES
"SUBJECTIVE:   Debby is a 72 year old who presents for Preventive Visit.    Patient has been advised of split billing requirements and indicates understanding: Yes  Are you in the first 12 months of your Medicare coverage?  No    Healthy Habits:     In general, how would you rate your overall health?  Good    Frequency of exercise:  4-5 days/week    Duration of exercise:  Greater than 60 minutes    Do you usually eat at least 4 servings of fruit and vegetables a day, include whole grains    & fiber and avoid regularly eating high fat or \"junk\" foods?  No    Taking medications regularly:  Yes    Ability to successfully perform activities of daily living:  No assistance needed    Home Safety:  No safety concerns identified    Hearing Impairment:  Feel that people are mumbling or not speaking clearly, difficulty following dialogue in the theater and need to ask people to speak up or repeat themselves    In the past 6 months, have you been bothered by leaking of urine?  No    In general, how would you rate your overall mental or emotional health?  Good      PHQ-2 Total Score: 0      Have you ever done Advance Care Planning? (For example, a Health Directive, POLST, or a discussion with a medical provider or your loved ones about your wishes): No, advance care planning information given to patient to review.  Patient plans to discuss their wishes with loved ones or provider.         Fall risk  Fallen 2 or more times in the past year?: No  Any fall with injury in the past year?: No    Cognitive Screening   1) Repeat 3 items (Leader, Season, Table)    2) Clock draw: NORMAL  3) 3 item recall: Recalls 3 objects  Results: 3 items recalled: COGNITIVE IMPAIRMENT LESS LIKELY    Mini-CogTM Copyright RUBIA Carrion. Licensed by the author for use in [facility]; reprinted with permission (yeny@.[facility]). All rights reserved.      Do you have sleep apnea, excessive snoring or daytime drowsiness?: no    Reviewed and updated as " needed this visit by clinical staff   Tobacco   Meds              Reviewed and updated as needed this visit by Provider     Meds             Social History     Tobacco Use     Smoking status: Never     Smokeless tobacco: Never   Substance Use Topics     Alcohol use: Yes     Comment: occasional         Alcohol Use 11/25/2022   Prescreen: >3 drinks/day or >7 drinks/week? No   Prescreen: >3 drinks/day or >7 drinks/week? -               Current providers sharing in care for this patient include:   Patient Care Team:  Ida Lyles MD as PCP - General (Internal Medicine)  Ida Lyles MD as Assigned PCP  Ranulfo Martin MD as Assigned Heart and Vascular Provider  Ama Kapoor PA-C as Physician Assistant (Dermatology)  Charleen Mendoza PA-C as Assigned Surgical Provider    The following health maintenance items are reviewed in Epic and correct as of today:  Health Maintenance   Topic Date Due     DTAP/TDAP/TD IMMUNIZATION (7 - Td or Tdap) 05/31/2022     ANNUAL REVIEW OF HM ORDERS  11/29/2022     MAMMO SCREENING  11/17/2023     LIPID  11/30/2023     MEDICARE ANNUAL WELLNESS VISIT  12/02/2023     FALL RISK ASSESSMENT  12/02/2023     ADVANCE CARE PLANNING  12/02/2027     COLORECTAL CANCER SCREENING  02/22/2031     DEXA  11/02/2032     HEPATITIS C SCREENING  Completed     PHQ-2 (once per calendar year)  Completed     INFLUENZA VACCINE  Completed     Pneumococcal Vaccine: 65+ Years  Completed     COVID-19 Vaccine  Completed     IPV IMMUNIZATION  Aged Out     MENINGITIS IMMUNIZATION  Aged Out     ZOSTER IMMUNIZATION  Discontinued     Lab work is in process  Labs reviewed in EPIC          Review of Systems   Constitutional: Negative for chills and fever.   HENT: Negative for congestion, ear pain, hearing loss and sore throat.    Eyes: Negative for pain and visual disturbance.   Respiratory: Positive for shortness of breath. Negative for cough.    Cardiovascular: Positive for chest pain.  "Negative for palpitations and peripheral edema.   Gastrointestinal: Positive for heartburn. Negative for abdominal pain, constipation, diarrhea, hematochezia and nausea.   Breasts:  Negative for tenderness, breast mass and discharge.   Genitourinary: Positive for dysuria. Negative for frequency, genital sores, hematuria, pelvic pain, urgency, vaginal bleeding and vaginal discharge.   Musculoskeletal: Positive for arthralgias. Negative for joint swelling and myalgias.   Skin: Negative for rash.   Neurological: Positive for headaches. Negative for dizziness, weakness and paresthesias.   Psychiatric/Behavioral: Negative for mood changes. The patient is not nervous/anxious.          OBJECTIVE:   BP (!) 140/70   Pulse 61   Ht 1.651 m (5' 5\")   Wt 73.2 kg (161 lb 4.8 oz)   LMP  (LMP Unknown)   SpO2 98%   BMI 26.84 kg/m   Estimated body mass index is 26.84 kg/m  as calculated from the following:    Height as of this encounter: 1.651 m (5' 5\").    Weight as of this encounter: 73.2 kg (161 lb 4.8 oz).  Physical Exam  GENERAL APPEARANCE: healthy, alert and no distress  EYES: Eyes grossly normal to inspection, PERRL and conjunctivae and sclerae normal  HENT: ear canals and TM's normal, nose and mouth without ulcers or lesions, oropharynx clear and oral mucous membranes moist  NECK: no adenopathy, no asymmetry, masses, or scars and thyroid normal to palpation  RESP: lungs clear to auscultation - no rales, rhonchi or wheezes  CV: regular rate and rhythm, normal S1 S2, no S3 or S4, no murmur, click or rub, no peripheral edema and peripheral pulses strong  ABDOMEN: soft, nontender, no hepatosplenomegaly, no masses and bowel sounds normal  MS: no musculoskeletal defects are noted and gait is age appropriate without ataxia  SKIN: no suspicious lesions or rashes  NEURO: Normal strength and tone, sensory exam grossly normal, mentation intact and speech normal  PSYCH: mentation appears normal and affect normal/bright    Labs " "reviewed in Epic     ASSESSMENT / PLAN:       ICD-10-CM    1. Encounter for Medicare annual wellness exam  Z00.00       2. Benign essential hypertension  I10 lisinopril (ZESTRIL) 10 MG tablet      3. Coronary artery disease involving native coronary artery of native heart without angina pectoris  I25.10 metoprolol succinate ER (TOPROL XL) 25 MG 24 hr tablet      4. Pure hypercholesterolemia  E78.00 atorvastatin (LIPITOR) 40 MG tablet      5. Gastroesophageal reflux disease without esophagitis  K21.9 omeprazole (PRILOSEC) 40 MG DR capsule        -Updated screening, immunizations, prevention.  Please see health maintenance list, care gaps  -rec'd starting aCEi for HTN. Trend over last year has been BPs in the upper 130s-140s. Home monitoring rec'd  -cont secondary prev for CAD.  ASA, statin. LDL at goal  -can get f/u BMP done at CV appt in Dec   Patient has been advised of split billing requirements and indicates understanding: Yes      COUNSELING:  Reviewed preventive health counseling, as reflected in patient instructions      BMI:   Estimated body mass index is 26.84 kg/m  as calculated from the following:    Height as of this encounter: 1.651 m (5' 5\").    Weight as of this encounter: 73.2 kg (161 lb 4.8 oz).         She reports that she has never smoked. She has never used smokeless tobacco.      Appropriate preventive services were discussed with this patient, including applicable screening as appropriate for cardiovascular disease, diabetes, osteopenia/osteoporosis, and glaucoma.  As appropriate for age/gender, discussed screening for colorectal cancer, prostate cancer, breast cancer, and cervical cancer. Checklist reviewing preventive services available has been given to the patient.    Reviewed patients plan of care and provided an AVS. The Basic Care Plan (routine screening as documented in Health Maintenance) for Debby meets the Care Plan requirement. This Care Plan has been established and reviewed with " the Patient.          Bernabe Prescott MD  [facility]    Identified Health Risks:

## 2022-12-02 NOTE — PATIENT INSTRUCTIONS
When checking your blood pressure at home make sure you do the following:  No exercise, caffeine or nicotine within the past 30 minutes  Sit down and relax for 5 to 10 minutes before checking  3.   Check your blood pressure 3 times  by 1 minute intervals.  Average the 3 readings to get your final value.  4.   If you do not like math instead of averaging the 3 values you can cross out the high and low readings and use the middle value.       www.validateBP.org- list of high quality home BP monitors independently validated for quality and accuracy    Patient Education   Personalized Prevention Plan  You are due for the preventive services outlined below.  Your care team is available to assist you in scheduling these services.  If you have already completed any of these items, please share that information with your care team to update in your medical record.  Health Maintenance Due   Topic Date Due    Diptheria Tetanus Pertussis (DTAP/TDAP/TD) Vaccine (7 - Td or Tdap) 05/31/2022    ANNUAL REVIEW OF HM ORDERS  11/29/2022       Understanding USDA MyPlate  The USDA has guidelines to help you make healthy food choices. These are called MyPlate. MyPlate shows the food groups that make up healthy meals using the image of a place setting. Before you eat, think about the healthiest choices for what to put on your plate or in your cup or bowl. To learn more about building a healthy plate, visit www.choosemyplate.gov.    The food groups  Fruits. Any fruit or 100% fruit juice counts as part of the Fruit Group. Fruits may be fresh, canned, frozen, or dried, and may be whole, cut-up, or pureed. Make 1/2 of your plate fruits and vegetables.  Vegetables. Any vegetable or 100% vegetable juice counts as a member of the Vegetable Group. Vegetables may be fresh, frozen, canned, or dried. They can be served raw or cooked and may be whole, cut-up, or mashed. Make 1/2 of your plate fruits and vegetables.  Grains. All foods made  from grains are part of the Grains Group. These include wheat, rice, oats, cornmeal, and barley. Grains are often used to make foods such as bread, pasta, oatmeal, cereal, tortillas, and grits. Grains should be no more than 1/4 of your plate. At least half of your grains should be whole grains.  Protein. This group includes meat, poultry, seafood, beans and peas, eggs, processed soy products (such as tofu), nuts (including nut butters), and seeds. Make protein choices no more than 1/4 of your plate. Meat and poultry choices should be lean or low fat.  Dairy. The Dairy Group includes all fluid milk products and foods made from milk that contain calcium, such as yogurt and cheese. (Foods that have little calcium, such as cream, butter, and cream cheese, are not part of this group.) Most dairy choices should be low-fat or fat-free.  Oils. Oils aren't a food group, but they do contain essential nutrients. However it's important to watch your intake of oils. These are fats that are liquid at room temperature. They include canola, corn, olive, soybean, vegetable, and sunflower oil. Foods that are mainly oil include mayonnaise, certain salad dressings, and soft margarines. You likely already get your daily oil allowance from the foods you eat.  Things to limit  Eating healthy also means limiting these things in your diet:     Salt (sodium). Many processed foods have a lot of sodium. To keep sodium intake down, eat fresh vegetables, meats, poultry, and seafood when possible. Purchase low-sodium, reduced-sodium, or no-salt-added food products at the store. And don't add salt to your meals at home. Instead, season them with herbs and spices such as dill, oregano, cumin, and paprika. Or try adding flavor with lemon or lime zest and juice.  Saturated fat. Saturated fats are most often found in animal products such as beef, pork, and chicken. They are often solid at room temperature, such as butter. To reduce your saturated fat  intake, choose leaner cuts of meat and poultry. And try healthier cooking methods such as grilling, broiling, roasting, or baking. For a simple lower-fat swap, use plain nonfat yogurt instead of mayonnaise when making potato salad or macaroni salad.  Added sugars. These are sugars added to foods. They are in foods such as ice cream, candy, soda, fruit drinks, sports drinks, energy drinks, cookies, pastries, jams, and syrups. Cut down on added sugars by sharing sweet treats with a family member or friend. You can also choose fruit for dessert, and drink water or other unsweetened beverages.     [employer] last reviewed this educational content on 6/1/2020 2000-2021 [employer]. All rights reserved. This information is not intended as a substitute for professional medical care. Always follow your healthcare professional's instructions.          Signs of Hearing Loss      Hearing much better with one ear can be a sign of hearing loss.   Hearing loss is a problem shared by many people. In fact, it is one of the most common health problems, particularly as people age. Most people age 65 and older have some hearing loss. By age 80, almost everyone does. Hearing loss often occurs slowly over the years. So you may not realize your hearing has gotten worse.  Have your hearing checked  Call your healthcare provider if you:  Have to strain to hear normal conversation  Have to watch other people s faces very carefully to follow what they re saying  Need to ask people to repeat what they ve said  Often misunderstand what people are saying  Turn the volume of the television or radio up so high that others complain  Feel that people are mumbling when they re talking to you  Find that the effort to hear leaves you feeling tired and irritated  Notice, when using the phone, that you hear better with one ear than the other  [employer] last reviewed this educational content on 1/1/2020 2000-2021 [employer]. All rights reserved. This information is not intended as a substitute for professional medical care. Always follow your healthcare professional's instructions.

## 2022-12-06 ENCOUNTER — TRANSFERRED RECORDS (OUTPATIENT)
Dept: HEALTH INFORMATION MANAGEMENT | Facility: CLINIC | Age: 72
End: 2022-12-06

## 2022-12-16 ENCOUNTER — OFFICE VISIT (OUTPATIENT)
Dept: CARDIOLOGY | Facility: CLINIC | Age: 72
End: 2022-12-16
Payer: MEDICARE

## 2022-12-16 VITALS
DIASTOLIC BLOOD PRESSURE: 64 MMHG | WEIGHT: 157.1 LBS | HEIGHT: 65 IN | OXYGEN SATURATION: 97 % | HEART RATE: 67 BPM | BODY MASS INDEX: 26.17 KG/M2 | SYSTOLIC BLOOD PRESSURE: 122 MMHG

## 2022-12-16 DIAGNOSIS — I10 ESSENTIAL HYPERTENSION: Primary | ICD-10-CM

## 2022-12-16 DIAGNOSIS — I20.89 OTHER FORMS OF ANGINA PECTORIS (H): ICD-10-CM

## 2022-12-16 DIAGNOSIS — I25.10 CORONARY ARTERY DISEASE INVOLVING NATIVE CORONARY ARTERY OF NATIVE HEART WITHOUT ANGINA PECTORIS: ICD-10-CM

## 2022-12-16 DIAGNOSIS — E78.5 HYPERLIPIDEMIA LDL GOAL <70: ICD-10-CM

## 2022-12-16 PROCEDURE — 99214 OFFICE O/P EST MOD 30 MIN: CPT | Performed by: INTERNAL MEDICINE

## 2022-12-16 NOTE — LETTER
12/16/2022    Ida Lyles MD  [address] [facility] [address]    RE: Debby Chan       Dear Colleague,     I had the pleasure of seeing Debby ALLEY Chan in the [facility].  HPI and Plan:   See dictation          Orders Placed This Encounter   Procedures     Basic metabolic panel     Lipid Profile     ALT     Follow-Up with Cardiology       No orders of the defined types were placed in this encounter.      Medications Discontinued During This Encounter   Medication Reason     triamcinolone (KENALOG) 0.025 % cream Alternate therapy         Encounter Diagnoses   Name Primary?     Coronary artery disease involving native coronary artery of native heart without angina pectoris      Hyperlipidemia LDL goal <70      Other forms of angina pectoris (H)      Essential hypertension Yes       CURRENT MEDICATIONS:  Current Outpatient Medications   Medication Sig Dispense Refill     Ascorbic Acid (VITAMIN C) 500 MG CAPS        aspirin 81 MG EC tablet Take 1 tablet (81 mg) by mouth daily       atorvastatin (LIPITOR) 40 MG tablet Take 1 tablet (40 mg) by mouth daily 90 tablet 3     Calcium Carbonate-Vitamin D (CALCIUM + D PO) Take  by mouth. Plus magnesium       Cholecalciferol (VITAMIN D) 2000 UNITS tablet Take 1 tablet by mouth daily.       lisinopril (ZESTRIL) 10 MG tablet Take 1 tablet (10 mg) by mouth At Bedtime 90 tablet 0     metoprolol succinate ER (TOPROL XL) 25 MG 24 hr tablet Take 1 tablet (25 mg) by mouth daily 90 tablet 3     Multiple Vitamins-Minerals (ZINC PO)        omeprazole (PRILOSEC) 40 MG DR capsule TAKE 1 CAPSULE DAILY 30-60 MINUTES BEFORE A MEAL. 90 capsule 3     triamcinolone (KENALOG) 0.1 % external ointment Apply sparingly to affected area three times daily for 14 days. 30 g 0     UNABLE TO FIND Instaflex for joints       vitamin E (TOCOPHEROL) 1000 units (450 mg) CAPS capsule Take 1,000 Units by mouth daily         ALLERGIES     Allergies   Allergen  Reactions     Penicillins Nausea       PAST MEDICAL HISTORY:  Past Medical History:   Diagnosis Date     Acid reflux disease      CAD (coronary artery disease)      Impaired fasting glucose      Pure hypercholesterolemia        PAST SURGICAL HISTORY:  Past Surgical History:   Procedure Laterality Date     BREAST BIOPSY, RT/LT Right 1985    benign fibroadenoma     CV CORONARY ANGIOGRAM N/A 3/10/2020    Procedure: Coronary Angiogram;  Surgeon: Daniel Huffman MD;  Location:  HEART CARDIAC CATH LAB     CV FRACTIONAL FLOW RATIO WIRE N/A 3/10/2020    Procedure: Fractional Flow Reserve;  Surgeon: Daniel Huffman MD;  Location:  HEART CARDIAC CATH LAB     CV INSTANTANEOUS WAVE-FREE RATIO N/A 3/10/2020    Procedure: Instantaneous Wave-Free Ratio;  Surgeon: Daniel Huffman MD;  Location:  HEART CARDIAC CATH LAB     CV LEFT HEART CATH N/A 3/10/2020    Procedure: Left Heart Cath;  Surgeon: Daniel Huffman MD;  Location:  HEART CARDIAC CATH LAB     CV LEFT VENTRICULOGRAM N/A 3/10/2020    Procedure: Left Ventriculogram;  Surgeon: Daniel Huffman MD;  Location:  HEART CARDIAC CATH LAB     ESOPHAGOSCOPY, GASTROSCOPY, DUODENOSCOPY (EGD), COMBINED N/A 12/21/2017    Procedure: COMBINED ESOPHAGOSCOPY, GASTROSCOPY, DUODENOSCOPY (EGD), BIOPSY SINGLE OR MULTIPLE;  gastroscopy;  Surgeon: Srinivasa Jackson MD;  Location:  GI     TUBAL LIGATION  1990       FAMILY HISTORY:  Family History   Problem Relation Age of Onset     Myocardial Infarction Father         later in life; s/p PCI     Prostate Cancer Father      Diabetes Type 2  Father      Skin Cancer Father      Heart Failure Mother      Uterine Cancer Paternal Aunt      Melanoma Sister      Cerebrovascular Disease No family hx of      Coronary Artery Disease Early Onset No family hx of      Ovarian Cancer No family hx of      Colon Cancer No family hx of        SOCIAL HISTORY:  Social History     Socioeconomic History     Marital status:       "Spouse name: None     Number of children: None     Years of education: None     Highest education level: None   Occupational History     Occupation: Retired from [employer]   Tobacco Use     Smoking status: Never     Smokeless tobacco: Never   Substance and Sexual Activity     Alcohol use: Yes     Comment: occasional     Drug use: No     Sexual activity: Not Currently   Other Topics Concern     Parent/sibling w/ CABG, MI or angioplasty before 65F 55M? No   Social History Narrative    .    No kids.    No formal exercise, but stays active.        Review of Systems:  Skin:          Eyes:         ENT:         Respiratory:          Cardiovascular:         Gastroenterology:        Genitourinary:         Musculoskeletal:         Neurologic:         Psychiatric:         Heme/Lymph/Imm:         Endocrine:           Physical Exam:  Vitals: /64   Pulse 67   Ht 1.651 m (5' 5\")   Wt 71.3 kg (157 lb 1.6 oz)   LMP  (LMP Unknown)   SpO2 97%   BMI 26.14 kg/m      Constitutional:  cooperative, alert and oriented, well developed, well nourished, in no acute distress;cooperative        Skin:  warm and dry to the touch, no apparent skin lesions or masses noted          Head:  normocephalic, no masses or lesions        Eyes:  pupils equal and round        Lymph:No Cervical lymphadenopathy present     ENT:  no pallor or cyanosis        Neck:  carotid pulses are full and equal bilaterally, JVP normal, no carotid bruit        Respiratory:  normal breath sounds, clear to auscultation, normal A-P diameter, normal symmetry, normal respiratory excursion, no use of accessory muscles         Cardiac: regular rhythm, normal S1/S2, no S3 or S4, apical impulse not displaced, no murmurs, gallops or rubs                pulses full and equal                                        GI:  not assessed this visit        Extremities and Muscular Skeletal:  no deformities, clubbing, cyanosis, erythema observed;no edema              Neurological: "  no gross motor deficits;affect appropriate        Psych:  Alert and Oriented x 3        CC  Jeanne Russ, KADEEM CNP  [address] [place] [address]    Thank you for allowing me to participate in the care of your patient.      Sincerely,     Ranulfo Martin MD, MD     [facility]

## 2022-12-16 NOTE — PROGRESS NOTES
Service Date: 12/16/2022    CARDIOLOGY FOLLOWUP VISIT     REFERRING PHYSICIAN:  Ida Lyles MD     HISTORY OF PRESENT ILLNESS:  It is my pleasure to see your patient Debby Chan, who is a very pleasant, 72-year-old patient with a history of atypical chest discomfort who also has a history of reflux esophagitis.  On coronary angiography last year, this patient had moderate coronary artery disease with a 60% stenosis of the LAD, which was non-flow limiting based upon fractional flow reserve. Last year, this patient had symptoms suggestive of angina, but again it is complicated by the fact that this patient also has significant gastroesophageal reflux, which can mimic angina pectoris.  She had a stress echocardiogram performed, which showed no evidence of ischemia on the imaging portion, but she had significant ST changes.  For that reason, she then had a Lexiscan study, which clearly showed no evidence of ischemia.  Stress EKGs alone should not be used in this patient because of proven false positives for any future investigations.    Her lipid profile was quite good.  The LDL was good at 64, HDL was excellent at 62, triglycerides were borderline raised at 174, and her total cholesterol was 161.  Liver function tests were normal with an ALT of 23, indicating no hepatic toxicity.  This patient has borderline renal function.  Her renal function is virtually identical to what it was this time last year with a GFR of 60.  Her GFR was 60, also, last year.  Creatinine was 0.99 this year, and the BUN was 14.1.  By all accounts, she is taking an over-the-counter supplement, which can affect kidney function.  The lisinopril has been started recently by her primary care team, which has significantly improved her blood pressure.  It has dropped from 140/79 down to 122/64, which is excellent.    She gets occasional twinges of discomfort, which sound distinctly noncardiac, and her symptoms have not changed since I  last saw her.    IMPRESSION:    1.  Coronary artery disease.  The patient does not have symptoms suggestive of angina pectoris.  2.  With the exception of borderline triglycerides, she has an excellent lipid profile with no evidence of hepatic toxicity.  3.  Much improved blood pressure with the addition of lisinopril.  4.  Borderline renal function.    PLAN:  We will continue the patient on her present medications.  I will see the patient back again in 1 year's time.  We will recheck a basic metabolic profile and lipid profile at that time.  As always, however, the patient has been told to contact us if she has any questions or any concerns.    cc:  Ida Lyles MD   [facility], [facility]  [address] [place] [address]    Ranulfo Grant MD, [facility]        D: 2022   T: 2022   MT: sarina    Name:     LISBETH HAYES  MRN:      [id number]        Account:      [id number]   :      1950           Service Date: 2022       Document: [id number]

## 2023-02-06 ENCOUNTER — MYC MEDICAL ADVICE (OUTPATIENT)
Dept: INTERNAL MEDICINE | Facility: CLINIC | Age: 73
End: 2023-02-06
Payer: MEDICARE

## 2023-02-07 ENCOUNTER — TELEPHONE (OUTPATIENT)
Dept: INTERNAL MEDICINE | Facility: CLINIC | Age: 73
End: 2023-02-07

## 2023-02-07 ENCOUNTER — OFFICE VISIT (OUTPATIENT)
Dept: PODIATRY | Facility: CLINIC | Age: 73
End: 2023-02-07
Payer: MEDICARE

## 2023-02-07 VITALS — SYSTOLIC BLOOD PRESSURE: 120 MMHG | DIASTOLIC BLOOD PRESSURE: 64 MMHG

## 2023-02-07 DIAGNOSIS — I10 BENIGN ESSENTIAL HYPERTENSION: Primary | ICD-10-CM

## 2023-02-07 DIAGNOSIS — M20.5X1 HALLUX LIMITUS OF RIGHT FOOT: Primary | ICD-10-CM

## 2023-02-07 DIAGNOSIS — M21.621 TAILOR'S BUNIONETTE, RIGHT: ICD-10-CM

## 2023-02-07 PROCEDURE — 99204 OFFICE O/P NEW MOD 45 MIN: CPT | Performed by: PODIATRIST

## 2023-02-07 RX ORDER — IRBESARTAN 150 MG/1
150 TABLET ORAL AT BEDTIME
Qty: 90 TABLET | Refills: 0 | Status: SHIPPED | OUTPATIENT
Start: 2023-02-07 | End: 2023-03-08

## 2023-02-07 RX ORDER — IRBESARTAN 150 MG/1
150 TABLET ORAL AT BEDTIME
Qty: 90 TABLET | Refills: 0 | Status: SHIPPED | OUTPATIENT
Start: 2023-02-07 | End: 2023-02-07

## 2023-02-07 RX ORDER — MELOXICAM 15 MG/1
15 TABLET ORAL DAILY
Qty: 28 TABLET | Refills: 1 | Status: SHIPPED | OUTPATIENT
Start: 2023-02-07 | End: 2023-03-08

## 2023-02-07 NOTE — PATIENT INSTRUCTIONS
PATIENT INSTRUCTIONS - Podiatry / Foot & Ankle Surgery      Standing foot x-ray if not improved      Diclofenac / Voltaren Gel- Apply to affected area only.  Apply 3-4 times daily for the first 3-4 days, then 1-2 times daily as needed.  Over the counter (OTC), a prescription is not needed.  Available at most pharmacies, [facility], [facility].        Meloxicam- 1/2-1 pill daily x1 week, then take a 1 week break.  Repeat as needed.  Take with food & an acid blocker if stomach upset.  Stop all other NSAIDs (aspirin, ibuprofen/Motrin, naproxen/ Aleve).      [facility]  [facility]  [address]  [place] [address]  Phone: [phone]  Fax: [phone] [facility]  [address]  [place] [address]  Phone: [phone]   Fax: [phone]   [facility]  [address]  [place] [address]  Phone: [phone]  Fax: [phone] [facility]  [address]  [place] [address]  Phone: [phone]   Fax: [phone]   [facility]   [address]  [place] [address]  Phone: [phone]  Fax: [phone] * Please call any location listed to make an appointment for a casting/fitting. Your referral was sent to their central office and they will all have the order on file.         Silicone spacer for tailor's bunion - [email or web address]    Neutral, cushioned walking shoe, <10mm heel drop - to improve big toe motion

## 2023-02-07 NOTE — LETTER
2/7/2023         RE: Debby Chan  [address]  [facility] [address]        Dear Colleague,    Thank you for referring your patient, Debby Chan, to the [facility]. Please see a copy of my visit note below.    Assessment:      ICD-10-CM    1. Hallux limitus of right foot  M20.5X1 [facility] D[facility] Orthotic; Foot Orthotics     meloxicam (MOBIC) 15 MG tablet     CANCELED: XR Foot Right G/E 3 Views      2. Tailor's bunionette, right  M21.621 [facility] DME Orthotic; Foot Orthotics     meloxicam (MOBIC) 15 MG tablet     CANCELED: XR Foot Right G/E 3 Views           Plan:  Orders Placed This Encounter   Procedures     Orthotics and Prosthetics DME Orthotic; Foot Orthotics       Discussed the etiology and treatment of the condition with the patient.  Imaging studies reviewed and discussed with the patient.  Discussed surgical and conservative options.    Hallux limitus & tailor's bunion    -Orthotics Rx  -Injection- to 1st MTPJ if needed  -NSAID- topical  -Shoe recommendations  -Toe spacer    Standing foot x-ray R if not improved        Return:  No follow-ups on file.    Edie Gao DPM                Chief Complaint:     Patient presents with:  Foot Problems     right foot pain    HPI:  Debby Chan is a 72 year old year old female who presents for evaluation of foot pain.    Pain location- dorsal HIPJ, R  Between 4th, 5th digits R        Past Medical & Surgical History:  Past Medical History:   Diagnosis Date     Acid reflux disease      CAD (coronary artery disease)      Impaired fasting glucose      Pure hypercholesterolemia       Past Surgical History:   Procedure Laterality Date     BREAST BIOPSY, RT/LT Right 1985    benign fibroadenoma     CV CORONARY ANGIOGRAM N/A 3/10/2020    Procedure: Coronary Angiogram;  Surgeon: Daniel Huffman MD;  Location:  HEART CARDIAC CATH LAB     CV FRACTIONAL FLOW  RATIO WIRE N/A 3/10/2020    Procedure: Fractional Flow Reserve;  Surgeon: Daniel Huffman MD;  Location:  HEART CARDIAC CATH LAB     CV INSTANTANEOUS WAVE-FREE RATIO N/A 3/10/2020    Procedure: Instantaneous Wave-Free Ratio;  Surgeon: Daniel Huffman MD;  Location:  HEART CARDIAC CATH LAB     CV LEFT HEART CATH N/A 3/10/2020    Procedure: Left Heart Cath;  Surgeon: Daniel Huffman MD;  Location:  HEART CARDIAC CATH LAB     CV LEFT VENTRICULOGRAM N/A 3/10/2020    Procedure: Left Ventriculogram;  Surgeon: Daniel Huffman MD;  Location:  HEART CARDIAC CATH LAB     ESOPHAGOSCOPY, GASTROSCOPY, DUODENOSCOPY (EGD), COMBINED N/A 12/21/2017    Procedure: COMBINED ESOPHAGOSCOPY, GASTROSCOPY, DUODENOSCOPY (EGD), BIOPSY SINGLE OR MULTIPLE;  gastroscopy;  Surgeon: Srinivasa Jackson MD;  Location:  GI     TUBAL LIGATION  1990      Family History   Problem Relation Age of Onset     Myocardial Infarction Father         later in life; s/p PCI     Prostate Cancer Father      Diabetes Type 2  Father      Skin Cancer Father      Heart Failure Mother      Uterine Cancer Paternal Aunt      Melanoma Sister      Cerebrovascular Disease No family hx of      Coronary Artery Disease Early Onset No family hx of      Ovarian Cancer No family hx of      Colon Cancer No family hx of         Social History:  ?  History   Smoking Status     Never   Smokeless Tobacco     Never     History   Drug Use No     Social History    Substance and Sexual Activity      Alcohol use: Yes        Comment: occasional      Allergies:  ?   Allergies   Allergen Reactions     Penicillins Nausea        Medications:    Current Outpatient Medications   Medication     meloxicam (MOBIC) 15 MG tablet     Ascorbic Acid (VITAMIN C) 500 MG CAPS     aspirin 81 MG EC tablet     atorvastatin (LIPITOR) 40 MG tablet     Calcium Carbonate-Vitamin D (CALCIUM + D PO)     Cholecalciferol (VITAMIN D) 2000 UNITS tablet     lisinopril (ZESTRIL) 10 MG tablet      metoprolol succinate ER (TOPROL XL) 25 MG 24 hr tablet     Multiple Vitamins-Minerals (ZINC PO)     omeprazole (PRILOSEC) 40 MG DR capsule     triamcinolone (KENALOG) 0.1 % external ointment     UNABLE TO FIND     vitamin E (TOCOPHEROL) 1000 units (450 mg) CAPS capsule     No current facility-administered medications for this visit.       Physical Exam:  ?  Vitals:  /64   LMP  (LMP Unknown)    General:  WD/WN, in NAD.  A&O x3.  Dermatologic:    Skin is intact, open lesions absent.   Skin texture, turgor is normal.  Vascular:  Pulses palpable bilateral.  Digital capillary refill time normal bilateral.  Skin temperature is normal right.  Generalized edema- none bilateral.  Focal edema- mild 1st MTPJ right.  Neurologic:    Gross sensation normal.  Gait and balance normal.  Musculoskeletal:  Maximal pain to palpation of dorsal HIPJ right.  moderate pain to palpation of 4th / 5th web space right.  1st MTPJ ROM full, pain free right.    Muscle strength 5/5  foot and ankle bilateral.    Stance:  RCSP Neutral bilateral.  Clinical deformity: hallux limitus, tailor's bunion                Again, thank you for allowing me to participate in the care of your patient.        Sincerely,        Edie Gao DPM

## 2023-02-07 NOTE — PROGRESS NOTES
Assessment:      ICD-10-CM    1. Hallux limitus of right foot  M20.5X1 [facility] DME Orthotic; Foot Orthotics     meloxicam (MOBIC) 15 MG tablet     CANCELED: XR Foot Right G/E 3 Views      2. Crista's lewisionette, right  M21.621 [facility] DME Orthotic; Foot Orthotics     meloxicam (MOBIC) 15 MG tablet     CANCELED: XR Foot Right G/E 3 Views           Plan:  Orders Placed This Encounter   Procedures     Orthotics and Prosthetics DME Orthotic; Foot Orthotics       Discussed the etiology and treatment of the condition with the patient.  Imaging studies reviewed and discussed with the patient.  Discussed surgical and conservative options.    Hallux limitus & crista's bunion    -Orthotics Rx  -Injection- to 1st MTPJ if needed  -NSAID- topical  -Shoe recommendations  -Toe spacer    Standing foot x-ray R if not improved        Return:  No follow-ups on file.    Edie Gao DPM                Chief Complaint:     Patient presents with:  Foot Problems     right foot pain    HPI:  Debby Chan is a 72 year old year old female who presents for evaluation of foot pain.    Pain location- dorsal HIPJ, R  Between 4th, 5th digits R        Past Medical & Surgical History:  Past Medical History:   Diagnosis Date     Acid reflux disease      CAD (coronary artery disease)      Impaired fasting glucose      Pure hypercholesterolemia       Past Surgical History:   Procedure Laterality Date     BREAST BIOPSY, RT/LT Right 1985    benign fibroadenoma     CV CORONARY ANGIOGRAM N/A 3/10/2020    Procedure: Coronary Angiogram;  Surgeon: Daniel Huffman MD;  Location:  HEART CARDIAC CATH LAB     CV FRACTIONAL FLOW RATIO WIRE N/A 3/10/2020    Procedure: Fractional Flow Reserve;  Surgeon: Daniel Huffman MD;  Location: [facility] CARDIAC CATH LAB     CV INSTANTANEOUS WAVE-FREE RATIO N/A 3/10/2020    Procedure: Instantaneous Wave-Free Ratio;  Surgeon: Daniel Huffman MD;  Location:   HEART CARDIAC CATH LAB     CV LEFT HEART CATH N/A 3/10/2020    Procedure: Left Heart Cath;  Surgeon: Daniel Huffman MD;  Location:  HEART CARDIAC CATH LAB     CV LEFT VENTRICULOGRAM N/A 3/10/2020    Procedure: Left Ventriculogram;  Surgeon: Daniel Huffman MD;  Location:  HEART CARDIAC CATH LAB     ESOPHAGOSCOPY, GASTROSCOPY, DUODENOSCOPY (EGD), COMBINED N/A 12/21/2017    Procedure: COMBINED ESOPHAGOSCOPY, GASTROSCOPY, DUODENOSCOPY (EGD), BIOPSY SINGLE OR MULTIPLE;  gastroscopy;  Surgeon: Srinivasa Jackson MD;  Location:  GI     TUBAL LIGATION  1990      Family History   Problem Relation Age of Onset     Myocardial Infarction Father         later in life; s/p PCI     Prostate Cancer Father      Diabetes Type 2  Father      Skin Cancer Father      Heart Failure Mother      Uterine Cancer Paternal Aunt      Melanoma Sister      Cerebrovascular Disease No family hx of      Coronary Artery Disease Early Onset No family hx of      Ovarian Cancer No family hx of      Colon Cancer No family hx of         Social History:  ?  History   Smoking Status     Never   Smokeless Tobacco     Never     History   Drug Use No     Social History    Substance and Sexual Activity      Alcohol use: Yes        Comment: occasional      Allergies:  ?   Allergies   Allergen Reactions     Penicillins Nausea        Medications:    Current Outpatient Medications   Medication     meloxicam (MOBIC) 15 MG tablet     Ascorbic Acid (VITAMIN C) 500 MG CAPS     aspirin 81 MG EC tablet     atorvastatin (LIPITOR) 40 MG tablet     Calcium Carbonate-Vitamin D (CALCIUM + D PO)     Cholecalciferol (VITAMIN D) 2000 UNITS tablet     lisinopril (ZESTRIL) 10 MG tablet     metoprolol succinate ER (TOPROL XL) 25 MG 24 hr tablet     Multiple Vitamins-Minerals (ZINC PO)     omeprazole (PRILOSEC) 40 MG DR capsule     triamcinolone (KENALOG) 0.1 % external ointment     UNABLE TO FIND     vitamin E (TOCOPHEROL) 1000 units (450 mg) CAPS capsule     No  current facility-administered medications for this visit.       Physical Exam:  ?  Vitals:  /64   LMP  (LMP Unknown)    General:  WD/WN, in NAD.  A&O x3.  Dermatologic:    Skin is intact, open lesions absent.   Skin texture, turgor is normal.  Vascular:  Pulses palpable bilateral.  Digital capillary refill time normal bilateral.  Skin temperature is normal right.  Generalized edema- none bilateral.  Focal edema- mild 1st MTPJ right.  Neurologic:    Gross sensation normal.  Gait and balance normal.  Musculoskeletal:  Maximal pain to palpation of dorsal HIPJ right.  moderate pain to palpation of 4th / 5th web space right.  1st MTPJ ROM full, pain free right.    Muscle strength 5/5  foot and ankle bilateral.    Stance:  RCSP Neutral bilateral.  Clinical deformity: hallux limitus, tailor's bunion

## 2023-02-07 NOTE — TELEPHONE ENCOUNTER
Irbesartan 150mg daily prescribed to replace lisinopril.    Blood pressure follow-up with me in 4-6 weeks please as I have not seen her in over a year. Okay to double book with me anywhere.     If blood pressure follow-up is declined, please let me know.

## 2023-02-07 NOTE — TELEPHONE ENCOUNTER
Called and spoke to the patient. Relayed message from the provider. Scheduled patient for a blood pressure follow-up appointment.     Appointments in Next Year        Mar 08, 2023  8:30 AM  (Arrive by 8:10 AM)  Provider Visit with Ida Lyles MD  [facility] ([facility] ) [phone]     Patient would [facility]. Medication re-sent to the requested pharmacy. Patient expressed understanding and had no additional questions at this time.    Ilene Brennan, RN

## 2023-02-07 NOTE — TELEPHONE ENCOUNTER
Patient calling clinic requesting to switch to a different BP medication. Patient stated since starting Lisinopril in December 2022 she has had a persistent cough and was told she could switch to a different BP medication if cough continued. Patient stated blood pressures have been well controlled since starting lisinopril with reading today being 120/64.    Routing to PCP for recommendations.

## 2023-03-07 PROBLEM — I10 BENIGN ESSENTIAL HYPERTENSION: Status: ACTIVE | Noted: 2023-03-07

## 2023-03-08 ENCOUNTER — OFFICE VISIT (OUTPATIENT)
Dept: INTERNAL MEDICINE | Facility: CLINIC | Age: 73
End: 2023-03-08
Payer: MEDICARE

## 2023-03-08 VITALS
HEART RATE: 64 BPM | WEIGHT: 159.8 LBS | BODY MASS INDEX: 26.59 KG/M2 | OXYGEN SATURATION: 99 % | DIASTOLIC BLOOD PRESSURE: 70 MMHG | SYSTOLIC BLOOD PRESSURE: 124 MMHG | TEMPERATURE: 97.2 F

## 2023-03-08 DIAGNOSIS — I10 BENIGN ESSENTIAL HYPERTENSION: Primary | ICD-10-CM

## 2023-03-08 PROCEDURE — 99213 OFFICE O/P EST LOW 20 MIN: CPT | Performed by: INTERNAL MEDICINE

## 2023-03-08 RX ORDER — IRBESARTAN 150 MG/1
150 TABLET ORAL AT BEDTIME
Qty: 90 TABLET | Refills: 3 | Status: SHIPPED | OUTPATIENT
Start: 2023-03-08 | End: 2023-04-27

## 2023-03-08 NOTE — PROGRESS NOTES
ASSESSMENT/PLAN                                                      (I10) Benign essential hypertension  (primary encounter diagnosis)  Comment: well-controlled on current regimen.    Plan: continue present management; refills provided.     Ida Lylse MD   [address] [address]  T: [phone], F: [phone]    SUBJECTIVE                                                      Debby Chan is a very pleasant 72 year old female who presents for blood pressure follow-up:    Patient was on lisinopril 10 mg daily for high blood pressure but it was causing her a frequent and bothersome dry cough.  She was switched to irbesartan 150 mg daily. Tolerating medication(s) well - no adverse side effects - and cough has resolved. Blood pressure is well controlled today.    OBJECTIVE                                                      /70   Pulse 64   Temp 97.2  F (36.2  C) (Temporal)   Wt 72.5 kg (159 lb 12.8 oz)   LMP  (LMP Unknown)   SpO2 99%   BMI 26.59 kg/m    Constitutional: well-appearing  Psych: normal judgment and insight; normal mood and affect; recent and remote memory intact    ---    (Note documentation was completed, in part, with [employer] voice-recognition software. Documentation was reviewed, but some grammatical, spelling, and word errors may remain.)

## 2023-04-18 ENCOUNTER — HOSPITAL ENCOUNTER (EMERGENCY)
Facility: CLINIC | Age: 73
Discharge: HOME | End: 2023-04-18
Admitting: EMERGENCY MEDICINE
Payer: MEDICARE

## 2023-04-18 VITALS
RESPIRATION RATE: 16 BRPM | BODY MASS INDEX: 26.66 KG/M2 | SYSTOLIC BLOOD PRESSURE: 133 MMHG | HEART RATE: 61 BPM | DIASTOLIC BLOOD PRESSURE: 71 MMHG | TEMPERATURE: 98.4 F | WEIGHT: 160 LBS | HEIGHT: 65 IN | OXYGEN SATURATION: 99 %

## 2023-04-18 LAB
ANION GAP SERPL CALCULATED.3IONS-SCNC: 12 MMOL/L (ref 7–15)
ATRIAL RATE - MUSE: 68 BPM
BASOPHILS # BLD AUTO: 0 10E3/UL (ref 0–0.2)
BASOPHILS NFR BLD AUTO: 1 %
BUN SERPL-MCNC: 14.1 MG/DL (ref 8–23)
CALCIUM SERPL-MCNC: 9.7 MG/DL (ref 8.8–10.2)
CHLORIDE SERPL-SCNC: 107 MMOL/L (ref 98–107)
CREAT SERPL-MCNC: 0.81 MG/DL (ref 0.51–0.95)
DEPRECATED HCO3 PLAS-SCNC: 21 MMOL/L (ref 22–29)
DIASTOLIC BLOOD PRESSURE - MUSE: NORMAL MMHG
EOSINOPHIL # BLD AUTO: 0.2 10E3/UL (ref 0–0.7)
EOSINOPHIL NFR BLD AUTO: 3 %
ERYTHROCYTE [DISTWIDTH] IN BLOOD BY AUTOMATED COUNT: 13.3 % (ref 10–15)
GFR SERPL CREATININE-BSD FRML MDRD: 77 ML/MIN/1.73M2
GLUCOSE SERPL-MCNC: 112 MG/DL (ref 70–99)
HCT VFR BLD AUTO: 43.4 % (ref 35–47)
HGB BLD-MCNC: 14.4 G/DL (ref 11.7–15.7)
IMM GRANULOCYTES # BLD: 0 10E3/UL
IMM GRANULOCYTES NFR BLD: 0 %
INTERPRETATION ECG - MUSE: NORMAL
LYMPHOCYTES # BLD AUTO: 1.8 10E3/UL (ref 0.8–5.3)
LYMPHOCYTES NFR BLD AUTO: 30 %
MCH RBC QN AUTO: 30.6 PG (ref 26.5–33)
MCHC RBC AUTO-ENTMCNC: 33.2 G/DL (ref 31.5–36.5)
MCV RBC AUTO: 92 FL (ref 78–100)
MONOCYTES # BLD AUTO: 0.4 10E3/UL (ref 0–1.3)
MONOCYTES NFR BLD AUTO: 7 %
NEUTROPHILS # BLD AUTO: 3.7 10E3/UL (ref 1.6–8.3)
NEUTROPHILS NFR BLD AUTO: 59 %
NRBC # BLD AUTO: 0 10E3/UL
NRBC BLD AUTO-RTO: 0 /100
P AXIS - MUSE: 36 DEGREES
PLATELET # BLD AUTO: 240 10E3/UL (ref 150–450)
POTASSIUM SERPL-SCNC: 4.2 MMOL/L (ref 3.4–5.3)
PR INTERVAL - MUSE: 168 MS
QRS DURATION - MUSE: 70 MS
QT - MUSE: 404 MS
QTC - MUSE: 429 MS
R AXIS - MUSE: 38 DEGREES
RBC # BLD AUTO: 4.7 10E6/UL (ref 3.8–5.2)
SODIUM SERPL-SCNC: 140 MMOL/L (ref 136–145)
SYSTOLIC BLOOD PRESSURE - MUSE: NORMAL MMHG
T AXIS - MUSE: 29 DEGREES
TROPONIN T SERPL HS-MCNC: <6 NG/L
VENTRICULAR RATE- MUSE: 68 BPM
WBC # BLD AUTO: 6.1 10E3/UL (ref 4–11)

## 2023-04-18 PROCEDURE — 36415 COLL VENOUS BLD VENIPUNCTURE: CPT | Performed by: EMERGENCY MEDICINE

## 2023-04-18 PROCEDURE — 85025 COMPLETE CBC W/AUTO DIFF WBC: CPT | Performed by: EMERGENCY MEDICINE

## 2023-04-18 PROCEDURE — 93005 ELECTROCARDIOGRAM TRACING: CPT

## 2023-04-18 PROCEDURE — 999N000104 HC STATISTIC NO CHARGE

## 2023-04-18 PROCEDURE — 84484 ASSAY OF TROPONIN QUANT: CPT | Performed by: EMERGENCY MEDICINE

## 2023-04-18 PROCEDURE — 80048 BASIC METABOLIC PNL TOTAL CA: CPT | Performed by: EMERGENCY MEDICINE

## 2023-04-18 NOTE — ED TRIAGE NOTES
Pt presents with one week of chest pain and abd pain. Pt reports she feel bloating as well. Pain worse with lying down,. Pt also has bilat hussain pain and toe cramping.      Triage Assessment     Row Name 04/18/23 0722       Triage Assessment (Adult)    Airway WDL WDL       Cardiac WDL    Cardiac WDL chest pain  Worse with laying down       Peripheral/Neurovascular WDL    Peripheral Neurovascular WDL WDL       Cognitive/Neuro/Behavioral WDL    Cognitive/Neuro/Behavioral WDL WDL

## 2023-04-24 ENCOUNTER — TRANSFERRED RECORDS (OUTPATIENT)
Dept: HEALTH INFORMATION MANAGEMENT | Facility: CLINIC | Age: 73
End: 2023-04-24
Payer: MEDICARE

## 2023-04-27 ENCOUNTER — TELEPHONE (OUTPATIENT)
Dept: INTERNAL MEDICINE | Facility: CLINIC | Age: 73
End: 2023-04-27
Payer: MEDICARE

## 2023-04-27 NOTE — TELEPHONE ENCOUNTER
Reason for Call:  Other appointment    Detailed comments: patient called and is currently scheduled with Trupti Nelson at  [facility] on May 11.    Wants to know if can see provider next week instead for pre op physical?    Please contact patient.  Thank you.    Phone Number Patient can be reached at: Cell number on file:    Telephone Information:   Mobile [phone]       Best Time: any    Can we leave a detailed message on this number? YES    Call taken on 4/27/2023 at 12:57 PM by Jacy Earl

## 2023-04-28 ENCOUNTER — TELEPHONE (OUTPATIENT)
Dept: INTERNAL MEDICINE | Facility: CLINIC | Age: 73
End: 2023-04-28
Payer: MEDICARE

## 2023-04-28 NOTE — TELEPHONE ENCOUNTER
Reason for Call:  Other appointment    Detailed comments: dos 6/12; cataract; [place] eye consultant; Dr Singh    Phone Number Patient can be reached at: Cell number on file:    Telephone Information:   Mobile [phone]       Best Time: anytime    Can we leave a detailed message on this number? YES    Call taken on 4/28/2023 at 12:53 PM by Ashley Cohn

## 2023-05-04 ENCOUNTER — OFFICE VISIT (OUTPATIENT)
Dept: INTERNAL MEDICINE | Facility: CLINIC | Age: 73
End: 2023-05-04
Payer: MEDICARE

## 2023-05-04 VITALS
HEART RATE: 87 BPM | HEIGHT: 65 IN | SYSTOLIC BLOOD PRESSURE: 130 MMHG | DIASTOLIC BLOOD PRESSURE: 70 MMHG | WEIGHT: 162 LBS | OXYGEN SATURATION: 99 % | TEMPERATURE: 97.3 F | BODY MASS INDEX: 26.99 KG/M2

## 2023-05-04 DIAGNOSIS — Z01.818 PREOPERATIVE EXAMINATION: Primary | ICD-10-CM

## 2023-05-04 PROCEDURE — 99214 OFFICE O/P EST MOD 30 MIN: CPT | Performed by: INTERNAL MEDICINE

## 2023-05-04 NOTE — PROGRESS NOTES
ASSESSMENT/PLAN:                                                      Debby Chan is a pleasant 72 year old female who presents for a preoperative evaluation. She is undergoing a low risk procedure. Her risk of major cardiac event is 1 point: 0.9%.    (Z01.818) Preoperative examination  (primary encounter diagnosis)  Plan: no additional work-up, cardiac or otherwise, indicated prior to surgery; no medications changes/adjustments indicated prior to surgery.    RECOMMEND PROCEEDING WITH SURGERY: YES    Ida Lyles MD   [address] [address]  T: [phone], F: [phone]    SUBJECTIVE:                                                      Debby Chan is a very pleasant 72 year old female who presents for preoperative evaluation.    Surgeon: Francisco  Date: RIGHT - 5/12/2023, LEFT - 5/26/2023  Location: [facility], Fax#[phone]  Surgery: RIGHT then LEFT cataract surgery (low risk)  Indication: bilateral cataracts    Past Medical History:   Diagnosis Date     Acid reflux disease      Benign essential hypertension      CAD (coronary artery disease)      Impaired fasting glucose      Pure hypercholesterolemia      Personal or family history of excessive or prolonged bleeding? No  Personal or family history of blood clots? No  History of snoring/Sleep Apnea? YES - patient snores but has never been tested for/diagnosed with SANDI  History of blood transfusions? No    Cardiovascular risk: Ischemic cardiac disease (1 point)    Risk of major cardiac event: 1 point: 0.9%    Past Surgical History:   Procedure Laterality Date     BREAST BIOPSY, RT/LT Right 1985    benign fibroadenoma     CV CORONARY ANGIOGRAM N/A 3/10/2020    Procedure: Coronary Angiogram;  Surgeon: Daniel Huffman MD;  Location: [facility] CARDIAC CATH LAB     CV FRACTIONAL FLOW RATIO WIRE N/A 3/10/2020    Procedure: Fractional Flow Reserve;  Surgeon:  Daniel Huffman MD;  Location:  HEART CARDIAC CATH LAB     CV INSTANTANEOUS WAVE-FREE RATIO N/A 3/10/2020    Procedure: Instantaneous Wave-Free Ratio;  Surgeon: Daniel Huffman MD;  Location:  HEART CARDIAC CATH LAB     CV LEFT HEART CATH N/A 3/10/2020    Procedure: Left Heart Cath;  Surgeon: Daniel Huffman MD;  Location:  HEART CARDIAC CATH LAB     CV LEFT VENTRICULOGRAM N/A 3/10/2020    Procedure: Left Ventriculogram;  Surgeon: Daniel Huffman MD;  Location:  HEART CARDIAC CATH LAB     ESOPHAGOSCOPY, GASTROSCOPY, DUODENOSCOPY (EGD), COMBINED N/A 12/21/2017    Procedure: COMBINED ESOPHAGOSCOPY, GASTROSCOPY, DUODENOSCOPY (EGD), BIOPSY SINGLE OR MULTIPLE;  gastroscopy;  Surgeon: Srinivasa Jackson MD;  Location:  GI     TUBAL LIGATION  1990     Artificial assistive devices, such as pacemakers, artificial cardiac valves, or artificial joints? No    Allergies   Allergen Reactions     Lisinopril Cough     Penicillins Nausea     Personal or family history of allergy to anesthesia? No  Allergy to local or topical analgesics? No  Allergy to latex? No  Allergy to adhesives/skin preparations (chlorhexadine)? No    Current Outpatient Medications   Medication Sig     Ascorbic Acid (VITAMIN C) 500 MG CAPS      aspirin 81 MG EC tablet Take 1 tablet (81 mg) by mouth daily     atorvastatin (LIPITOR) 40 MG tablet Take 1 tablet (40 mg) by mouth daily     Calcium Carbonate-Vitamin D (CALCIUM + D PO) Take  by mouth. Plus magnesium     Cholecalciferol (VITAMIN D) 2000 UNITS tablet Take 1 tablet by mouth daily.     irbesartan (AVAPRO) 150 MG tablet TAKE 1 TABLET BY MOUTH AT BEDTIME     metoprolol succinate ER (TOPROL XL) 25 MG 24 hr tablet Take 1 tablet (25 mg) by mouth daily     omeprazole (PRILOSEC) 40 MG DR capsule TAKE 1 CAPSULE DAILY 30-60 MINUTES BEFORE A MEAL.     triamcinolone (KENALOG) 0.1 % external ointment Apply sparingly to affected area three times daily for 14 days.     vitamin E (TOCOPHEROL)  "1000 units (450 mg) CAPS capsule Take 1,000 Units by mouth daily     Over the counter medications? Other than above, no  Vitamin Supplements? Other than above, no  Herbal Supplements? No    Family History   Problem Relation Age of Onset     Heart Failure Mother      Myocardial Infarction Father         later in life; s/p PCI     Prostate Cancer Father      Diabetes Type 2  Father      Skin Cancer Father      Melanoma Sister      Uterine Cancer Paternal Aunt      Cerebrovascular Disease No family hx of      Coronary Artery Disease Early Onset No family hx of      Ovarian Cancer No family hx of      Colon Cancer No family hx of      Social History     Occupational History     Occupation: Retired from [employer]   Tobacco Use     Smoking status: Never     Smokeless tobacco: Never   Vaping Use     Vaping status: Never Used   Substance and Sexual Activity     Alcohol use: Yes     Comment: occasional     Drug use: No     Sexual activity: Not Currently   Social History Narrative    .    No kids.    Walking 4-5x/week.     Functional capacity: Can do heavy work around the house like scrubbing floors or moving heavy furniture (7 METs)    OBJECTIVE:                                                      /70   Pulse 87   Temp 97.3  F (36.3  C) (Temporal)   Ht 1.651 m (5' 5\")   Wt 73.5 kg (162 lb)   LMP  (LMP Unknown)   SpO2 99%   BMI 26.96 kg/m    Constitutional: well-appearing  Respiratory: normal respiratory effort; clear to auscultation bilaterally  Cardiovascular: regular rate and rhythm; no edema  Gastrointestinal: soft, non-tender, non-distended, and bowel sounds present; no organomegaly or masses  Musculoskeletal: normal gait and station  Psych: normal judgment and insight; normal mood and affect    ---    (Chart documentation was completed, in part, with OneRiot voice-recognition software. Even though reviewed, some grammatical, spelling, and word errors may remain.)  "

## 2023-10-06 ENCOUNTER — OFFICE VISIT (OUTPATIENT)
Dept: DERMATOLOGY | Facility: CLINIC | Age: 73
End: 2023-10-06
Payer: MEDICARE

## 2023-10-06 DIAGNOSIS — D18.01 ANGIOMA OF SKIN: ICD-10-CM

## 2023-10-06 DIAGNOSIS — L82.1 SEBORRHEIC KERATOSIS: ICD-10-CM

## 2023-10-06 DIAGNOSIS — D22.9 NEVUS: Primary | ICD-10-CM

## 2023-10-06 DIAGNOSIS — Z80.8 FAMILY HISTORY OF MELANOMA: ICD-10-CM

## 2023-10-06 DIAGNOSIS — L57.0 ACTINIC KERATOSIS: ICD-10-CM

## 2023-10-06 DIAGNOSIS — L81.4 LENTIGO: ICD-10-CM

## 2023-10-06 PROCEDURE — 17003 DESTRUCT PREMALG LES 2-14: CPT | Performed by: PHYSICIAN ASSISTANT

## 2023-10-06 PROCEDURE — 99213 OFFICE O/P EST LOW 20 MIN: CPT | Mod: 25 | Performed by: PHYSICIAN ASSISTANT

## 2023-10-06 PROCEDURE — 17000 DESTRUCT PREMALG LESION: CPT | Performed by: PHYSICIAN ASSISTANT

## 2023-10-06 NOTE — PROGRESS NOTES
HPI:   Chief complaints: Debby Chan is a pleasant 73 year old female who presents for Full skin cancer screening to rule out skin cancer   Last Skin Exam: 1.5 years ago     1st Baseline: no  Personal HX of Skin Cancer: no   Personal HX of Malignant Melanoma: no   Family HX of Skin Cancer / Malignant Melanoma: Yes family members with skin CA; sister with melanoma  Personal HX of Atypical Moles:   no  Risk factors: history of sun exposure and burns  New / Changing lesions:yes a persistent rash on the arms and face  Social History: Has a boat and will go up to [place] during the summer months. She and  live on the boat.   On review of systems, there are no further skin complaints, patient is feeling otherwise well.   ROS of the following were done and are negative: Constitutional, Eyes, Ears, Nose,   Mouth, Throat, Cardiovascular, Respiratory, GI, Genitourinary, Musculoskeletal,   Psychiatric, Endocrine, Allergic/Immunologic.    PHYSICAL EXAM:   LMP  (LMP Unknown)   Skin exam performed as follows: Type 2 skin. Mood appropriate  Alert and Oriented X 3. Well developed, well nourished in no distress.  General appearance: Normal  Head including face: Normal  Eyes: conjunctiva and lids: Normal  Mouth: Lips, teeth, gums: Normal  Neck: Normal  Chest-breast/axillae: Normal  Back: Normal  Spleen and liver: Normal  Cardiovascular: Exam of peripheral vascular system by observation for swelling, varicosities, edema: Normal  Genitalia: groin, buttocks: Normal  Extremities: digits/nails (clubbing): Normal  Eccrine and Apocrine glands: Normal  Right upper extremity: Normal  Left upper extremity: Normal  Right lower extremity: Normal  Left lower extremity: Normal  Skin: Scalp and body hair: See below    Pt deferred exam of breasts, groin, buttocks: No    Other physical findings:  1. Multiple pigmented macules on extremities and trunk  2. Multiple pigmented macules on face, trunk and extremities  3. Multiple  vascular papules on trunk, arms and legs  4. Multiple scattered keratotic plaques  5. Pink gritty papule on the left nasal tip x 1, left medial cheek x 1       Except as noted above, no other signs of skin cancer or melanoma.     ASSESSMENT/PLAN:   Benign Full skin cancer screening today. . Patient with history of none  Advised on monthly self exams and 1 year  Patient Education: Appropriate brochures given.    Multiple benign appearing melanocytic nevi on arms, legs and trunk. Discussed ABCDEs of melanoma and sunscreen.   Multiple lentigos on arms, legs and trunk. Advised benign, no treatment needed.  Multiple scattered angiomas. Advised benign, no treatment needed.   Seborrheic keratosis on arms, legs and trunk. Advised benign, no treatment needed.  Actinic keratosis on the left nasal tip x 1, left medial cheek x 1. As precancerous, cryosurgery performed. Advised on blistering and post-op care. Advised if not resolved in 1-2 months to return for evaluation              Follow-up: FSE every 1-2 years/PRN sooner    1.) Patient was asked about new and changing moles. YES  2.) Patient received a complete physical skin examination: YES  3.) Patient was counseled to perform a monthly self skin examination: YES  Scribed By: Charleen Mendoza, MS, PA-C

## 2023-10-06 NOTE — LETTER
10/6/2023         RE: Debby Chan  [address] [facility] [id number]        Dear Colleague,    Thank you for referring your patient, Debby Chan, to the [facility]. Please see a copy of my visit note below.    HPI:   Chief complaints: Debby Chan is a pleasant 73 year old female who presents for Full skin cancer screening to rule out skin cancer   Last Skin Exam: 1.5 years ago     1st Baseline: no  Personal HX of Skin Cancer: no   Personal HX of Malignant Melanoma: no   Family HX of Skin Cancer / Malignant Melanoma: Yes family members with skin CA; sister with melanoma  Personal HX of Atypical Moles:   no  Risk factors: history of sun exposure and burns  New / Changing lesions:yes a persistent rash on the arms and face  Social History: Has a boat and will go up to [place] during the summer months. She and  live on the boat.   On review of systems, there are no further skin complaints, patient is feeling otherwise well.   ROS of the following were done and are negative: Constitutional, Eyes, Ears, Nose,   Mouth, Throat, Cardiovascular, Respiratory, GI, Genitourinary, Musculoskeletal,   Psychiatric, Endocrine, Allergic/Immunologic.    PHYSICAL EXAM:   LMP  (LMP Unknown)   Skin exam performed as follows: Type 2 skin. Mood appropriate  Alert and Oriented X 3. Well developed, well nourished in no distress.  General appearance: Normal  Head including face: Normal  Eyes: conjunctiva and lids: Normal  Mouth: Lips, teeth, gums: Normal  Neck: Normal  Chest-breast/axillae: Normal  Back: Normal  Spleen and liver: Normal  Cardiovascular: Exam of peripheral vascular system by observation for swelling, varicosities, edema: Normal  Genitalia: groin, buttocks: Normal  Extremities: digits/nails (clubbing): Normal  Eccrine and Apocrine glands: Normal  Right upper extremity: Normal  Left upper extremity: Normal  Right lower extremity: Normal  Left  lower extremity: Normal  Skin: Scalp and body hair: See below    Pt deferred exam of breasts, groin, buttocks: No    Other physical findings:  1. Multiple pigmented macules on extremities and trunk  2. Multiple pigmented macules on face, trunk and extremities  3. Multiple vascular papules on trunk, arms and legs  4. Multiple scattered keratotic plaques  5. Pink gritty papule on the left nasal tip x 1, left medial cheek x 1       Except as noted above, no other signs of skin cancer or melanoma.     ASSESSMENT/PLAN:   Benign Full skin cancer screening today. . Patient with history of none  Advised on monthly self exams and 1 year  Patient Education: Appropriate brochures given.    Multiple benign appearing melanocytic nevi on arms, legs and trunk. Discussed ABCDEs of melanoma and sunscreen.   Multiple lentigos on arms, legs and trunk. Advised benign, no treatment needed.  Multiple scattered angiomas. Advised benign, no treatment needed.   Seborrheic keratosis on arms, legs and trunk. Advised benign, no treatment needed.  Actinic keratosis on the left nasal tip x 1, left medial cheek x 1. As precancerous, cryosurgery performed. Advised on blistering and post-op care. Advised if not resolved in 1-2 months to return for evaluation              Follow-up: FSE every 1-2 years/PRN sooner    1.) Patient was asked about new and changing moles. YES  2.) Patient received a complete physical skin examination: YES  3.) Patient was counseled to perform a monthly self skin examination: YES  Scribed By: Charleen Mendoza MS, PADAVID      Again, thank you for allowing me to participate in the care of your patient.        Sincerely,        Charleen Mendoza PA-C

## 2023-11-28 DIAGNOSIS — E78.00 PURE HYPERCHOLESTEROLEMIA: ICD-10-CM

## 2023-11-28 DIAGNOSIS — K21.9 GASTROESOPHAGEAL REFLUX DISEASE WITHOUT ESOPHAGITIS: ICD-10-CM

## 2023-11-28 DIAGNOSIS — I25.10 CORONARY ARTERY DISEASE INVOLVING NATIVE CORONARY ARTERY OF NATIVE HEART WITHOUT ANGINA PECTORIS: ICD-10-CM

## 2023-11-28 RX ORDER — METOPROLOL SUCCINATE 25 MG/1
25 TABLET, EXTENDED RELEASE ORAL DAILY
Qty: 90 TABLET | Refills: 0 | Status: SHIPPED | OUTPATIENT
Start: 2023-11-28 | End: 2023-12-06

## 2023-11-28 RX ORDER — OMEPRAZOLE 40 MG/1
CAPSULE, DELAYED RELEASE ORAL
Qty: 90 CAPSULE | Refills: 0 | Status: SHIPPED | OUTPATIENT
Start: 2023-11-28 | End: 2024-02-20

## 2023-11-28 RX ORDER — ATORVASTATIN CALCIUM 40 MG/1
40 TABLET, FILM COATED ORAL DAILY
Qty: 90 TABLET | Refills: 0 | Status: SHIPPED | OUTPATIENT
Start: 2023-11-28 | End: 2023-12-06

## 2023-12-06 ENCOUNTER — OFFICE VISIT (OUTPATIENT)
Dept: INTERNAL MEDICINE | Facility: CLINIC | Age: 73
End: 2023-12-06
Payer: MEDICARE

## 2023-12-06 ENCOUNTER — MYC MEDICAL ADVICE (OUTPATIENT)
Dept: INTERNAL MEDICINE | Facility: CLINIC | Age: 73
End: 2023-12-06

## 2023-12-06 VITALS
OXYGEN SATURATION: 96 % | WEIGHT: 156.9 LBS | DIASTOLIC BLOOD PRESSURE: 70 MMHG | RESPIRATION RATE: 16 BRPM | BODY MASS INDEX: 26.14 KG/M2 | HEART RATE: 81 BPM | HEIGHT: 65 IN | SYSTOLIC BLOOD PRESSURE: 128 MMHG

## 2023-12-06 DIAGNOSIS — N90.89 VULVAR IRRITATION: ICD-10-CM

## 2023-12-06 DIAGNOSIS — I10 BENIGN ESSENTIAL HYPERTENSION: ICD-10-CM

## 2023-12-06 DIAGNOSIS — Z13.1 SCREENING FOR DIABETES MELLITUS: ICD-10-CM

## 2023-12-06 DIAGNOSIS — R73.01 IMPAIRED FASTING GLUCOSE: ICD-10-CM

## 2023-12-06 DIAGNOSIS — E78.00 PURE HYPERCHOLESTEROLEMIA: ICD-10-CM

## 2023-12-06 DIAGNOSIS — Z00.00 MEDICARE ANNUAL WELLNESS VISIT, SUBSEQUENT: Primary | ICD-10-CM

## 2023-12-06 DIAGNOSIS — K59.00 CONSTIPATION, UNSPECIFIED CONSTIPATION TYPE: ICD-10-CM

## 2023-12-06 DIAGNOSIS — R21 RASH: ICD-10-CM

## 2023-12-06 DIAGNOSIS — E55.9 VITAMIN D DEFICIENCY: ICD-10-CM

## 2023-12-06 LAB
ALBUMIN SERPL BCG-MCNC: 4.7 G/DL (ref 3.5–5.2)
ALP SERPL-CCNC: 73 U/L (ref 40–150)
ALT SERPL W P-5'-P-CCNC: 26 U/L (ref 0–50)
ANION GAP SERPL CALCULATED.3IONS-SCNC: 12 MMOL/L (ref 7–15)
AST SERPL W P-5'-P-CCNC: 27 U/L (ref 0–45)
BILIRUB SERPL-MCNC: 0.7 MG/DL
BUN SERPL-MCNC: 17.5 MG/DL (ref 8–23)
CALCIUM SERPL-MCNC: 10.1 MG/DL (ref 8.8–10.2)
CHLORIDE SERPL-SCNC: 107 MMOL/L (ref 98–107)
CHOLEST SERPL-MCNC: 147 MG/DL
CREAT SERPL-MCNC: 0.93 MG/DL (ref 0.51–0.95)
DEPRECATED HCO3 PLAS-SCNC: 23 MMOL/L (ref 22–29)
EGFRCR SERPLBLD CKD-EPI 2021: 65 ML/MIN/1.73M2
FASTING STATUS PATIENT QL REPORTED: YES
GLUCOSE SERPL-MCNC: 97 MG/DL (ref 70–99)
HBA1C MFR BLD: 5.4 % (ref 0–5.6)
HDLC SERPL-MCNC: 67 MG/DL
LDLC SERPL CALC-MCNC: 59 MG/DL
NONHDLC SERPL-MCNC: 80 MG/DL
POTASSIUM SERPL-SCNC: 4.9 MMOL/L (ref 3.4–5.3)
PROT SERPL-MCNC: 7.3 G/DL (ref 6.4–8.3)
SODIUM SERPL-SCNC: 142 MMOL/L (ref 135–145)
TRIGL SERPL-MCNC: 103 MG/DL
VIT D+METAB SERPL-MCNC: 58 NG/ML (ref 20–50)

## 2023-12-06 PROCEDURE — 99214 OFFICE O/P EST MOD 30 MIN: CPT | Mod: 25 | Performed by: INTERNAL MEDICINE

## 2023-12-06 PROCEDURE — 80061 LIPID PANEL: CPT | Performed by: INTERNAL MEDICINE

## 2023-12-06 PROCEDURE — 83036 HEMOGLOBIN GLYCOSYLATED A1C: CPT | Performed by: INTERNAL MEDICINE

## 2023-12-06 PROCEDURE — 80053 COMPREHEN METABOLIC PANEL: CPT | Performed by: INTERNAL MEDICINE

## 2023-12-06 PROCEDURE — 82306 VITAMIN D 25 HYDROXY: CPT | Performed by: INTERNAL MEDICINE

## 2023-12-06 PROCEDURE — 36415 COLL VENOUS BLD VENIPUNCTURE: CPT | Performed by: INTERNAL MEDICINE

## 2023-12-06 PROCEDURE — G0439 PPPS, SUBSEQ VISIT: HCPCS | Performed by: INTERNAL MEDICINE

## 2023-12-06 RX ORDER — ATORVASTATIN CALCIUM 40 MG/1
40 TABLET, FILM COATED ORAL DAILY
Qty: 90 TABLET | Refills: 3 | Status: SHIPPED | OUTPATIENT
Start: 2023-12-06

## 2023-12-06 RX ORDER — TRIAMCINOLONE ACETONIDE 1 MG/G
OINTMENT TOPICAL
Qty: 30 G | Refills: 0 | Status: SHIPPED | OUTPATIENT
Start: 2023-12-06

## 2023-12-06 RX ORDER — IRBESARTAN 150 MG/1
150 TABLET ORAL AT BEDTIME
Qty: 90 TABLET | Refills: 3 | Status: SHIPPED | OUTPATIENT
Start: 2023-12-06

## 2023-12-06 RX ORDER — METOPROLOL SUCCINATE 25 MG/1
25 TABLET, EXTENDED RELEASE ORAL DAILY
Qty: 90 TABLET | Refills: 3 | Status: SHIPPED | OUTPATIENT
Start: 2023-12-06

## 2023-12-06 RX ORDER — DESONIDE 0.5 MG/G
CREAM TOPICAL 2 TIMES DAILY PRN
Qty: 15 G | Refills: 1 | Status: SHIPPED | OUTPATIENT
Start: 2023-12-06

## 2023-12-06 RX ORDER — POLYETHYLENE GLYCOL 3350 17 G/17G
1 POWDER, FOR SOLUTION ORAL DAILY
Qty: 850 G | Refills: 11 | Status: SHIPPED | OUTPATIENT
Start: 2023-12-06

## 2023-12-06 RX ORDER — DESONIDE 0.5 MG/G
CREAM TOPICAL 2 TIMES DAILY PRN
Qty: 15 G | Refills: 1 | OUTPATIENT
Start: 2023-12-06

## 2023-12-06 ASSESSMENT — ENCOUNTER SYMPTOMS
WEAKNESS: 0
HEMATOCHEZIA: 0
CONSTIPATION: 0
SHORTNESS OF BREATH: 1
DIARRHEA: 0
DIZZINESS: 0
BREAST MASS: 0
MYALGIAS: 0
ARTHRALGIAS: 0
FREQUENCY: 0
HEMATURIA: 0
NERVOUS/ANXIOUS: 0
SORE THROAT: 0
JOINT SWELLING: 0
PALPITATIONS: 0
COUGH: 0
HEARTBURN: 0
ABDOMINAL PAIN: 0
PARESTHESIAS: 0
EYE PAIN: 0
NAUSEA: 0
DYSURIA: 1
HEADACHES: 0
FEVER: 0
CHILLS: 0

## 2023-12-06 ASSESSMENT — ACTIVITIES OF DAILY LIVING (ADL): CURRENT_FUNCTION: NO ASSISTANCE NEEDED

## 2023-12-06 NOTE — PROGRESS NOTES
ASSESSMENT/PLAN                                                       (Z00.00) Medicare annual wellness visit, subsequent  (primary encounter diagnosis)  Comment: PMH, PSH, FH, SH, medications, allergies, immunizations, and preventative health measures reviewed and updated as appropriate.  Plan: see below for plans.      (E78.00) Pure hypercholesterolemia  (R73.01) Impaired fasting glucose  (E55.9) Vitamin D deficiency  (Z13.1) Screening for diabetes mellitus  Plan: fasting labs today.    (I10) Benign essential hypertension  Comment: well-controlled on current regimen.    Plan: continue present management; refills provided.     (R21) Rash  Plan: Refills of triamcinolone ointment sent in.    (K59.00) Constipation, unspecified constipation type  Comment: this and recent prolonged sitting are the main contributors to her hemorrhoids  Plan: avoid prolonged sitting whenever possible; START MiraLAX daily and consistently to achieve daily, soft, and easy to evacuate stools.    (N90.89) Vulvar irritation  Comment: suspect vulvar irritation is from over-cleansing and OTC products.  Plan: gentle down there skin care discussed (see AVS for details); may use desonide sparingly as needed for irritation or itching.    Appropriate preventive services were discussed with this patient, including applicable screening as appropriate for cardiovascular disease, diabetes, osteopenia/osteoporosis, and glaucoma.  As appropriate for age/gender, discussed screening for colorectal cancer, prostate cancer, breast cancer, and cervical cancer. Checklist reviewing preventive services available has been given to the patient.    Reviewed patients plan of care. The Basic Care Plan (routine screening as documented in Health Maintenance) for Debby Chan meets the Care Plan requirement. This Care Plan has been established and reviewed with the Patient.    Ida Lyles MD   [place] [address] [address] [place] [address]  [address]  T: [phone], F: [phone]    SUBJECTIVE                                                      Debby Chan is a very pleasant 73 year old female who presents for her subsequent AWV:    Current providers (other than myself): n/a     PMH, PSH, , , medications, allergies, immunizations, preventative health, and health risk assessment reviewed and updated as appropriate.    Past Medical History:   Diagnosis Date    Acid reflux disease     Benign essential hypertension     CAD (coronary artery disease)     Impaired fasting glucose     Pure hypercholesterolemia      Past Surgical History:   Procedure Laterality Date    BREAST BIOPSY, RT/LT Right 1985    benign fibroadenoma    CV CORONARY ANGIOGRAM N/A 3/10/2020    Procedure: Coronary Angiogram;  Surgeon: Daniel Huffman MD;  Location:  HEART CARDIAC CATH LAB    CV FRACTIONAL FLOW RATIO WIRE N/A 3/10/2020    Procedure: Fractional Flow Reserve;  Surgeon: Daniel Huffman MD;  Location:  HEART CARDIAC CATH LAB    CV INSTANTANEOUS WAVE-FREE RATIO N/A 3/10/2020    Procedure: Instantaneous Wave-Free Ratio;  Surgeon: Daniel Huffman MD;  Location:  HEART CARDIAC CATH LAB    CV LEFT HEART CATH N/A 3/10/2020    Procedure: Left Heart Cath;  Surgeon: Daniel Huffman MD;  Location:  HEART CARDIAC CATH LAB    CV LEFT VENTRICULOGRAM N/A 3/10/2020    Procedure: Left Ventriculogram;  Surgeon: Daniel Huffman MD;  Location:  HEART CARDIAC CATH LAB    ESOPHAGOSCOPY, GASTROSCOPY, DUODENOSCOPY (EGD), COMBINED N/A 12/21/2017    Procedure: COMBINED ESOPHAGOSCOPY, GASTROSCOPY, DUODENOSCOPY (EGD), BIOPSY SINGLE OR MULTIPLE;  gastroscopy;  Surgeon: Srinivasa Jackson MD;  Location:  GI    TUBAL LIGATION  1990     Family History   Problem Relation Age of Onset    Heart Failure Mother     Myocardial Infarction Father         later in life; s/p PCI    Prostate Cancer Father     Diabetes Type 2  Father     Skin Cancer Father     Melanoma  Sister     Uterine Cancer Paternal Aunt     Cerebrovascular Disease No family hx of     Coronary Artery Disease Early Onset No family hx of     Ovarian Cancer No family hx of     Colon Cancer No family hx of      Social History     Occupational History    Occupation: Retired from [employer]   Tobacco Use    Smoking status: Never    Smokeless tobacco: Never   Vaping Use    Vaping Use: Never used   Substance and Sexual Activity    Alcohol use: Yes     Comment: occasional    Drug use: No    Sexual activity: Not Currently   Social History Narrative    .    No kids.    Walking 4-5x/week.     Allergies   Allergen Reactions    Lisinopril Cough    Penicillins Nausea     Current Outpatient Medications   Medication Sig    Ascorbic Acid (VITAMIN C) 500 MG CAPS     aspirin 81 MG EC tablet Take 1 tablet (81 mg) by mouth daily    atorvastatin (LIPITOR) 40 MG tablet TAKE 1 TABLET DAILY    Calcium Carbonate-Vitamin D (CALCIUM + D PO) Take  by mouth. Plus magnesium    Cholecalciferol (VITAMIN D) 2000 UNITS tablet Take 1 tablet by mouth daily.    irbesartan (AVAPRO) 150 MG tablet TAKE 1 TABLET BY MOUTH AT BEDTIME    metoprolol succinate ER (TOPROL XL) 25 MG 24 hr tablet TAKE 1 TABLET DAILY    omeprazole (PRILOSEC) 40 MG DR capsule TAKE 1 CAPSULE DAILY 30-60 MINUTES BEFORE A MEAL.    triamcinolone (KENALOG) 0.1 % external ointment Apply sparingly to affected area three times daily for 14 days.    vitamin E (TOCOPHEROL) 1000 units (450 mg) CAPS capsule Take 1,000 Units by mouth daily     Immunization History   Administered Date(s) Administered    COVID-19 12+ (2023-24) (MODERNA) 11/18/2023    COVID-19 Bivalent 12+ (Pfizer) 09/27/2022    COVID-19 Monovalent 18+ (Moderna) 03/01/2021, 03/29/2021, 10/27/2021, 04/25/2022    DTP-Hib 10/12/1993, 12/13/1993, 03/01/1994    Flu 65+ Years 10/15/2021    HIB (PRP-T) 11/08/1994    Hepatitis B, Peds 08/19/1993, 10/12/1993, 03/01/1994    Historical DTP/aP 02/14/1995, 08/27/1998    Influenza (High  "Dose) 3 valent vaccine 10/04/2016, 10/17/2017, 10/23/2018, 11/12/2019, 10/06/2020    Influenza (IIV3) PF 12/03/2010    Influenza Vaccine 65+ (FLUAD) 10/15/2021, 11/11/2022, 10/19/2023    Influenza Vaccine >6 months,quad, PF 10/06/2020    MMR 11/08/1994, 08/27/1998    OPV, trivalent, live 10/12/1993, 12/15/1993, 02/14/1995, 08/27/1998    Pneumo Conj 13-V (2010&after) 08/07/2015    Pneumococcal 23 valent 10/04/2016    TDAP Vaccine (Adacel) 05/31/2012    Varicella 10/11/1995    Zoster recombinant adjuvanted (SHINGRIX) 08/01/2023, 10/19/2023     PREVENTATIVE HEALTH                                                      BMI: within normal limits   Blood pressure: well-controlled on current regimen   Breast CA screening: up to date   Colon CA screening: up to date   Lung CA screening: n/a   Dexa: up to date   Screening cholesterol: n/a - already being treated for this condition  Screening diabetes: DUE  Alcohol misuse screening: alcohol use reviewed - no intervention indicated at this time  Immunizations: reviewed; up to date     HEALTH RISK ASSESSMENT                                                      In general, how would you rate your overall physical health? good  Outside of work, how many days during the week do you exercise? 4-5 days/week  Outside of work, approximately how many minutes a day do you exercise? 30-45 minutes    If you drink alcohol do you typically have >3 drinks per day or >7 drinks per week? No  Do you usually eat at least 4 servings of fruit and vegetables a day, include whole grains & fiber and avoid regularly eating high fat or \"junk\" foods? No     Do you have any problems taking medications regularly? No  Do you have any side effects from medications? No    Assistance with daily activities: No    Safety concerns: No    Fall risk assessment: completed today (see ambulatory assessments)    Hearing concerns: YES - difficulty following a conversation in a noisy restaurant or crowded room    In the " "past 6 months, have you been bothered by leaking of urine: No    In general, how would you rate your overall mental or emotional health: good    Do you have a current opioid prescription? No  Do you use any other controlled substances or medications that are not prescribed by a provider? None    PHQ-2/PHQ-9 assessment: completed today (see ambulatory assessments)    Additional concerns today: YES - developed bothersome hemorrhoids after a long road trip and has developed perineal and vulvar irritation ; has been using multiple OTC products to no avail    OBJECTIVE                                                      /70   Pulse 81   Resp 16   Ht 1.651 m (5' 5\")   Wt 71.2 kg (156 lb 14.4 oz)   LMP  (LMP Unknown)   SpO2 96%   BMI 26.11 kg/m    Constitutional: well-appearing  Head, Ears, and Eyes: normocephalic; normal external auditory canal and pinna; tympanic membranes visualized and normal; normal lids and conjunctivae  Neck: supple, symmetric, no thyromegaly or lymphadenopathy  Respiratory: normal respiratory effort; clear to auscultation bilaterally  Cardiovascular: regular rate and rhythm; no edema  Gastrointestinal: soft, non-tender, and non-distended; no organomegaly or masses  Genitourinary: vulvar and perineal hypopigmentation, atrophy, and near maceration  Perianal exam: no external hemorrhoids, fissures, or skin tags  Musculoskeletal: normal gait and station  Psych: normal judgment and insight; normal mood and affect; recent and remote memory intact    Cognitive impairment noted: No  ---  (Note was completed, in part, with [employer] voice-recognition software. Documentation was reviewed, but some grammatical, spelling, and word errors may remain.)  "

## 2023-12-06 NOTE — PATIENT INSTRUCTIONS
Fasting labs today.    Refill sent in.    The best treatment for your hemorrhoids is improving your bowel movements such that they are daily, soft, formed, and easy to evacuate.  You can achieve this by taking Miralax daily and consistently.  You can switch to Miralax as needed once your stools are consistently daily, soft, formed, and easy to evacuate and your hemorrhoids have settled down.    To promote healthy vaginal and vulvar mo:    Avoid direct soap application to and scrubbing of the vulvar/vaginal area.     Avoid direct hot water on or prolonged soaking of the vulvar/vaginal area.     Avoid douching and using over-the-counter feminine hygiene products.    Warm water or warm soapy water running over the vulvar/vaginal area briefly, daily or every other day, is adequate for cleaning.    Keep vulvar and vaginal area dry otherwise (pat dry after showers).    Wear breathable cotton underwear and loose pants/shorts to promote airflow.     Avoid detergents with dyes and perfumes.      ---    May use desonide cream sparingly as needed to the vulvar area for irritation or itching.

## 2024-01-31 ENCOUNTER — TRANSFERRED RECORDS (OUTPATIENT)
Dept: HEALTH INFORMATION MANAGEMENT | Facility: CLINIC | Age: 74
End: 2024-01-31
Payer: MEDICARE

## 2024-02-19 DIAGNOSIS — K21.9 GASTROESOPHAGEAL REFLUX DISEASE WITHOUT ESOPHAGITIS: ICD-10-CM

## 2024-02-20 RX ORDER — OMEPRAZOLE 40 MG/1
CAPSULE, DELAYED RELEASE ORAL
Qty: 90 CAPSULE | Refills: 2 | Status: SHIPPED | OUTPATIENT
Start: 2024-02-20

## 2024-03-15 DIAGNOSIS — I25.10 CORONARY ARTERY DISEASE INVOLVING NATIVE CORONARY ARTERY OF NATIVE HEART WITHOUT ANGINA PECTORIS: Primary | ICD-10-CM

## 2024-03-15 DIAGNOSIS — E78.5 HYPERLIPIDEMIA LDL GOAL <70: ICD-10-CM

## 2024-03-18 ENCOUNTER — LAB (OUTPATIENT)
Dept: LAB | Facility: CLINIC | Age: 74
End: 2024-03-18
Payer: MEDICARE

## 2024-03-18 DIAGNOSIS — I25.10 CORONARY ARTERY DISEASE INVOLVING NATIVE CORONARY ARTERY OF NATIVE HEART WITHOUT ANGINA PECTORIS: ICD-10-CM

## 2024-03-18 DIAGNOSIS — E78.5 HYPERLIPIDEMIA LDL GOAL <70: ICD-10-CM

## 2024-03-18 LAB
ALT SERPL W P-5'-P-CCNC: 27 U/L (ref 0–50)
ANION GAP SERPL CALCULATED.3IONS-SCNC: 11 MMOL/L (ref 7–15)
BUN SERPL-MCNC: 16.2 MG/DL (ref 8–23)
CALCIUM SERPL-MCNC: 9.6 MG/DL (ref 8.8–10.2)
CHLORIDE SERPL-SCNC: 107 MMOL/L (ref 98–107)
CHOLEST SERPL-MCNC: 155 MG/DL
CREAT SERPL-MCNC: 0.93 MG/DL (ref 0.51–0.95)
DEPRECATED HCO3 PLAS-SCNC: 23 MMOL/L (ref 22–29)
EGFRCR SERPLBLD CKD-EPI 2021: 65 ML/MIN/1.73M2
FASTING STATUS PATIENT QL REPORTED: YES
GLUCOSE SERPL-MCNC: 98 MG/DL (ref 70–99)
HDLC SERPL-MCNC: 67 MG/DL
LDLC SERPL CALC-MCNC: 54 MG/DL
NONHDLC SERPL-MCNC: 88 MG/DL
POTASSIUM SERPL-SCNC: 4.3 MMOL/L (ref 3.4–5.3)
SODIUM SERPL-SCNC: 141 MMOL/L (ref 135–145)
TRIGL SERPL-MCNC: 171 MG/DL

## 2024-03-18 PROCEDURE — 80061 LIPID PANEL: CPT | Performed by: INTERNAL MEDICINE

## 2024-03-18 PROCEDURE — 36415 COLL VENOUS BLD VENIPUNCTURE: CPT | Performed by: INTERNAL MEDICINE

## 2024-03-18 PROCEDURE — 84460 ALANINE AMINO (ALT) (SGPT): CPT | Performed by: INTERNAL MEDICINE

## 2024-03-18 PROCEDURE — 80048 BASIC METABOLIC PNL TOTAL CA: CPT | Performed by: INTERNAL MEDICINE

## 2024-03-19 ENCOUNTER — LAB (OUTPATIENT)
Dept: LAB | Facility: CLINIC | Age: 74
End: 2024-03-19
Payer: MEDICARE

## 2024-03-19 ENCOUNTER — OFFICE VISIT (OUTPATIENT)
Dept: CARDIOLOGY | Facility: CLINIC | Age: 74
End: 2024-03-19
Payer: MEDICARE

## 2024-03-19 VITALS
WEIGHT: 156.1 LBS | SYSTOLIC BLOOD PRESSURE: 129 MMHG | HEART RATE: 77 BPM | DIASTOLIC BLOOD PRESSURE: 73 MMHG | HEIGHT: 65 IN | BODY MASS INDEX: 26.01 KG/M2 | OXYGEN SATURATION: 97 %

## 2024-03-19 DIAGNOSIS — R42 DIZZINESS: ICD-10-CM

## 2024-03-19 DIAGNOSIS — I25.10 CORONARY ARTERY DISEASE INVOLVING NATIVE CORONARY ARTERY OF NATIVE HEART WITHOUT ANGINA PECTORIS: Primary | ICD-10-CM

## 2024-03-19 DIAGNOSIS — I10 ESSENTIAL HYPERTENSION: ICD-10-CM

## 2024-03-19 DIAGNOSIS — E78.5 HYPERLIPIDEMIA LDL GOAL <70: ICD-10-CM

## 2024-03-19 DIAGNOSIS — R00.2 PALPITATIONS: ICD-10-CM

## 2024-03-19 LAB
MAGNESIUM SERPL-MCNC: 1.9 MG/DL (ref 1.7–2.3)
TSH SERPL DL<=0.005 MIU/L-ACNC: 3.86 UIU/ML (ref 0.3–4.2)

## 2024-03-19 PROCEDURE — 99214 OFFICE O/P EST MOD 30 MIN: CPT | Performed by: INTERNAL MEDICINE

## 2024-03-19 PROCEDURE — 36415 COLL VENOUS BLD VENIPUNCTURE: CPT | Performed by: INTERNAL MEDICINE

## 2024-03-19 PROCEDURE — 84443 ASSAY THYROID STIM HORMONE: CPT | Performed by: INTERNAL MEDICINE

## 2024-03-19 PROCEDURE — 83735 ASSAY OF MAGNESIUM: CPT | Performed by: INTERNAL MEDICINE

## 2024-03-19 NOTE — LETTER
3/19/2024    Ida Lyles MD  [address] [facility] [address]    RE: Debby Chan       Dear Colleague,     I had the pleasure of seeing Debby Chan in the [facility].  HPI and Plan:   It is my pleasure to see your patient Debby Herrera who is a 73-year-old patient with a past history of nonflow-limiting coronary artery disease and a history of essential hypertension.  She also has a history of noncardiac chest discomfort which is atypical.    With that background in mind the patient presents with somewhat nonspecific and unusual symptoms.  She notices when she walks that she gets the vibration feeling in her chest which then can move around her body to her legs in particular but also to her arms.  It lasts approximately a minute.  She wonders if it is due to over walking.  She tends to walk just over 3 miles per day.  She does not feel chest pains or chest pressure associated with this.  This occurs about 3 times a week.    Independent of the previous symptom, the patient also occasionally gets episodes of border lightheadedness.  She describes this as a feeling of her surroundings moving around.  She does not feel like she is going to lose consciousness.  These symptoms occur independently of the vibration sensation that she has had in the past.  She has never had syncope or near syncope.    Finally, the patient has been complaining of left lower costal discomfort which occurs lies down at nighttime especially when she turns on her left side.  These symptoms appear to be positional and sounds distinctly musculoskeletal.    Impression:  1.  Vibration type sensation which is somewhat unusual but arrhythmia certainly needs to be ruled out.  2.  Lightheadedness which has symptoms somewhat suggestive of vertigo but again arrhythmia should be ruled out.  3.  Musculoskeletal chest discomfort in the lower left costal margin.  4.  Asymptomatic with respect to  coronary artery disease  5.  Excellent lipid profile with LDL and HDL were within secondary prevention guidelines with mild hypertriglyceridemia.    Plan:  1.  We will have the patient wear an event monitor for 21 days to determine if any arrhythmias associated with her symptoms.  2.  We will check a serum magnesium and serum TSH.  3.  I will have the patient return with the results of these tests with one of our ROBER's in a month's time.  I have also made arrangements for the patient to follow-up in 1 years time.    As always the patient is been told to contact me if she is any questions or any concerns.    Ranulfo Martin MD, FACC, FRCPI      Orders Placed This Encounter   Procedures    Basic metabolic panel    Lipid Profile    ALT    Magnesium    TSH    Follow-Up with Cardiology ROBER    Follow-Up with Cardiology    Cardiac Event Monitor Adult Pediatric       Orders Placed This Encounter   Medications    Multiple Vitamins-Minerals (WOMENS MULTIVITAMIN PO)     Sig: Take by mouth daily OTC       There are no discontinued medications.      Encounter Diagnoses   Name Primary?    Coronary artery disease involving native coronary artery of native heart without angina pectoris Yes    Hyperlipidemia LDL goal <70     Essential hypertension     Palpitations     Dizziness        CURRENT MEDICATIONS:  Current Outpatient Medications   Medication Sig Dispense Refill    Ascorbic Acid (VITAMIN C) 500 MG CAPS Take by mouth daily OTC      aspirin 81 MG EC tablet Take 1 tablet (81 mg) by mouth daily      atorvastatin (LIPITOR) 40 MG tablet Take 1 tablet (40 mg) by mouth daily 90 tablet 3    Calcium Carbonate-Vitamin D (CALCIUM + D PO) Take  by mouth. Plus magnesium      Cholecalciferol (VITAMIN D) 2000 UNITS tablet Take 1 tablet by mouth daily.      desonide (DESOWEN) 0.05 % external cream Apply topically 2 times daily as needed (vulvar irrittation) DO NOT USE FOR MORE THAN 14 DAYS IN A ROW 15 g 1    irbesartan (AVAPRO) 150 MG  tablet Take 1 tablet (150 mg) by mouth at bedtime 90 tablet 3    metoprolol succinate ER (TOPROL XL) 25 MG 24 hr tablet Take 1 tablet (25 mg) by mouth daily 90 tablet 3    Multiple Vitamins-Minerals (WOMENS MULTIVITAMIN PO) Take by mouth daily OTC      omeprazole (PRILOSEC) 40 MG DR capsule TAKE 1 CAPSULE DAILY 30-60 MINUTES BEFORE A MEAL. 90 capsule 2    triamcinolone (KENALOG) 0.1 % external ointment Apply sparingly to affected area three times daily for 14 days. (Patient taking differently: Apply topically as needed Apply sparingly to affected area three times daily for 14 days.) 30 g 0    vitamin E (TOCOPHEROL) 1000 units (450 mg) CAPS capsule Take 1,000 Units by mouth daily      polyethylene glycol (MIRALAX) 17 GM/Dose powder Take 17 g (1 Capful) by mouth daily (Patient not taking: Reported on 3/19/2024) 850 g 11       ALLERGIES     Allergies   Allergen Reactions    Lisinopril Cough    Penicillins Nausea       PAST MEDICAL HISTORY:  Past Medical History:   Diagnosis Date    Acid reflux disease     Benign essential hypertension     CAD (coronary artery disease)     Pure hypercholesterolemia        PAST SURGICAL HISTORY:  Past Surgical History:   Procedure Laterality Date    BREAST BIOPSY, RT/LT Right 01/01/1985    benign fibroadenoma    CATARACT EXTRACTION, BILATERAL Bilateral 2023    CV CORONARY ANGIOGRAM N/A 03/10/2020    Procedure: Coronary Angiogram;  Surgeon: Daniel Huffman MD;  Location:  HEART CARDIAC CATH LAB    CV FRACTIONAL FLOW RATIO WIRE N/A 03/10/2020    Procedure: Fractional Flow Reserve;  Surgeon: Daniel Huffman MD;  Location:  HEART CARDIAC CATH LAB    CV INSTANTANEOUS WAVE-FREE RATIO N/A 03/10/2020    Procedure: Instantaneous Wave-Free Ratio;  Surgeon: Daniel Huffman MD;  Location:  HEART CARDIAC CATH LAB    CV LEFT HEART CATH N/A 03/10/2020    Procedure: Left Heart Cath;  Surgeon: Daniel Huffman MD;  Location:  HEART CARDIAC CATH LAB    CV LEFT VENTRICULOGRAM N/A  03/10/2020    Procedure: Left Ventriculogram;  Surgeon: Daniel Huffman MD;  Location:  HEART CARDIAC CATH LAB    ESOPHAGOSCOPY, GASTROSCOPY, DUODENOSCOPY (EGD), COMBINED N/A 12/21/2017    Procedure: COMBINED ESOPHAGOSCOPY, GASTROSCOPY, DUODENOSCOPY (EGD), BIOPSY SINGLE OR MULTIPLE;  gastroscopy;  Surgeon: Srinivasa Jackson MD;  Location:  GI    TUBAL LIGATION  01/01/1990       FAMILY HISTORY:  Family History   Problem Relation Age of Onset    Heart Failure Mother     Myocardial Infarction Father         later in life; s/p PCI    Prostate Cancer Father     Diabetes Type 2  Father     Skin Cancer Father     Melanoma Sister     Uterine Cancer Paternal Aunt     Cerebrovascular Disease No family hx of     Coronary Artery Disease Early Onset No family hx of     Ovarian Cancer No family hx of     Colon Cancer No family hx of        SOCIAL HISTORY:  Social History     Socioeconomic History    Marital status:      Spouse name: None    Number of children: None    Years of education: None    Highest education level: None   Occupational History    Occupation: Retired from [employer]   Tobacco Use    Smoking status: Never    Smokeless tobacco: Never   Vaping Use    Vaping Use: Never used   Substance and Sexual Activity    Alcohol use: Yes     Comment: occasional    Drug use: No    Sexual activity: Not Currently   Other Topics Concern    Parent/sibling w/ CABG, MI or angioplasty before 65F 55M? No   Social History Narrative    .    No kids.    Walking 4-5x/week.     Social Determinants of Health     Financial Resource Strain: Low Risk  (12/6/2023)    Financial Resource Strain     Within the past 12 months, have you or your family members you live with been unable to get utilities (heat, electricity) when it was really needed?: No   Food Insecurity: Low Risk  (12/6/2023)    Food Insecurity     Within the past 12 months, did you worry that your food would run out before you got money to buy more?: No     Within the  "past 12 months, did the food you bought just not last and you didn t have money to get more?: No   Transportation Needs: Low Risk  (12/6/2023)    Transportation Needs     Within the past 12 months, has lack of transportation kept you from medical appointments, getting your medicines, non-medical meetings or appointments, work, or from getting things that you need?: No   Interpersonal Safety: Low Risk  (12/6/2023)    Interpersonal Safety     Do you feel physically and emotionally safe where you currently live?: Yes     Within the past 12 months, have you been hit, slapped, kicked or otherwise physically hurt by someone?: No     Within the past 12 months, have you been humiliated or emotionally abused in other ways by your partner or ex-partner?: No   Housing Stability: Low Risk  (12/6/2023)    Housing Stability     Do you have housing? : Yes     Are you worried about losing your housing?: No       Review of Systems:  Skin:          Eyes:         ENT:         Respiratory:          Cardiovascular:         Gastroenterology:        Genitourinary:         Musculoskeletal:         Neurologic:         Psychiatric:         Heme/Lymph/Imm:         Endocrine:           Physical Exam:  Vitals: /73   Pulse 77   Ht 1.651 m (5' 5\")   Wt 70.8 kg (156 lb 1.6 oz)   LMP  (LMP Unknown)   SpO2 97%   BMI 25.98 kg/m      Constitutional:  cooperative, alert and oriented, well developed, well nourished, in no acute distress;cooperative        Skin:  warm and dry to the touch, no apparent skin lesions or masses noted          Head:  normocephalic, no masses or lesions        Eyes:  pupils equal and round        Lymph:No Cervical lymphadenopathy present     ENT:  no pallor or cyanosis        Neck:  carotid pulses are full and equal bilaterally, JVP normal, no carotid bruit        Respiratory:  normal breath sounds, clear to auscultation, normal A-P diameter, normal symmetry, normal respiratory excursion, no use of accessory " muscles         Cardiac: regular rhythm, normal S1/S2, no S3 or S4, apical impulse not displaced, no murmurs, gallops or rubs                pulses full and equal                                        GI:  not assessed this visit        Extremities and Muscular Skeletal:  no deformities, clubbing, cyanosis, erythema observed;no edema              Neurological:  no gross motor deficits;affect appropriate        Psych:  Alert and Oriented x 3        CC  No referring provider defined for this encounter.                  Thank you for allowing me to participate in the care of your patient.      Sincerely,     Ranulfo Martin MD, MD     [facility]

## 2024-03-19 NOTE — PROGRESS NOTES
HPI and Plan:   It is my pleasure to see your patient Debby Herrera who is a 73-year-old patient with a past history of nonflow-limiting coronary artery disease and a history of essential hypertension.  She also has a history of noncardiac chest discomfort which is atypical.    With that background in mind the patient presents with somewhat nonspecific and unusual symptoms.  She notices when she walks that she gets the vibration feeling in her chest which then can move around her body to her legs in particular but also to her arms.  It lasts approximately a minute.  She wonders if it is due to over walking.  She tends to walk just over 3 miles per day.  She does not feel chest pains or chest pressure associated with this.  This occurs about 3 times a week.    Independent of the previous symptom, the patient also occasionally gets episodes of border lightheadedness.  She describes this as a feeling of her surroundings moving around.  She does not feel like she is going to lose consciousness.  These symptoms occur independently of the vibration sensation that she has had in the past.  She has never had syncope or near syncope.    Finally, the patient has been complaining of left lower costal discomfort which occurs lies down at nighttime especially when she turns on her left side.  These symptoms appear to be positional and sounds distinctly musculoskeletal.    Impression:  1.  Vibration type sensation which is somewhat unusual but arrhythmia certainly needs to be ruled out.  2.  Lightheadedness which has symptoms somewhat suggestive of vertigo but again arrhythmia should be ruled out.  3.  Musculoskeletal chest discomfort in the lower left costal margin.  4.  Asymptomatic with respect to coronary artery disease  5.  Excellent lipid profile with LDL and HDL were within secondary prevention guidelines with mild hypertriglyceridemia.    Plan:  1.  We will have the patient wear an event monitor for 21 days to  determine if any arrhythmias associated with her symptoms.  2.  We will check a serum magnesium and serum TSH.  3.  I will have the patient return with the results of these tests with one of our ROEBR's in a month's time.  I have also made arrangements for the patient to follow-up in 1 years time.    As always the patient is been told to contact me if she is any questions or any concerns.    Ranulfo Martin MD, FACC, FRCPI      Orders Placed This Encounter   Procedures    Basic metabolic panel    Lipid Profile    ALT    Magnesium    TSH    Follow-Up with Cardiology ROBER    Follow-Up with Cardiology    Cardiac Event Monitor Adult Pediatric       Orders Placed This Encounter   Medications    Multiple Vitamins-Minerals (WOMENS MULTIVITAMIN PO)     Sig: Take by mouth daily OTC       There are no discontinued medications.      Encounter Diagnoses   Name Primary?    Coronary artery disease involving native coronary artery of native heart without angina pectoris Yes    Hyperlipidemia LDL goal <70     Essential hypertension     Palpitations     Dizziness        CURRENT MEDICATIONS:  Current Outpatient Medications   Medication Sig Dispense Refill    Ascorbic Acid (VITAMIN C) 500 MG CAPS Take by mouth daily OTC      aspirin 81 MG EC tablet Take 1 tablet (81 mg) by mouth daily      atorvastatin (LIPITOR) 40 MG tablet Take 1 tablet (40 mg) by mouth daily 90 tablet 3    Calcium Carbonate-Vitamin D (CALCIUM + D PO) Take  by mouth. Plus magnesium      Cholecalciferol (VITAMIN D) 2000 UNITS tablet Take 1 tablet by mouth daily.      desonide (DESOWEN) 0.05 % external cream Apply topically 2 times daily as needed (vulvar irrittation) DO NOT USE FOR MORE THAN 14 DAYS IN A ROW 15 g 1    irbesartan (AVAPRO) 150 MG tablet Take 1 tablet (150 mg) by mouth at bedtime 90 tablet 3    metoprolol succinate ER (TOPROL XL) 25 MG 24 hr tablet Take 1 tablet (25 mg) by mouth daily 90 tablet 3    Multiple Vitamins-Minerals (WOMENS MULTIVITAMIN PO)  Take by mouth daily OTC      omeprazole (PRILOSEC) 40 MG DR capsule TAKE 1 CAPSULE DAILY 30-60 MINUTES BEFORE A MEAL. 90 capsule 2    triamcinolone (KENALOG) 0.1 % external ointment Apply sparingly to affected area three times daily for 14 days. (Patient taking differently: Apply topically as needed Apply sparingly to affected area three times daily for 14 days.) 30 g 0    vitamin E (TOCOPHEROL) 1000 units (450 mg) CAPS capsule Take 1,000 Units by mouth daily      polyethylene glycol (MIRALAX) 17 GM/Dose powder Take 17 g (1 Capful) by mouth daily (Patient not taking: Reported on 3/19/2024) 850 g 11       ALLERGIES     Allergies   Allergen Reactions    Lisinopril Cough    Penicillins Nausea       PAST MEDICAL HISTORY:  Past Medical History:   Diagnosis Date    Acid reflux disease     Benign essential hypertension     CAD (coronary artery disease)     Pure hypercholesterolemia        PAST SURGICAL HISTORY:  Past Surgical History:   Procedure Laterality Date    BREAST BIOPSY, RT/LT Right 01/01/1985    benign fibroadenoma    CATARACT EXTRACTION, BILATERAL Bilateral 2023    CV CORONARY ANGIOGRAM N/A 03/10/2020    Procedure: Coronary Angiogram;  Surgeon: Daniel Huffman MD;  Location: [facility] CARDIAC CATH LAB    CV FRACTIONAL FLOW RATIO WIRE N/A 03/10/2020    Procedure: Fractional Flow Reserve;  Surgeon: Daniel Huffman MD;  Location: [facility] CARDIAC CATH LAB    CV INSTANTANEOUS WAVE-FREE RATIO N/A 03/10/2020    Procedure: Instantaneous Wave-Free Ratio;  Surgeon: Daniel Huffman MD;  Location:  HEART CARDIAC CATH LAB    CV LEFT HEART CATH N/A 03/10/2020    Procedure: Left Heart Cath;  Surgeon: Daniel Huffman MD;  Location: [facility] CARDIAC CATH LAB    CV LEFT VENTRICULOGRAM N/A 03/10/2020    Procedure: Left Ventriculogram;  Surgeon: Daniel Huffman MD;  Location:  HEART CARDIAC CATH LAB    ESOPHAGOSCOPY, GASTROSCOPY, DUODENOSCOPY (EGD), COMBINED N/A 12/21/2017    Procedure: COMBINED  ESOPHAGOSCOPY, GASTROSCOPY, DUODENOSCOPY (EGD), BIOPSY SINGLE OR MULTIPLE;  gastroscopy;  Surgeon: Srinivasa Jackson MD;  Location:  GI    TUBAL LIGATION  01/01/1990       FAMILY HISTORY:  Family History   Problem Relation Age of Onset    Heart Failure Mother     Myocardial Infarction Father         later in life; s/p PCI    Prostate Cancer Father     Diabetes Type 2  Father     Skin Cancer Father     Melanoma Sister     Uterine Cancer Paternal Aunt     Cerebrovascular Disease No family hx of     Coronary Artery Disease Early Onset No family hx of     Ovarian Cancer No family hx of     Colon Cancer No family hx of        SOCIAL HISTORY:  Social History     Socioeconomic History    Marital status:      Spouse name: None    Number of children: None    Years of education: None    Highest education level: None   Occupational History    Occupation: Retired from [employer]   Tobacco Use    Smoking status: Never    Smokeless tobacco: Never   Vaping Use    Vaping Use: Never used   Substance and Sexual Activity    Alcohol use: Yes     Comment: occasional    Drug use: No    Sexual activity: Not Currently   Other Topics Concern    Parent/sibling w/ CABG, MI or angioplasty before 65F 55M? No   Social History Narrative    .    No kids.    Walking 4-5x/week.     Social Determinants of Health     Financial Resource Strain: Low Risk  (12/6/2023)    Financial Resource Strain     Within the past 12 months, have you or your family members you live with been unable to get utilities (heat, electricity) when it was really needed?: No   Food Insecurity: Low Risk  (12/6/2023)    Food Insecurity     Within the past 12 months, did you worry that your food would run out before you got money to buy more?: No     Within the past 12 months, did the food you bought just not last and you didn t have money to get more?: No   Transportation Needs: Low Risk  (12/6/2023)    Transportation Needs     Within the past 12 months, has lack of  "transportation kept you from medical appointments, getting your medicines, non-medical meetings or appointments, work, or from getting things that you need?: No   Interpersonal Safety: Low Risk  (12/6/2023)    Interpersonal Safety     Do you feel physically and emotionally safe where you currently live?: Yes     Within the past 12 months, have you been hit, slapped, kicked or otherwise physically hurt by someone?: No     Within the past 12 months, have you been humiliated or emotionally abused in other ways by your partner or ex-partner?: No   Housing Stability: Low Risk  (12/6/2023)    Housing Stability     Do you have housing? : Yes     Are you worried about losing your housing?: No       Review of Systems:  Skin:          Eyes:         ENT:         Respiratory:          Cardiovascular:         Gastroenterology:        Genitourinary:         Musculoskeletal:         Neurologic:         Psychiatric:         Heme/Lymph/Imm:         Endocrine:           Physical Exam:  Vitals: /73   Pulse 77   Ht 1.651 m (5' 5\")   Wt 70.8 kg (156 lb 1.6 oz)   LMP  (LMP Unknown)   SpO2 97%   BMI 25.98 kg/m      Constitutional:  cooperative, alert and oriented, well developed, well nourished, in no acute distress;cooperative        Skin:  warm and dry to the touch, no apparent skin lesions or masses noted          Head:  normocephalic, no masses or lesions        Eyes:  pupils equal and round        Lymph:No Cervical lymphadenopathy present     ENT:  no pallor or cyanosis        Neck:  carotid pulses are full and equal bilaterally, JVP normal, no carotid bruit        Respiratory:  normal breath sounds, clear to auscultation, normal A-P diameter, normal symmetry, normal respiratory excursion, no use of accessory muscles         Cardiac: regular rhythm, normal S1/S2, no S3 or S4, apical impulse not displaced, no murmurs, gallops or rubs                pulses full and equal                                        GI:  not " assessed this visit        Extremities and Muscular Skeletal:  no deformities, clubbing, cyanosis, erythema observed;no edema              Neurological:  no gross motor deficits;affect appropriate        Psych:  Alert and Oriented x 3        CC  No referring provider defined for this encounter.

## 2024-03-20 ENCOUNTER — HOSPITAL ENCOUNTER (OUTPATIENT)
Dept: CARDIOLOGY | Facility: CLINIC | Age: 74
Discharge: HOME | End: 2024-03-20
Attending: INTERNAL MEDICINE | Admitting: INTERNAL MEDICINE
Payer: MEDICARE

## 2024-03-20 DIAGNOSIS — R00.2 PALPITATIONS: ICD-10-CM

## 2024-03-20 DIAGNOSIS — R42 DIZZINESS: ICD-10-CM

## 2024-03-20 PROCEDURE — 93270 REMOTE 30 DAY ECG REV/REPORT: CPT

## 2024-03-20 PROCEDURE — 93272 ECG/REVIEW INTERPRET ONLY: CPT | Performed by: INTERNAL MEDICINE

## 2024-04-12 ENCOUNTER — DOCUMENTATION ONLY (OUTPATIENT)
Dept: CARDIOLOGY | Facility: CLINIC | Age: 74
End: 2024-04-12
Payer: MEDICARE

## 2024-04-12 NOTE — PROGRESS NOTES
[facility] end of service summary reviewed and signed by Dr. Nghia Grant.  Summary sent to scanning.  KALI Martinez RN

## 2024-04-23 ENCOUNTER — OFFICE VISIT (OUTPATIENT)
Dept: CARDIOLOGY | Facility: CLINIC | Age: 74
End: 2024-04-23
Payer: MEDICARE

## 2024-04-23 VITALS
BODY MASS INDEX: 25.61 KG/M2 | SYSTOLIC BLOOD PRESSURE: 118 MMHG | DIASTOLIC BLOOD PRESSURE: 61 MMHG | OXYGEN SATURATION: 97 % | HEART RATE: 67 BPM | HEIGHT: 65 IN | WEIGHT: 153.7 LBS

## 2024-04-23 DIAGNOSIS — E78.5 HYPERLIPIDEMIA LDL GOAL <70: ICD-10-CM

## 2024-04-23 DIAGNOSIS — R00.2 PALPITATIONS: ICD-10-CM

## 2024-04-23 DIAGNOSIS — I25.10 CORONARY ARTERY DISEASE INVOLVING NATIVE CORONARY ARTERY OF NATIVE HEART WITHOUT ANGINA PECTORIS: Primary | ICD-10-CM

## 2024-04-23 DIAGNOSIS — I10 ESSENTIAL HYPERTENSION: ICD-10-CM

## 2024-04-23 DIAGNOSIS — R42 DIZZINESS: ICD-10-CM

## 2024-04-23 PROCEDURE — 99214 OFFICE O/P EST MOD 30 MIN: CPT | Performed by: INTERNAL MEDICINE

## 2024-04-23 NOTE — LETTER
[facility]  Hospitalist Progress Note  Shabbir Vickers M.D., M.B.A.   05/15/2023    Reason for Stay/active problem list      Generalized weakness and mechanical fall at the group home, failure to thrive at group home    Acute on chronic hypoxic and hypercarbic respiratory failure         Assessment and Plan:        Summary of Stay:   Tre Vazquez is a 48 year old male with PMH including palsy, mild intellectual disability, congenital left eye blindness, seizure disorder with chronically elevated ammonia who remains on Depakote among other antiepileptics, borderline personality disorder, schizoaffective and ANA as well as obesity hypoventilation syndrome chronically on BiPAP who was recently admitted from 5/4 to 5/11 for hypercapnic respiratory failure and encephalopathy who presents with a fall not long after arriving back at his group home.      The patient does not recall much but just recalls winding up on the ground.  He was brought back to the ER and placed on BiPAP on route.  Here he underwent lab work-up which was notable for grossly stable BMP with carbon dioxide level of 39, stable CBC with mild anemia, normal lactic acid but venous blood gas showing PCO2 of approximately 90 with pH of 7.27.  His baseline PCO2 is around 80.  Influenza and COVID-19 testing were negative as well as troponin and BNP.  CT head and neck were negative for traumatic injury and CT PE protocol was negative for PE or pneumonia.       Problem List with Assessment and Plan:    Assessment and Plan:   1. Acute on chronic hypercapnic respiratory failure:  -- Baseline PCO2 seems to be in the 70s to 80 range.  He was seen by pulmonary medicine last admission.  it appears that the rep from [facility] was contacted last admission, and that the plan was for him to resume trilogy at home with the following settings: (Wesley from [facility] was contacted at [phone]). IL max 25, PS minimum 5, PS maximum 20, EPAP minimum 8, EPAP  4/23/2024    Ida Lyles MD  [address] [facility] [address]    RE: Debby Chan       Dear Colleague,     I had the pleasure of seeing Debby Chan in the [facility].  HPI and Plan:   Is my pleasure to see your patient Debby Chan she is a pleasant 73-year-old patient with moderate nonflow-limiting coronary artery disease who also is a history of hyperlipidemia.  If remember when I saw this patient in March she was complaining of tachycardia.  We had the patient wear a event monitor for 21 days.  This showed that the patient did not have reproducible arrhythmias when she was complaining of tachycardia, chest discomfort or dizziness.  The vast majority of the time when the patient was complaining of a racing heart the patient's rhythm and rate was well within the normal range and the patient was not tachycardic.  Patient had occasional PVCs and PACs many of which were asymptomatic.  So we can clearly say that there are not reproducible arrhythmias or tachycardia when the patient is complaining of her symptoms.  She was well reassured by this.  The patient does complain of some symptoms of stress which certainly could give the impression of tachycardia or chest pressure.  Her lipids were excellent with the exception of borderline hypertriglyceridemia.  Her blood pressure as you can see below is also very well-controlled.    Impression:  1.  Nonreproducible arrhythmias when she has symptoms of tachycardia, chest pain and dizziness.  2.  Atypical chest discomfort which virtually always occurs at rest and is not associated with exertion and the patient does walk 4 miles a day typically without issue   3.  With the exception of borderline triglycerides the lipids are excellent with no evidence of hepatic toxicity.  4.  Normotensive.    Plan:  1.  We will continue the patient on her present medications.  2.  I have advised the patient to try some  destressing  maximum 15, tidal volume 300, rate 16, I-, time auto, FiO2 6 L/min while sleeping.  -- Patient was admitted to ICU as he was requiring continuous BiPAP for close monitoring and treatment.  --Patient was able to wean off the BiPAP and his mental status significantly improved.  -- Currently patient is on 3 L of oxygen via nasal cannula, doing well, alert and eating.  His VBG showed pH of 7.35, venous PCO2 of 69 down.  Patient was seen by Dr. Johnson who stated that patient is at his baseline tolerating nocturnal BiPAP.  He suggested seizure medication changes as below.  Dr. Johnson felt that the patient is not able to be cared for at group home facility and may need alternative placement. Looking into TCU. We will continue supplemental oxygen as needed, nocturnal BiPAP, may transfer out of the ICU.      2.   History of seizure disorder, hyperammonemia while on Depakote:   --He is also on numerous other antiepileptic medications including Lamictal 200 mg twice daily, Keppra 1000 mg in the morning and 1500 mg at bedtime and zonisamide 100 mg in the morning and 200 mg at bedtime.  --His Depakote was on hold and his ammonia was monitored.  Serum ammonia was 85 on May 12.  --Currently no evidence of seizure activity.  Patient is back to his baseline.  No evidence of encephalopathy.  -- Patient was seen by Dr. Zimmerman's who recommended to stop Tegretol in the setting of chronic respiratory acidosis.  Plan to continue other antiepileptic medications-Lamictal, Keppra, Zonegran.  His Seroquel was decreased to 150 mg  -- Patient may not  need neurology consultation at this time.  Consider neurology consultation if he has recurrent seizures.  Monitor ammonia level, continue seizure precautions.  -- Patient reported to nurse today that he remembered having a 20 minute seizure. Was able to explain everything that happened during it.  -- Had previously been scheduled for outpatient EEG, which we can consider doing here since he is  strategies.  This should help her symptoms I would feel  3.  I will have the patient return to see me with lipid profile and basic metabolic profile but as always has been told to contact me if she has any questions or any concerns.    Ranulfo Martin MD, FACC, FRCP I    Orders Placed This Encounter   Procedures    Basic metabolic panel    Lipid Profile    ALT    Follow-Up with Cardiology       No orders of the defined types were placed in this encounter.      There are no discontinued medications.      Encounter Diagnoses   Name Primary?    Palpitations     Dizziness     Coronary artery disease involving native coronary artery of native heart without angina pectoris Yes    Hyperlipidemia LDL goal <70     Essential hypertension        CURRENT MEDICATIONS:  Current Outpatient Medications   Medication Sig Dispense Refill    Ascorbic Acid (VITAMIN C) 500 MG CAPS Take by mouth daily OTC      aspirin 81 MG EC tablet Take 1 tablet (81 mg) by mouth daily      atorvastatin (LIPITOR) 40 MG tablet Take 1 tablet (40 mg) by mouth daily 90 tablet 3    Calcium Carbonate-Vitamin D (CALCIUM + D PO) Take  by mouth. Plus magnesium      Cholecalciferol (VITAMIN D) 2000 UNITS tablet Take 1 tablet by mouth daily.      desonide (DESOWEN) 0.05 % external cream Apply topically 2 times daily as needed (vulvar irrittation) DO NOT USE FOR MORE THAN 14 DAYS IN A ROW 15 g 1    irbesartan (AVAPRO) 150 MG tablet Take 1 tablet (150 mg) by mouth at bedtime 90 tablet 3    metoprolol succinate ER (TOPROL XL) 25 MG 24 hr tablet Take 1 tablet (25 mg) by mouth daily 90 tablet 3    Multiple Vitamins-Minerals (WOMENS MULTIVITAMIN PO) Take by mouth daily OTC      omeprazole (PRILOSEC) 40 MG DR capsule TAKE 1 CAPSULE DAILY 30-60 MINUTES BEFORE A MEAL. 90 capsule 2    polyethylene glycol (MIRALAX) 17 GM/Dose powder Take 17 g (1 Capful) by mouth daily (Patient taking differently: Take 1 Capful by mouth as needed) 850 g 11    triamcinolone (KENALOG) 0.1 %  "pending placement     3.   History of schizoaffective disorder, borderline personality: Plan to continue his home Seroquel at 150 mg in the evening as well as BuSpar and Celexa.     4.   Toxic metabolic encephalopathy: Mental status seems to be at baseline     5.   Social: Reportedly he is his own decision maker   Compliance seems to be an issue to some extent.     6..  Fall: Obesity limits his mobility at baseline.    Physical therapy consulted    7. Diarrhea - Few loose stools today but not water , no fever   -- monitor for now     VTE Prophylaxis: Enoxaparin (Lovenox) SQ  Code Status: Full Code  Diet: Regular Diet Adult    Reid Catheter: Not present    Family updated today: his mother Ashlie was update.     Disposition: Pending TCU placement        Interval History (Subjective):        Nursing notes reviewed; no acute events overnight. No new symptoms.                  Physical Exam:        Last Vital Signs:  Temp: 97.6  F (36.4  C) Temp src: Oral BP: 132/78 Pulse: 81   Resp: 24 SpO2: 98 % O2 Device: Nasal cannula Oxygen Delivery: 6 LPM (per pt request) Height: 172.7 cm (5' 8\") Weight: 147.8 kg (325 lb 12.8 oz)  Estimated body mass index is 49.54 kg/m  as calculated from the following:    Height as of this encounter: 1.727 m (5' 8\").    Weight as of this encounter: 147.8 kg (325 lb 12.8 oz).    General: Very pleasant male resting comfortably in bedside chair without pants/briefs.  Awake, alert, interactive.  HEENT: Normocephalic, atraumatic.  PERRL, EOMI.  Conjunctiva clear, sclerae anicteric.  Mucous membranes moist.  Cardiac: Regular rate and rhythm without murmur, gallop, or rub.  No peripheral edema.  Respiratory: On supplemental O2. Normal work of breathing. Lungs clear.  GI: Normal, active bowel sounds.  Abdomen soft, nontender, nondistended.  : Deferred.  Musculoskeletal: Moving all extremities appropriately.  Skin: No rashes or abrasions on exposed skin.  Neurologic: Alert and oriented x4.  Cranial " external ointment Apply sparingly to affected area three times daily for 14 days. (Patient taking differently: Apply topically as needed Apply sparingly to affected area three times daily for 14 days.) 30 g 0    vitamin E (TOCOPHEROL) 1000 units (450 mg) CAPS capsule Take 1,000 Units by mouth daily         ALLERGIES     Allergies   Allergen Reactions    Lisinopril Cough    Penicillins Nausea       PAST MEDICAL HISTORY:  Past Medical History:   Diagnosis Date    Acid reflux disease     Benign essential hypertension     CAD (coronary artery disease)     Pure hypercholesterolemia        PAST SURGICAL HISTORY:  Past Surgical History:   Procedure Laterality Date    BREAST BIOPSY, RT/LT Right 01/01/1985    benign fibroadenoma    CATARACT EXTRACTION, BILATERAL Bilateral 2023    CV CORONARY ANGIOGRAM N/A 03/10/2020    Procedure: Coronary Angiogram;  Surgeon: Daniel Huffman MD;  Location:  HEART CARDIAC CATH LAB    CV FRACTIONAL FLOW RATIO WIRE N/A 03/10/2020    Procedure: Fractional Flow Reserve;  Surgeon: Daniel Huffman MD;  Location:  HEART CARDIAC CATH LAB    CV INSTANTANEOUS WAVE-FREE RATIO N/A 03/10/2020    Procedure: Instantaneous Wave-Free Ratio;  Surgeon: Daniel Huffman MD;  Location:  HEART CARDIAC CATH LAB    CV LEFT HEART CATH N/A 03/10/2020    Procedure: Left Heart Cath;  Surgeon: Daniel Huffman MD;  Location:  HEART CARDIAC CATH LAB    CV LEFT VENTRICULOGRAM N/A 03/10/2020    Procedure: Left Ventriculogram;  Surgeon: Daniel Huffman MD;  Location:  HEART CARDIAC CATH LAB    ESOPHAGOSCOPY, GASTROSCOPY, DUODENOSCOPY (EGD), COMBINED N/A 12/21/2017    Procedure: COMBINED ESOPHAGOSCOPY, GASTROSCOPY, DUODENOSCOPY (EGD), BIOPSY SINGLE OR MULTIPLE;  gastroscopy;  Surgeon: Srinivasa Jackson MD;  Location:  GI    TUBAL LIGATION  01/01/1990       FAMILY HISTORY:  Family History   Problem Relation Age of Onset    Heart Failure Mother     Myocardial Infarction Father         later in  life; s/p PCI    Prostate Cancer Father     Diabetes Type 2  Father     Skin Cancer Father     Melanoma Sister     Uterine Cancer Paternal Aunt     Cerebrovascular Disease No family hx of     Coronary Artery Disease Early Onset No family hx of     Ovarian Cancer No family hx of     Colon Cancer No family hx of        SOCIAL HISTORY:  Social History     Socioeconomic History    Marital status:      Spouse name: None    Number of children: None    Years of education: None    Highest education level: None   Occupational History    Occupation: Retired from [employer]   Tobacco Use    Smoking status: Never    Smokeless tobacco: Never   Vaping Use    Vaping status: Never Used   Substance and Sexual Activity    Alcohol use: Yes     Comment: occasional    Drug use: No    Sexual activity: Not Currently   Other Topics Concern    Parent/sibling w/ CABG, MI or angioplasty before 65F 55M? No   Social History Narrative    .    No kids.    Walking 4-5x/week.     Social Determinants of Health     Financial Resource Strain: Low Risk  (12/6/2023)    Financial Resource Strain     Within the past 12 months, have you or your family members you live with been unable to get utilities (heat, electricity) when it was really needed?: No   Food Insecurity: Low Risk  (12/6/2023)    Food Insecurity     Within the past 12 months, did you worry that your food would run out before you got money to buy more?: No     Within the past 12 months, did the food you bought just not last and you didn t have money to get more?: No   Transportation Needs: Low Risk  (12/6/2023)    Transportation Needs     Within the past 12 months, has lack of transportation kept you from medical appointments, getting your medicines, non-medical meetings or appointments, work, or from getting things that you need?: No   Interpersonal Safety: Low Risk  (12/6/2023)    Interpersonal Safety     Do you feel physically and emotionally safe where you currently live?: Yes     nerves II through XII grossly intact.  Psychologic: Appropriate mood and affect.           Medications:        All current medications were reviewed with changes reflected in problem list.         Data:      All new lab and imaging data was reviewed.      Data reviewed today: I reviewed all new labs and imaging results over the last 24 hours. I personally reviewed       Recent Labs   Lab 05/15/23  0717 05/14/23  0517 05/12/23  0114   WBC  --  6.1 6.3   HGB  --  10.5* 11.7*   HCT  --  34.8* 37.8*   MCV  --  101* 100    166 158     No results for input(s): CULT in the last 168 hours.  Recent Labs   Lab 05/15/23  0717 05/14/23  0836 05/14/23  0517 05/13/23  0806 05/13/23  0516 05/12/23  0513 05/12/23  0114     --  143  --  144  --  142   POTASSIUM 4.2  --  4.0  --  4.3  --  4.2   CHLORIDE 101  --  102  --  101  --  100   CO2 37*  --  36*  --  40*  --  39*   ANIONGAP 4*  --  5*  --  3*  --  3*   * 116* 114*   < > 88   < > 132*   BUN 13.2  --  9.6  --  11.0  --  11.3   CR 0.65*  --  0.64*  --  0.60*   < > 0.59*   GFRESTIMATED >90  --  >90  --  >90   < > >90   ARNOLD 8.6  --  8.7  --  8.6  --  8.6   PROTTOTAL  --   --   --   --   --   --  6.1*   ALBUMIN  --   --   --   --   --   --  4.0   BILITOTAL  --   --   --   --   --   --  0.3   ALKPHOS  --   --   --   --   --   --  103   AST  --   --   --   --   --   --  105*   ALT  --   --   --   --   --   --  117*    < > = values in this interval not displayed.       Recent Labs   Lab 05/15/23  0717 05/14/23  0836 05/14/23  0517 05/13/23  1616 05/13/23  1202   * 116* 114* 129* 104*       No results for input(s): INR in the last 168 hours.      No results for input(s): TROPONIN, TROPI, TROPR in the last 168 hours.    Invalid input(s): TROP, TROPONINIES    Recent Results (from the past 48 hour(s))   Head CT w/o contrast    Narrative    EXAM: CT HEAD W/O CONTRAST  LOCATION: [facility]  DATE/TIME: 5/12/2023 4:26 AM CDT    INDICATION:  " Within the past 12 months, have you been hit, slapped, kicked or otherwise physically hurt by someone?: No     Within the past 12 months, have you been humiliated or emotionally abused in other ways by your partner or ex-partner?: No   Housing Stability: Low Risk  (12/6/2023)    Housing Stability     Do you have housing? : Yes     Are you worried about losing your housing?: No       Review of Systems:  Skin:          Eyes:         ENT:         Respiratory:          Cardiovascular:         Gastroenterology:        Genitourinary:         Musculoskeletal:         Neurologic:         Psychiatric:         Heme/Lymph/Imm:         Endocrine:           Physical Exam:  Vitals: /61   Pulse 67   Ht 1.651 m (5' 5\")   Wt 69.7 kg (153 lb 11.2 oz)   LMP  (LMP Unknown)   SpO2 97%   BMI 25.58 kg/m      Constitutional:           Skin:             Head:           Eyes:           Lymph:      ENT:           Neck:           Respiratory:            Cardiac:                                                           GI:           Extremities and Muscular Skeletal:                 Neurological:           Psych:           CC  Ranulfo Martin MD  [address] [address]        Thank you for allowing me to participate in the care of your patient.      Sincerely,     Ranulfo Martin MD, MD     [facility]  " Traumatic injury. Pain.  COMPARISON: 10/25/2022, 04/05/2012  TECHNIQUE: Routine CT Head without IV contrast. Multiplanar reformats. Dose reduction techniques were used.    FINDINGS:  INTRACRANIAL CONTENTS: No intracranial hemorrhage, extraaxial collection, or mass effect.  No CT evidence of acute infarct. Mild to moderate presumed chronic small vessel ischemic changes. Extensive white matter volume loss. Stable ventriculomegaly.     VISUALIZED ORBITS/SINUSES/MASTOIDS: No intraorbital abnormality. No paranasal sinus mucosal disease. No middle ear or mastoid effusion.    BONES/SOFT TISSUES: No acute abnormality.      Impression    IMPRESSION:  1.  No acute intracranial process.  2.  No change from prior.     Cervical spine CT w/o contrast    Narrative    EXAM: CT CERVICAL SPINE W/O CONTRAST  LOCATION: [facility]  DATE/TIME: 5/12/2023 4:31 AM CDT    INDICATION: fall; fall; Neck pain; Trauma; Mild moderate trauma; None of the following: Spondyloarthropathy, cervical x ray with negative result, questionable finding, or inadequate coverage  COMPARISON: None.  TECHNIQUE: Routine CT Cervical Spine without IV contrast. Multiplanar reformats. Dose reduction techniques were used.    FINDINGS:  VERTEBRA: Reversal of usual cervical lordosis. Vertebral body heights and alignment is preserved. Fusion of the C4-C5 vertebra. Normal prevertebral soft tissues. No fracture or posttraumatic subluxation.     CANAL/FORAMINA: Relatively severe central canal stenosis at C4-C5, C5-C6 and C6-C7 with moderate to severe multilevel neural foraminal narrowing.    PARASPINAL: No extraspinal abnormality.      Impression    IMPRESSION:  1.  No fracture or posttraumatic subluxation.  2.  Reversal of the usual cervical lordosis.  3.  Relatively severe central canal stenosis at C4-C5, C5-C6 and C6-C7 with moderate to severe multilevel neural foraminal narrowing.     CT Chest (PE) Abdomen Pelvis w Contrast    Narrative    EXAM: CT  CHEST PE ABDOMEN PELVIS W CONTRAST  LOCATION: [facility]  DATE/TIME: 5/12/2023 4:32 AM CDT    INDICATION: Shortness of breath. Syncope.  COMPARISON:   1. Chest x-ray 2 views 05/04/2023.  2. Ultrasound abdomen limited 05/07/2023.  TECHNIQUE: CT chest pulmonary angiogram and routine CT abdomen pelvis with IV contrast. Arterial phase through the chest and venous phase through the abdomen and pelvis. Multiplanar reformats and MIP reconstructions were performed. Dose reduction   techniques were used.   CONTRAST: 77 mL Isovue 370.    FINDINGS:  ANGIOGRAM CHEST: Motion artifact degrades several images. Suboptimal opacification of the distal pulmonary vessels. No obvious pulmonary emboli on either side. Normal caliber thoracic aorta without aneurysm or dissection. No CT evidence of right heart   strain.     LUNGS AND PLEURA: Numerous images are degraded due to motion artifact. Mild diffuse thickening of the bronchi. Dependent atelectasis in the posterior lungs. No pleural effusion on either side.    MEDIASTINUM/AXILLAE: Normal cardiac size. No pericardial effusion. No suspicious adenopathy. Trachea is midline.    CORONARY ARTERY CALCIFICATION: None.    HEPATOBILIARY: Gallstones without biliary dilatation or adjacent inflammation. Diffuse fatty infiltration of the liver. Patent hepatic and portal veins.    PANCREAS: Normal.    SPLEEN: Normal.    ADRENAL GLANDS: Normal.    KIDNEYS/BLADDER: Milk of calcium cyst upper pole of the left kidney posteriorly containing dependent mineralization measures 3.5 x 3.5 cm (image 93, series 18), no specific follow-up required. Tiny punctate stones in the collecting system of both   kidneys, one on the right (image 47, series 17), and 3 on the left (images 40, 42 and 46, series 17). Both kidneys are well perfused without hydronephrosis or hydroureter. Normal urinary bladder.    BOWEL: Scattered colonic diverticulosis, without acute inflammation. Formed stool material  within normal caliber colon. Normal appendix. No obstruction, free gas or free fluid.    LYMPH NODES: No suspicious abdominopelvic adenopathy. Subcentimeter retroperitoneal and bilateral pelvic nodes, likely reactive.    VASCULATURE: Slightly atherosclerotic normal caliber distal abdominal aorta measuring 1.9 x 1.9 cm (image 57, series 17). Normal caliber IVC.    PELVIC ORGANS: Stool-filled rectosigmoid. Normal appendix. No free fluid. No suspicious adenopathy.    MUSCULOSKELETAL: Degenerative changes both shoulders, spine and joints of the pelvis. Slight right convex thoracic curve.      Impression    IMPRESSION:  1.  Several images are degraded due to motion artifact. Suboptimal opacification of the pulmonary vasculature. No obvious pulmonary emboli on either side. Mild diffuse thickening of the bronchi. Dependent atelectasis in the posterior lungs. No adenopathy   or effusion.    2.  Normal caliber thoracic aorta without aneurysm or dissection. Normal cardiac size. No pericardial effusion.    3.  Cholelithiasis. Fatty liver. Milk of calcium cyst upper pole of the left kidney posteriorly, no specific follow-up required. Nonobstructive stones in the renal collecting system, 1 on the right and 3 on the left.    4.  Scattered colonic diverticulosis without acute inflammation. No mechanical bowel obstruction, free gas or free fluid. Normal appendix.                 COVID Status:  COVID-19 PCR Results        8/30/2021    20:40 12/8/2021    02:51 6/20/2022    11:20 3/15/2023    13:14 3/25/2023    08:33   COVID-19 PCR Results   SARS CoV2 PCR Negative   Negative   Negative   Negative   Negative             5/12/2023    01:20   COVID-19 PCR Results   SARS CoV2 PCR Negative     COVID-19 Antibody Results, Testing for Immunity         No data to display

## 2024-04-23 NOTE — PROGRESS NOTES
HPI and Plan:   Is my pleasure to see your patient Debby Chan she is a pleasant 73-year-old patient with moderate nonflow-limiting coronary artery disease who also is a history of hyperlipidemia.  If remember when I saw this patient in March she was complaining of tachycardia.  We had the patient wear a event monitor for 21 days.  This showed that the patient did not have reproducible arrhythmias when she was complaining of tachycardia, chest discomfort or dizziness.  The vast majority of the time when the patient was complaining of a racing heart the patient's rhythm and rate was well within the normal range and the patient was not tachycardic.  Patient had occasional PVCs and PACs many of which were asymptomatic.  So we can clearly say that there are not reproducible arrhythmias or tachycardia when the patient is complaining of her symptoms.  She was well reassured by this.  The patient does complain of some symptoms of stress which certainly could give the impression of tachycardia or chest pressure.  Her lipids were excellent with the exception of borderline hypertriglyceridemia.  Her blood pressure as you can see below is also very well-controlled.    Impression:  1.  Nonreproducible arrhythmias when she has symptoms of tachycardia, chest pain and dizziness.  2.  Atypical chest discomfort which virtually always occurs at rest and is not associated with exertion and the patient does walk 4 miles a day typically without issue   3.  With the exception of borderline triglycerides the lipids are excellent with no evidence of hepatic toxicity.  4.  Normotensive.    Plan:  1.  We will continue the patient on her present medications.  2.  I have advised the patient to try some  destressing strategies.  This should help her symptoms I would feel  3.  I will have the patient return to see me with lipid profile and basic metabolic profile but as always has been told to contact me if she has any questions or any  concerns.    Ranulfo Martin MD, FACC, FRCP I    Orders Placed This Encounter   Procedures    Basic metabolic panel    Lipid Profile    ALT    Follow-Up with Cardiology       No orders of the defined types were placed in this encounter.      There are no discontinued medications.      Encounter Diagnoses   Name Primary?    Palpitations     Dizziness     Coronary artery disease involving native coronary artery of native heart without angina pectoris Yes    Hyperlipidemia LDL goal <70     Essential hypertension        CURRENT MEDICATIONS:  Current Outpatient Medications   Medication Sig Dispense Refill    Ascorbic Acid (VITAMIN C) 500 MG CAPS Take by mouth daily OTC      aspirin 81 MG EC tablet Take 1 tablet (81 mg) by mouth daily      atorvastatin (LIPITOR) 40 MG tablet Take 1 tablet (40 mg) by mouth daily 90 tablet 3    Calcium Carbonate-Vitamin D (CALCIUM + D PO) Take  by mouth. Plus magnesium      Cholecalciferol (VITAMIN D) 2000 UNITS tablet Take 1 tablet by mouth daily.      desonide (DESOWEN) 0.05 % external cream Apply topically 2 times daily as needed (vulvar irrittation) DO NOT USE FOR MORE THAN 14 DAYS IN A ROW 15 g 1    irbesartan (AVAPRO) 150 MG tablet Take 1 tablet (150 mg) by mouth at bedtime 90 tablet 3    metoprolol succinate ER (TOPROL XL) 25 MG 24 hr tablet Take 1 tablet (25 mg) by mouth daily 90 tablet 3    Multiple Vitamins-Minerals (WOMENS MULTIVITAMIN PO) Take by mouth daily OTC      omeprazole (PRILOSEC) 40 MG DR capsule TAKE 1 CAPSULE DAILY 30-60 MINUTES BEFORE A MEAL. 90 capsule 2    polyethylene glycol (MIRALAX) 17 GM/Dose powder Take 17 g (1 Capful) by mouth daily (Patient taking differently: Take 1 Capful by mouth as needed) 850 g 11    triamcinolone (KENALOG) 0.1 % external ointment Apply sparingly to affected area three times daily for 14 days. (Patient taking differently: Apply topically as needed Apply sparingly to affected area three times daily for 14 days.) 30 g 0    vitamin E  (TOCOPHEROL) 1000 units (450 mg) CAPS capsule Take 1,000 Units by mouth daily         ALLERGIES     Allergies   Allergen Reactions    Lisinopril Cough    Penicillins Nausea       PAST MEDICAL HISTORY:  Past Medical History:   Diagnosis Date    Acid reflux disease     Benign essential hypertension     CAD (coronary artery disease)     Pure hypercholesterolemia        PAST SURGICAL HISTORY:  Past Surgical History:   Procedure Laterality Date    BREAST BIOPSY, RT/LT Right 01/01/1985    benign fibroadenoma    CATARACT EXTRACTION, BILATERAL Bilateral 2023    CV CORONARY ANGIOGRAM N/A 03/10/2020    Procedure: Coronary Angiogram;  Surgeon: Daniel Huffman MD;  Location:  [facility]    CV FRACTIONAL FLOW RATIO WIRE N/A 03/10/2020    Procedure: Fractional Flow Reserve;  Surgeon: Daniel Huffman MD;  Location:  [facility]    CV INSTANTANEOUS WAVE-FREE RATIO N/A 03/10/2020    Procedure: Instantaneous Wave-Free Ratio;  Surgeon: Daniel Huffman MD;  Location:  [facility]    CV LEFT HEART CATH N/A 03/10/2020    Procedure: Left Heart Cath;  Surgeon: Daniel Huffman MD;  Location:  [facility]    CV LEFT VENTRICULOGRAM N/A 03/10/2020    Procedure: Left Ventriculogram;  Surgeon: Daniel Huffman MD;  Location:  [facility]    ESOPHAGOSCOPY, GASTROSCOPY, DUODENOSCOPY (EGD), COMBINED N/A 12/21/2017    Procedure: COMBINED ESOPHAGOSCOPY, GASTROSCOPY, DUODENOSCOPY (EGD), BIOPSY SINGLE OR MULTIPLE;  gastroscopy;  Surgeon: Srinivasa Jackson MD;  Location:  GI    TUBAL LIGATION  01/01/1990       FAMILY HISTORY:  Family History   Problem Relation Age of Onset    Heart Failure Mother     Myocardial Infarction Father         later in life; s/p PCI    Prostate Cancer Father     Diabetes Type 2  Father     Skin Cancer Father     Melanoma Sister     Uterine Cancer Paternal Aunt     Cerebrovascular Disease No family hx of     Coronary Artery Disease Early  Onset No family hx of     Ovarian Cancer No family hx of     Colon Cancer No family hx of        SOCIAL HISTORY:  Social History     Socioeconomic History    Marital status:      Spouse name: None    Number of children: None    Years of education: None    Highest education level: None   Occupational History    Occupation: Retired from [employer]   Tobacco Use    Smoking status: Never    Smokeless tobacco: Never   Vaping Use    Vaping status: Never Used   Substance and Sexual Activity    Alcohol use: Yes     Comment: occasional    Drug use: No    Sexual activity: Not Currently   Other Topics Concern    Parent/sibling w/ CABG, MI or angioplasty before 65F 55M? No   Social History Narrative    .    No kids.    Walking 4-5x/week.     Social Determinants of Health     Financial Resource Strain: Low Risk  (12/6/2023)    Financial Resource Strain     Within the past 12 months, have you or your family members you live with been unable to get utilities (heat, electricity) when it was really needed?: No   Food Insecurity: Low Risk  (12/6/2023)    Food Insecurity     Within the past 12 months, did you worry that your food would run out before you got money to buy more?: No     Within the past 12 months, did the food you bought just not last and you didn t have money to get more?: No   Transportation Needs: Low Risk  (12/6/2023)    Transportation Needs     Within the past 12 months, has lack of transportation kept you from medical appointments, getting your medicines, non-medical meetings or appointments, work, or from getting things that you need?: No   Interpersonal Safety: Low Risk  (12/6/2023)    Interpersonal Safety     Do you feel physically and emotionally safe where you currently live?: Yes     Within the past 12 months, have you been hit, slapped, kicked or otherwise physically hurt by someone?: No     Within the past 12 months, have you been humiliated or emotionally abused in other ways by your partner or  "ex-partner?: No   Housing Stability: Low Risk  (12/6/2023)    Housing Stability     Do you have housing? : Yes     Are you worried about losing your housing?: No       Review of Systems:  Skin:          Eyes:         ENT:         Respiratory:          Cardiovascular:         Gastroenterology:        Genitourinary:         Musculoskeletal:         Neurologic:         Psychiatric:         Heme/Lymph/Imm:         Endocrine:           Physical Exam:  Vitals: /61   Pulse 67   Ht 1.651 m (5' 5\")   Wt 69.7 kg (153 lb 11.2 oz)   LMP  (LMP Unknown)   SpO2 97%   BMI 25.58 kg/m      Constitutional:           Skin:             Head:           Eyes:           Lymph:      ENT:           Neck:           Respiratory:            Cardiac:                                                           GI:           Extremities and Muscular Skeletal:                 Neurological:           Psych:           CC  Ranulof Martin MD  [id number]  [place] [id number]                "

## 2024-08-09 ENCOUNTER — MYC MEDICAL ADVICE (OUTPATIENT)
Dept: INTERNAL MEDICINE | Facility: CLINIC | Age: 74
End: 2024-08-09
Payer: MEDICARE

## 2024-09-01 ENCOUNTER — APPOINTMENT (OUTPATIENT)
Dept: GENERAL RADIOLOGY | Facility: CLINIC | Age: 74
End: 2024-09-01
Attending: EMERGENCY MEDICINE
Payer: MEDICARE

## 2024-09-01 ENCOUNTER — APPOINTMENT (OUTPATIENT)
Dept: ULTRASOUND IMAGING | Facility: CLINIC | Age: 74
End: 2024-09-01
Attending: EMERGENCY MEDICINE
Payer: MEDICARE

## 2024-09-01 ENCOUNTER — HOSPITAL ENCOUNTER (EMERGENCY)
Facility: CLINIC | Age: 74
Discharge: HOME | End: 2024-09-01
Attending: EMERGENCY MEDICINE | Admitting: EMERGENCY MEDICINE
Payer: MEDICARE

## 2024-09-01 VITALS
WEIGHT: 160 LBS | OXYGEN SATURATION: 97 % | SYSTOLIC BLOOD PRESSURE: 145 MMHG | BODY MASS INDEX: 26.66 KG/M2 | RESPIRATION RATE: 21 BRPM | HEART RATE: 68 BPM | HEIGHT: 65 IN | DIASTOLIC BLOOD PRESSURE: 86 MMHG | TEMPERATURE: 97.4 F

## 2024-09-01 DIAGNOSIS — K76.0 HEPATIC STEATOSIS: ICD-10-CM

## 2024-09-01 DIAGNOSIS — R10.9 ABDOMINAL PAIN, UNSPECIFIED ABDOMINAL LOCATION: ICD-10-CM

## 2024-09-01 DIAGNOSIS — R07.9 CHEST PAIN, UNSPECIFIED TYPE: ICD-10-CM

## 2024-09-01 LAB
ALBUMIN SERPL BCG-MCNC: 4.2 G/DL (ref 3.5–5.2)
ALP SERPL-CCNC: 77 U/L (ref 40–150)
ALT SERPL W P-5'-P-CCNC: 24 U/L (ref 0–50)
ANION GAP SERPL CALCULATED.3IONS-SCNC: 13 MMOL/L (ref 7–15)
AST SERPL W P-5'-P-CCNC: 24 U/L (ref 0–45)
ATRIAL RATE - MUSE: 68 BPM
BASOPHILS # BLD AUTO: 0 10E3/UL (ref 0–0.2)
BASOPHILS NFR BLD AUTO: 1 %
BILIRUB DIRECT SERPL-MCNC: <0.2 MG/DL (ref 0–0.3)
BILIRUB SERPL-MCNC: 0.4 MG/DL
BUN SERPL-MCNC: 14.5 MG/DL (ref 8–23)
CALCIUM SERPL-MCNC: 9.4 MG/DL (ref 8.8–10.4)
CHLORIDE SERPL-SCNC: 107 MMOL/L (ref 98–107)
CREAT SERPL-MCNC: 0.88 MG/DL (ref 0.51–0.95)
DIASTOLIC BLOOD PRESSURE - MUSE: NORMAL MMHG
EGFRCR SERPLBLD CKD-EPI 2021: 69 ML/MIN/1.73M2
EOSINOPHIL # BLD AUTO: 0.2 10E3/UL (ref 0–0.7)
EOSINOPHIL NFR BLD AUTO: 3 %
ERYTHROCYTE [DISTWIDTH] IN BLOOD BY AUTOMATED COUNT: 13.4 % (ref 10–15)
GLUCOSE SERPL-MCNC: 125 MG/DL (ref 70–99)
HCO3 SERPL-SCNC: 21 MMOL/L (ref 22–29)
HCT VFR BLD AUTO: 43.6 % (ref 35–47)
HGB BLD-MCNC: 14.5 G/DL (ref 11.7–15.7)
IMM GRANULOCYTES # BLD: 0 10E3/UL
IMM GRANULOCYTES NFR BLD: 1 %
INTERPRETATION ECG - MUSE: NORMAL
LIPASE SERPL-CCNC: 42 U/L (ref 13–60)
LYMPHOCYTES # BLD AUTO: 1.8 10E3/UL (ref 0.8–5.3)
LYMPHOCYTES NFR BLD AUTO: 28 %
MAGNESIUM SERPL-MCNC: 2 MG/DL (ref 1.7–2.3)
MCH RBC QN AUTO: 31 PG (ref 26.5–33)
MCHC RBC AUTO-ENTMCNC: 33.3 G/DL (ref 31.5–36.5)
MCV RBC AUTO: 93 FL (ref 78–100)
MONOCYTES # BLD AUTO: 0.6 10E3/UL (ref 0–1.3)
MONOCYTES NFR BLD AUTO: 9 %
NEUTROPHILS # BLD AUTO: 4 10E3/UL (ref 1.6–8.3)
NEUTROPHILS NFR BLD AUTO: 60 %
NRBC # BLD AUTO: 0 10E3/UL
NRBC BLD AUTO-RTO: 0 /100
NT-PROBNP SERPL-MCNC: 113 PG/ML (ref 0–900)
P AXIS - MUSE: 42 DEGREES
PLATELET # BLD AUTO: 219 10E3/UL (ref 150–450)
POTASSIUM SERPL-SCNC: 4 MMOL/L (ref 3.4–5.3)
PR INTERVAL - MUSE: 166 MS
PROT SERPL-MCNC: 6.6 G/DL (ref 6.4–8.3)
QRS DURATION - MUSE: 78 MS
QT - MUSE: 398 MS
QTC - MUSE: 423 MS
R AXIS - MUSE: 43 DEGREES
RBC # BLD AUTO: 4.68 10E6/UL (ref 3.8–5.2)
SODIUM SERPL-SCNC: 141 MMOL/L (ref 135–145)
SYSTOLIC BLOOD PRESSURE - MUSE: NORMAL MMHG
T AXIS - MUSE: 37 DEGREES
TROPONIN T SERPL HS-MCNC: <6 NG/L
VENTRICULAR RATE- MUSE: 68 BPM
WBC # BLD AUTO: 6.6 10E3/UL (ref 4–11)

## 2024-09-01 PROCEDURE — 83735 ASSAY OF MAGNESIUM: CPT | Performed by: EMERGENCY MEDICINE

## 2024-09-01 PROCEDURE — 250N000013 HC RX MED GY IP 250 OP 250 PS 637: Performed by: EMERGENCY MEDICINE

## 2024-09-01 PROCEDURE — 93005 ELECTROCARDIOGRAM TRACING: CPT

## 2024-09-01 PROCEDURE — 83690 ASSAY OF LIPASE: CPT | Performed by: EMERGENCY MEDICINE

## 2024-09-01 PROCEDURE — 99285 EMERGENCY DEPT VISIT HI MDM: CPT | Mod: 25

## 2024-09-01 PROCEDURE — 76705 ECHO EXAM OF ABDOMEN: CPT

## 2024-09-01 PROCEDURE — 85025 COMPLETE CBC W/AUTO DIFF WBC: CPT | Performed by: EMERGENCY MEDICINE

## 2024-09-01 PROCEDURE — 36415 COLL VENOUS BLD VENIPUNCTURE: CPT | Performed by: EMERGENCY MEDICINE

## 2024-09-01 PROCEDURE — 84484 ASSAY OF TROPONIN QUANT: CPT | Performed by: EMERGENCY MEDICINE

## 2024-09-01 PROCEDURE — 83880 ASSAY OF NATRIURETIC PEPTIDE: CPT | Performed by: EMERGENCY MEDICINE

## 2024-09-01 PROCEDURE — 80048 BASIC METABOLIC PNL TOTAL CA: CPT | Performed by: EMERGENCY MEDICINE

## 2024-09-01 PROCEDURE — 71046 X-RAY EXAM CHEST 2 VIEWS: CPT

## 2024-09-01 PROCEDURE — 80053 COMPREHEN METABOLIC PANEL: CPT | Performed by: EMERGENCY MEDICINE

## 2024-09-01 PROCEDURE — 82040 ASSAY OF SERUM ALBUMIN: CPT | Performed by: EMERGENCY MEDICINE

## 2024-09-01 RX ORDER — MAGNESIUM HYDROXIDE/ALUMINUM HYDROXICE/SIMETHICONE 120; 1200; 1200 MG/30ML; MG/30ML; MG/30ML
15 SUSPENSION ORAL ONCE
Status: COMPLETED | OUTPATIENT
Start: 2024-09-01 | End: 2024-09-01

## 2024-09-01 RX ADMIN — ALUMINUM HYDROXIDE, MAGNESIUM HYDROXIDE, AND SIMETHICONE 15 ML: 200; 200; 20 SUSPENSION ORAL at 06:53

## 2024-09-01 ASSESSMENT — COLUMBIA-SUICIDE SEVERITY RATING SCALE - C-SSRS
2. HAVE YOU ACTUALLY HAD ANY THOUGHTS OF KILLING YOURSELF IN THE PAST MONTH?: NO
1. IN THE PAST MONTH, HAVE YOU WISHED YOU WERE DEAD OR WISHED YOU COULD GO TO SLEEP AND NOT WAKE UP?: NO
6. HAVE YOU EVER DONE ANYTHING, STARTED TO DO ANYTHING, OR PREPARED TO DO ANYTHING TO END YOUR LIFE?: NO

## 2024-09-01 ASSESSMENT — ACTIVITIES OF DAILY LIVING (ADL)
ADLS_ACUITY_SCORE: 35
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
## 2024-09-01 NOTE — ED TRIAGE NOTES
Patient ststaes woke up with SOB & has backache.  Backache started yesterday afternoon.  Has some issues when going up inclines; has chest pain.  Has a cardiologist.

## 2024-09-01 NOTE — DISCHARGE INSTRUCTIONS
Continue your previously prescribed medications    Follow-up with your primary care doctor in 1 to 2 days    Follow up with your cardiologist    Return to the ER for any worsening or concerning symptoms

## 2024-09-02 ENCOUNTER — TELEPHONE (OUTPATIENT)
Dept: INTERNAL MEDICINE | Facility: CLINIC | Age: 74
End: 2024-09-02
Payer: MEDICARE

## 2024-09-02 NOTE — TELEPHONE ENCOUNTER
Reason for Call:  Appointment Request    Patient requesting this type of appt:  Hospital/ED Follow-Up     Requested provider: Ida Lyles    Reason patient unable to be scheduled: Not within requested timeframe    When does patient want to be seen/preferred time: 1-2 days    Comments: Follow-up ER 9/1    Could we send this information to you in [employer] or would you prefer to receive a phone call?:   Patient would prefer a phone call   Okay to leave a detailed message?: Yes at Cell number on file:    Telephone Information:   Mobile [phone]       Call taken on 9/2/2024 at 12:16 PM by Christy Christine PTA     Detail Level: Zone

## 2024-09-03 ENCOUNTER — OFFICE VISIT (OUTPATIENT)
Dept: INTERNAL MEDICINE | Facility: CLINIC | Age: 74
End: 2024-09-03
Payer: MEDICARE

## 2024-09-03 VITALS
HEART RATE: 92 BPM | SYSTOLIC BLOOD PRESSURE: 132 MMHG | OXYGEN SATURATION: 98 % | TEMPERATURE: 97.6 F | WEIGHT: 161.8 LBS | RESPIRATION RATE: 19 BRPM | BODY MASS INDEX: 26.92 KG/M2 | DIASTOLIC BLOOD PRESSURE: 66 MMHG

## 2024-09-03 DIAGNOSIS — R07.89 ATYPICAL CHEST PAIN: Primary | ICD-10-CM

## 2024-09-03 DIAGNOSIS — E66.3 OVERWEIGHT (BMI 25.0-29.9): ICD-10-CM

## 2024-09-03 DIAGNOSIS — K76.0 HEPATIC STEATOSIS: ICD-10-CM

## 2024-09-03 DIAGNOSIS — I10 BENIGN ESSENTIAL HYPERTENSION: ICD-10-CM

## 2024-09-03 DIAGNOSIS — K21.9 GASTROESOPHAGEAL REFLUX DISEASE WITHOUT ESOPHAGITIS: ICD-10-CM

## 2024-09-03 DIAGNOSIS — R06.02 SHORTNESS OF BREATH: ICD-10-CM

## 2024-09-03 PROCEDURE — G2211 COMPLEX E/M VISIT ADD ON: HCPCS | Performed by: INTERNAL MEDICINE

## 2024-09-03 PROCEDURE — 99214 OFFICE O/P EST MOD 30 MIN: CPT | Performed by: INTERNAL MEDICINE

## 2024-09-03 NOTE — PROGRESS NOTES
ASSESSMENT/PLAN                                                      (R07.89) Atypical chest pain  (primary encounter diagnosis)  (R06.02) Shortness of breath  (K21.9) Gastroesophageal reflux disease without esophagitis  Comment: etiology unclear; recent ER workup generally unremarkable.  Plan: TRIAL of splitting up medications between the morning and the evening will continue omeprazole 40 mg daily and consistently; if symptoms worsen, change, or do not improve, patient to contact MD.      (I10) Benign essential hypertension  Comment: well-controlled on irbesartan and Toprol XL.  Plan: Continue present management.    (K76.0) Hepatic steatosis  (E66.3) Overweight (BMI 25.0-29.9)  Plan: Adult Comprehensive Weight Management  Referral placed - patient to schedule; patient encouraged to lose weight via adherence to a heart healthy diet and regular exercise.    The longitudinal plan of care for the diagnosis(es)/condition(s) as documented were addressed during this visit. Due to the added complexity in care, I will continue to support Debby in the subsequent management and with ongoing continuity of care.      Ida Lyles MD   [address] [address]  T: [phone], F: [phone]    SUBJECTIVE                                                      Debby Chan is a very pleasant 74 year old female who presents for ER follow-up:    Patient was seen in the ER 9/1/2024 with chest pain and shortness of breath. Exam notable for elevated blood pressure. Evaluation otherwise, including labs, EKG, CXR, and RUQ US unremarkable/unrevealing.    Unfortunate, patient reports no significant improvement in her symptoms since her ER visit. Thankfully, symptoms are not worse or different. Blood pressure was elevated on rooming today, but normal on recheck.    Of note, patient reports that she takes all of her medications in the evening before bed.  While she is on  omeprazole 40 mg daily and consistently for acid reflux, it is possible that taking all of these medications before bed may be exacerbating her acid reflux.    Patient was a bit concerned about her RUQ US demonstrating hepatic steatosis. Discussed with patient that this is a common finding and is likely due to excess weight. No history of regular or excessive alcohol use.    OBJECTIVE                                                      /66   Pulse 92   Temp 97.6  F (36.4  C) (Temporal)   Resp 19   Wt 73.4 kg (161 lb 12.8 oz)   LMP  (LMP Unknown)   SpO2 98%   BMI 26.92 kg/m    Constitutional: well-appearing  Respiratory: normal respiratory effort; clear to auscultation bilaterally  Cardiovascular: regular rate and rhythm; no edema  Gastrointestinal: soft, non-tender, and non-distended; no organomegaly or masses  Musculoskeletal: normal gait and station  Psych: normal judgment and insight; normal mood and affect; recent and remote memory intact    ---    (Note documentation was completed, in part, with [email or web address] voice-recognition software. Documentation was reviewed, but some grammatical, spelling, and word errors may remain.)

## 2024-11-15 DIAGNOSIS — K21.9 GASTROESOPHAGEAL REFLUX DISEASE WITHOUT ESOPHAGITIS: ICD-10-CM

## 2024-11-15 RX ORDER — OMEPRAZOLE 40 MG/1
CAPSULE, DELAYED RELEASE ORAL
Qty: 90 CAPSULE | Refills: 2 | Status: SHIPPED | OUTPATIENT
Start: 2024-11-15

## 2024-12-06 SDOH — HEALTH STABILITY: PHYSICAL HEALTH: ON AVERAGE, HOW MANY DAYS PER WEEK DO YOU ENGAGE IN MODERATE TO STRENUOUS EXERCISE (LIKE A BRISK WALK)?: 5 DAYS

## 2024-12-06 SDOH — HEALTH STABILITY: PHYSICAL HEALTH: ON AVERAGE, HOW MANY MINUTES DO YOU ENGAGE IN EXERCISE AT THIS LEVEL?: 80 MIN

## 2024-12-06 ASSESSMENT — SOCIAL DETERMINANTS OF HEALTH (SDOH): HOW OFTEN DO YOU GET TOGETHER WITH FRIENDS OR RELATIVES?: ONCE A WEEK

## 2024-12-11 ENCOUNTER — OFFICE VISIT (OUTPATIENT)
Dept: INTERNAL MEDICINE | Facility: CLINIC | Age: 74
End: 2024-12-11
Payer: MEDICARE

## 2024-12-11 VITALS
WEIGHT: 162.5 LBS | BODY MASS INDEX: 27.07 KG/M2 | DIASTOLIC BLOOD PRESSURE: 60 MMHG | SYSTOLIC BLOOD PRESSURE: 106 MMHG | OXYGEN SATURATION: 99 % | HEART RATE: 63 BPM | TEMPERATURE: 97 F | HEIGHT: 65 IN

## 2024-12-11 DIAGNOSIS — I25.10 CORONARY ARTERY DISEASE INVOLVING NATIVE CORONARY ARTERY OF NATIVE HEART WITHOUT ANGINA PECTORIS: ICD-10-CM

## 2024-12-11 DIAGNOSIS — I10 BENIGN ESSENTIAL HYPERTENSION: ICD-10-CM

## 2024-12-11 DIAGNOSIS — K21.9 GASTROESOPHAGEAL REFLUX DISEASE WITHOUT ESOPHAGITIS: ICD-10-CM

## 2024-12-11 DIAGNOSIS — E78.00 PURE HYPERCHOLESTEROLEMIA: ICD-10-CM

## 2024-12-11 DIAGNOSIS — Z00.00 MEDICARE ANNUAL WELLNESS VISIT, SUBSEQUENT: Primary | ICD-10-CM

## 2024-12-11 PROCEDURE — G0439 PPPS, SUBSEQ VISIT: HCPCS | Performed by: INTERNAL MEDICINE

## 2024-12-11 PROCEDURE — 99214 OFFICE O/P EST MOD 30 MIN: CPT | Mod: 25 | Performed by: INTERNAL MEDICINE

## 2024-12-11 RX ORDER — IRBESARTAN 150 MG/1
150 TABLET ORAL AT BEDTIME
Qty: 90 TABLET | Refills: 0 | Status: SHIPPED | OUTPATIENT
Start: 2024-12-11

## 2024-12-11 NOTE — PATIENT INSTRUCTIONS
Re: leg cramps, there are LOTS of things to try:    Bar of soap (any soap) in bed at night. No need to be touching it    2. Tonic water before bed (mix with a flavored syrup if you don't like the taste of plain tonic water)    3. Acacia's Leg Cramp Formula (available over the counter)    4. Fei Stop the Leg Cramps Formula ([employer] and some health food/natural food/supplement stores)    5. Daily magnesium, potassium, and/or calcium supplements (available over the counter)    6. Rubbing CBD oil on legs before bed    7. Quinine tablets/capsules before bed (prescription)

## 2024-12-11 NOTE — PROGRESS NOTES
ASSESSMENT/PLAN                                                       (Z00.00) Medicare annual wellness visit, subsequent  (primary encounter diagnosis)  Comment: PMH, PSH, FH, SH, medications, allergies, immunizations, and preventative health measures reviewed and updated as appropriate.  Plan: see below for plans.      (I10) Benign essential hypertension  Comment: well-controlled on irbesartan and Toprol XL.   Plan: continue present management; refills provided.     (E78.00) Pure hypercholesterolemia  Comment: well-controlled on atorvastatin.     (K21.9) Gastroesophageal reflux disease without esophagitis  Comment: well-controlled on omeprazole.     (I25.10) Coronary artery disease involving native coronary artery of native heart without angina pectoris  Comment: on optimal medical Rx (aspirin, statin, and BB); followed by cardiology.     Appropriate preventive services were discussed with this patient, including applicable screening as appropriate for cardiovascular disease, diabetes, osteopenia/osteoporosis, and glaucoma.  As appropriate for age/gender, discussed screening for colorectal cancer, prostate cancer, breast cancer, and cervical cancer. Checklist reviewing preventive services available has been given to the patient.    Reviewed patients plan of care. The Basic Care Plan (routine screening as documented in Health Maintenance) for Debby Chan meets the Care Plan requirement. This Care Plan has been established and reviewed with the Patient.    Ida Lyles MD   [address] [address]  T: [phone], F: [phone]    SUBJECTIVE                                                      Debby Chan is a very pleasant 74 year old female who presents for her subsequent AWV:    Current providers (other than myself): Mario (cardiology)    PMH, PSH, FH, SH, medications, allergies, immunizations, preventative health, and health risk  assessment reviewed and updated as appropriate.    Past Medical History:   Diagnosis Date    Acid reflux disease     Benign essential hypertension     CAD (coronary artery disease)     Pure hypercholesterolemia      Past Surgical History:   Procedure Laterality Date    BREAST BIOPSY, RT/LT Right 01/01/1985    benign fibroadenoma    CATARACT EXTRACTION Right 2024    redo    CATARACT EXTRACTION, BILATERAL Bilateral 2023    CV CORONARY ANGIOGRAM N/A 03/10/2020    Procedure: Coronary Angiogram;  Surgeon: Dainel Huffman MD;  Location:  [facility]    CV FRACTIONAL FLOW RATIO WIRE N/A 03/10/2020    Procedure: Fractional Flow Reserve;  Surgeon: Daniel Huffman MD;  Location:  [facility]    CV INSTANTANEOUS WAVE-FREE RATIO N/A 03/10/2020    Procedure: Instantaneous Wave-Free Ratio;  Surgeon: Daniel Huffman MD;  Location:  [facility]    CV LEFT HEART CATH N/A 03/10/2020    Procedure: Left Heart Cath;  Surgeon: Daniel Huffman MD;  Location:  [facility]    CV LEFT VENTRICULOGRAM N/A 03/10/2020    Procedure: Left Ventriculogram;  Surgeon: Daniel Huffman MD;  Location:  [facility]    ESOPHAGOSCOPY, GASTROSCOPY, DUODENOSCOPY (EGD), COMBINED N/A 12/21/2017    Procedure: COMBINED ESOPHAGOSCOPY, GASTROSCOPY, DUODENOSCOPY (EGD), BIOPSY SINGLE OR MULTIPLE;  gastroscopy;  Surgeon: Srinivasa Jackson MD;  Location:  GI    TUBAL LIGATION  01/01/1990     Family History   Problem Relation Age of Onset    Heart Failure Mother     Myocardial Infarction Father         later in life; s/p PCI    Prostate Cancer Father     Diabetes Type 2  Father     Skin Cancer Father     Melanoma Sister     Uterine Cancer Paternal Aunt     Cerebrovascular Disease No family hx of     Coronary Artery Disease Early Onset No family hx of     Ovarian Cancer No family hx of     Colon Cancer No family hx of      Social History     Occupational History    Occupation:  Retired from [employer]   Tobacco Use    Smoking status: Never    Smokeless tobacco: Never   Vaping Use    Vaping status: Never Used   Substance and Sexual Activity    Alcohol use: Yes     Comment: occasional    Drug use: No    Sexual activity: Not Currently   Social History Narrative    .    No kids.    Walking 4-5x/week.     Allergies   Allergen Reactions    Lisinopril Cough    Penicillins Nausea     Current Outpatient Medications   Medication Sig    Ascorbic Acid (VITAMIN C) 500 MG CAPS Take by mouth daily OTC    aspirin 81 MG EC tablet Take 1 tablet (81 mg) by mouth daily    atorvastatin (LIPITOR) 40 MG tablet Take 1 tablet (40 mg) by mouth daily    Calcium Carbonate-Vitamin D (CALCIUM + D PO) Take  by mouth. Plus magnesium    Cholecalciferol (VITAMIN D) 2000 UNITS tablet Take 1 tablet by mouth daily.    desonide (DESOWEN) 0.05 % external cream Apply topically 2 times daily as needed (vulvar irrittation) DO NOT USE FOR MORE THAN 14 DAYS IN A ROW    irbesartan (AVAPRO) 150 MG tablet Take 1 tablet (150 mg) by mouth at bedtime.    metoprolol succinate ER (TOPROL XL) 25 MG 24 hr tablet Take 1 tablet (25 mg) by mouth daily    Multiple Vitamins-Minerals (WOMENS MULTIVITAMIN PO) Take by mouth daily OTC    omeprazole (PRILOSEC) 40 MG DR capsule TAKE 1 CAPSULE DAILY 30-60 MINUTES BEFORE A MEAL.    triamcinolone (KENALOG) 0.1 % external ointment Apply sparingly to affected area three times daily for 14 days. (Patient taking differently: Apply topically as needed. Apply sparingly to affected area three times daily for 14 days.)    vitamin E (TOCOPHEROL) 1000 units (450 mg) CAPS capsule Take 1,000 Units by mouth daily     Immunization History   Administered Date(s) Administered    COVID-19 12+ (MODERNA) 11/18/2023    COVID-19 12+ (Pfizer) 10/21/2024    COVID-19 Bivalent 12+ (Pfizer) 09/27/2022    COVID-19 Monovalent 18+ (Moderna) 03/01/2021, 03/29/2021, 10/27/2021, 04/25/2022    DTP-Hib 10/12/1993, 12/13/1993, 03/01/1994     Flu 65+ (Fluad) 10/15/2021, 10/21/2024    HIB (PRP-T) 11/08/1994    Hepatitis B, Peds 08/19/1993, 10/12/1993, 03/01/1994    Historical DTP/aP 02/14/1995, 08/27/1998    Influenza (High Dose) Trivalent,PF (Fluzone) 10/04/2016, 10/17/2017, 10/23/2018, 11/12/2019, 10/06/2020    Influenza (IIV3) PF 12/03/2010    Influenza Vaccine 65+ (FLUAD) 10/15/2021, 11/11/2022, 10/19/2023    Influenza Vaccine >6 months,quad, PF 10/06/2020    MMR 11/08/1994, 08/27/1998    OPV, trivalent, live 10/12/1993, 12/15/1993, 02/14/1995, 08/27/1998    Pneumo Conj 13-V (2010&after) 08/07/2015    Pneumococcal 23 valent 10/04/2016    TDAP Vaccine (Adacel) 05/31/2012    Varicella 10/11/1995    Zoster recombinant adjuvanted (SHINGRIX) 08/01/2023, 10/19/2023     PREVENTATIVE HEALTH                                                      BMI: within normal limits   Blood pressure: well-controlled on current regimen   Breast CA screening: up to date   Colon CA screening: up to date   Lung CA screening: n/a   Dexa: up to date   Screening cholesterol: n/a - already being treated for this condition  Screening diabetes: up to date   Alcohol misuse screening: alcohol use reviewed - no intervention indicated at this time  Immunizations: reviewed; RSV vaccination DUE - not covered by Medicare (may obtain in pharmacy if desired)    HEALTH RISK ASSESSMENT                                                      In general, how would you rate your overall physical health? good  Outside of work, how many days during the week do you exercise? 5 days/week  Outside of work, approximately how many minutes a day do you exercise? greater than 60 minutes    If you drink alcohol do you typically have >3 drinks per day or >7 drinks per week? No     Assistance with daily activities: No    Safety concerns: No    Fall risk assessment: completed today (see ambulatory assessments)    Hearing concerns: YES - needs to ask people to speak up or repeat themselves and trouble following  "dialogue in the theater    In the past 6 months, have you been bothered by leaking of urine: No    Do you have a current opioid prescription? No  Do you use any other controlled substances or medications that are not prescribed by a provider? None    PHQ-2/PHQ-9 assessment: completed today (see ambulatory assessments)    Additional concerns today: No    Have you ever done Advance Care Planning? (For example, a Health Directive, POLST, or a discussion with a medical provider or your loved ones about your wishes): Yes    OBJECTIVE                                                      /60   Pulse 63   Temp 97  F (36.1  C) (Temporal)   Ht 1.651 m (5' 5\")   Wt 73.7 kg (162 lb 8 oz)   LMP  (LMP Unknown)   SpO2 99%   BMI 27.04 kg/m    Constitutional: well-appearing  Head, Ears, and Eyes: normocephalic; normal external auditory canal and pinna; tympanic membranes visualized and normal; normal lids and conjunctivae  Neck: supple, symmetric, no thyromegaly or lymphadenopathy  Respiratory: normal respiratory effort; clear to auscultation bilaterally  Cardiovascular: regular rate and rhythm; no edema  Gastrointestinal: soft, non-tender, and non-distended; no organomegaly or masses  Musculoskeletal: normal gait and station  Psych: normal judgment and insight; normal mood and affect; recent and remote memory intact    Cognitive impairment noted: No  ---  (Note was completed, in part, with [employer]ze voice-recognition software. Documentation was reviewed, but some grammatical, spelling, and word errors may remain.)  "

## 2025-01-23 ENCOUNTER — MYC REFILL (OUTPATIENT)
Dept: INTERNAL MEDICINE | Facility: CLINIC | Age: 75
End: 2025-01-23
Payer: MEDICARE

## 2025-01-23 DIAGNOSIS — E78.00 PURE HYPERCHOLESTEROLEMIA: ICD-10-CM

## 2025-01-23 DIAGNOSIS — K21.9 GASTROESOPHAGEAL REFLUX DISEASE WITHOUT ESOPHAGITIS: ICD-10-CM

## 2025-01-23 DIAGNOSIS — I10 BENIGN ESSENTIAL HYPERTENSION: ICD-10-CM

## 2025-01-23 RX ORDER — METOPROLOL SUCCINATE 25 MG/1
25 TABLET, EXTENDED RELEASE ORAL DAILY
Qty: 90 TABLET | Refills: 0 | Status: SHIPPED | OUTPATIENT
Start: 2025-01-23

## 2025-01-23 RX ORDER — OMEPRAZOLE 40 MG/1
CAPSULE, DELAYED RELEASE ORAL
Qty: 90 CAPSULE | Refills: 1 | Status: SHIPPED | OUTPATIENT
Start: 2025-01-23

## 2025-01-23 RX ORDER — ATORVASTATIN CALCIUM 40 MG/1
40 TABLET, FILM COATED ORAL DAILY
Qty: 90 TABLET | Refills: 0 | Status: SHIPPED | OUTPATIENT
Start: 2025-01-23

## 2025-01-23 RX ORDER — IRBESARTAN 150 MG/1
150 TABLET ORAL AT BEDTIME
Qty: 90 TABLET | Refills: 0 | Status: SHIPPED | OUTPATIENT
Start: 2025-01-23

## 2025-03-06 ENCOUNTER — TRANSFERRED RECORDS (OUTPATIENT)
Dept: HEALTH INFORMATION MANAGEMENT | Facility: CLINIC | Age: 75
End: 2025-03-06
Payer: MEDICARE

## 2025-04-08 DIAGNOSIS — I10 BENIGN ESSENTIAL HYPERTENSION: ICD-10-CM

## 2025-04-08 DIAGNOSIS — E78.00 PURE HYPERCHOLESTEROLEMIA: ICD-10-CM

## 2025-04-08 RX ORDER — ATORVASTATIN CALCIUM 40 MG/1
40 TABLET, FILM COATED ORAL DAILY
Qty: 90 TABLET | Refills: 3 | Status: SHIPPED | OUTPATIENT
Start: 2025-04-08

## 2025-04-08 RX ORDER — METOPROLOL SUCCINATE 25 MG/1
25 TABLET, EXTENDED RELEASE ORAL DAILY
Qty: 90 TABLET | Refills: 2 | Status: SHIPPED | OUTPATIENT
Start: 2025-04-08

## 2025-04-08 RX ORDER — IRBESARTAN 150 MG/1
150 TABLET ORAL AT BEDTIME
Qty: 90 TABLET | Refills: 2 | Status: SHIPPED | OUTPATIENT
Start: 2025-04-08

## 2025-05-02 ENCOUNTER — PATIENT OUTREACH (OUTPATIENT)
Dept: INTERNAL MEDICINE | Facility: CLINIC | Age: 75
End: 2025-05-02

## 2025-05-02 ENCOUNTER — APPOINTMENT (OUTPATIENT)
Dept: MRI IMAGING | Facility: CLINIC | Age: 75
End: 2025-05-02
Attending: EMERGENCY MEDICINE
Payer: MEDICARE

## 2025-05-02 ENCOUNTER — HOSPITAL ENCOUNTER (EMERGENCY)
Facility: CLINIC | Age: 75
Discharge: HOME | End: 2025-05-02
Attending: EMERGENCY MEDICINE | Admitting: EMERGENCY MEDICINE
Payer: MEDICARE

## 2025-05-02 VITALS
TEMPERATURE: 98.1 F | OXYGEN SATURATION: 98 % | HEART RATE: 73 BPM | RESPIRATION RATE: 18 BRPM | SYSTOLIC BLOOD PRESSURE: 135 MMHG | DIASTOLIC BLOOD PRESSURE: 62 MMHG

## 2025-05-02 DIAGNOSIS — D32.9 MENINGIOMA (H): ICD-10-CM

## 2025-05-02 DIAGNOSIS — M54.2 NECK PAIN ON LEFT SIDE: ICD-10-CM

## 2025-05-02 DIAGNOSIS — I67.1 RIGHT INTERNAL CAROTID ARTERY ANEURYSM: ICD-10-CM

## 2025-05-02 LAB
ANION GAP SERPL CALCULATED.3IONS-SCNC: 14 MMOL/L (ref 7–15)
ATRIAL RATE - MUSE: 65 BPM
BASOPHILS # BLD AUTO: 0 10E3/UL (ref 0–0.2)
BASOPHILS NFR BLD AUTO: 1 %
BUN SERPL-MCNC: 19.9 MG/DL (ref 8–23)
CALCIUM SERPL-MCNC: 9.5 MG/DL (ref 8.8–10.4)
CHLORIDE SERPL-SCNC: 107 MMOL/L (ref 98–107)
CREAT SERPL-MCNC: 0.99 MG/DL (ref 0.51–0.95)
DIASTOLIC BLOOD PRESSURE - MUSE: NORMAL MMHG
EGFRCR SERPLBLD CKD-EPI 2021: 60 ML/MIN/1.73M2
EOSINOPHIL # BLD AUTO: 0.2 10E3/UL (ref 0–0.7)
EOSINOPHIL NFR BLD AUTO: 4 %
ERYTHROCYTE [DISTWIDTH] IN BLOOD BY AUTOMATED COUNT: 13.6 % (ref 10–15)
GLUCOSE SERPL-MCNC: 106 MG/DL (ref 70–99)
HCO3 SERPL-SCNC: 20 MMOL/L (ref 22–29)
HCT VFR BLD AUTO: 41 % (ref 35–47)
HGB BLD-MCNC: 14.2 G/DL (ref 11.7–15.7)
IMM GRANULOCYTES # BLD: 0 10E3/UL
IMM GRANULOCYTES NFR BLD: 0 %
INTERPRETATION ECG - MUSE: NORMAL
LYMPHOCYTES # BLD AUTO: 1.6 10E3/UL (ref 0.8–5.3)
LYMPHOCYTES NFR BLD AUTO: 28 %
MCH RBC QN AUTO: 31 PG (ref 26.5–33)
MCHC RBC AUTO-ENTMCNC: 34.6 G/DL (ref 31.5–36.5)
MCV RBC AUTO: 90 FL (ref 78–100)
MONOCYTES # BLD AUTO: 0.5 10E3/UL (ref 0–1.3)
MONOCYTES NFR BLD AUTO: 9 %
NEUTROPHILS # BLD AUTO: 3.3 10E3/UL (ref 1.6–8.3)
NEUTROPHILS NFR BLD AUTO: 59 %
NRBC # BLD AUTO: 0 10E3/UL
NRBC BLD AUTO-RTO: 0 /100
P AXIS - MUSE: 80 DEGREES
PLATELET # BLD AUTO: 207 10E3/UL (ref 150–450)
POTASSIUM SERPL-SCNC: 4.2 MMOL/L (ref 3.4–5.3)
PR INTERVAL - MUSE: 184 MS
QRS DURATION - MUSE: 68 MS
QT - MUSE: 408 MS
QTC - MUSE: 424 MS
R AXIS - MUSE: 12 DEGREES
RBC # BLD AUTO: 4.58 10E6/UL (ref 3.8–5.2)
SODIUM SERPL-SCNC: 141 MMOL/L (ref 135–145)
SYSTOLIC BLOOD PRESSURE - MUSE: NORMAL MMHG
T AXIS - MUSE: -1 DEGREES
VENTRICULAR RATE- MUSE: 65 BPM
WBC # BLD AUTO: 5.6 10E3/UL (ref 4–11)

## 2025-05-02 PROCEDURE — 93005 ELECTROCARDIOGRAM TRACING: CPT

## 2025-05-02 PROCEDURE — 96374 THER/PROPH/DIAG INJ IV PUSH: CPT | Mod: 59

## 2025-05-02 PROCEDURE — 70546 MR ANGIOGRAPH HEAD W/O&W/DYE: CPT | Mod: XU

## 2025-05-02 PROCEDURE — 99285 EMERGENCY DEPT VISIT HI MDM: CPT | Mod: 25

## 2025-05-02 PROCEDURE — 36415 COLL VENOUS BLD VENIPUNCTURE: CPT | Performed by: EMERGENCY MEDICINE

## 2025-05-02 PROCEDURE — 70549 MR ANGIOGRAPH NECK W/O&W/DYE: CPT

## 2025-05-02 PROCEDURE — 85004 AUTOMATED DIFF WBC COUNT: CPT | Performed by: EMERGENCY MEDICINE

## 2025-05-02 PROCEDURE — A9585 GADOBUTROL INJECTION: HCPCS | Performed by: EMERGENCY MEDICINE

## 2025-05-02 PROCEDURE — 70553 MRI BRAIN STEM W/O & W/DYE: CPT

## 2025-05-02 PROCEDURE — 255N000002 HC RX 255 OP 636: Performed by: EMERGENCY MEDICINE

## 2025-05-02 PROCEDURE — 80048 BASIC METABOLIC PNL TOTAL CA: CPT | Performed by: EMERGENCY MEDICINE

## 2025-05-02 PROCEDURE — 250N000013 HC RX MED GY IP 250 OP 250 PS 637: Performed by: EMERGENCY MEDICINE

## 2025-05-02 PROCEDURE — 250N000011 HC RX IP 250 OP 636: Mod: JZ | Performed by: EMERGENCY MEDICINE

## 2025-05-02 RX ORDER — HYDROCODONE BITARTRATE AND ACETAMINOPHEN 5; 325 MG/1; MG/1
1 TABLET ORAL ONCE
Status: COMPLETED | OUTPATIENT
Start: 2025-05-02 | End: 2025-05-02

## 2025-05-02 RX ORDER — KETOROLAC TROMETHAMINE 15 MG/ML
15 INJECTION, SOLUTION INTRAMUSCULAR; INTRAVENOUS ONCE
Status: COMPLETED | OUTPATIENT
Start: 2025-05-02 | End: 2025-05-02

## 2025-05-02 RX ORDER — GADOBUTROL 604.72 MG/ML
10 INJECTION INTRAVENOUS ONCE
Status: COMPLETED | OUTPATIENT
Start: 2025-05-02 | End: 2025-05-02

## 2025-05-02 RX ORDER — SENNA AND DOCUSATE SODIUM 50; 8.6 MG/1; MG/1
1 TABLET, FILM COATED ORAL AT BEDTIME
Qty: 5 TABLET | Refills: 0 | Status: SHIPPED | OUTPATIENT
Start: 2025-05-02 | End: 2025-05-07

## 2025-05-02 RX ORDER — HYDROCODONE BITARTRATE AND ACETAMINOPHEN 5; 325 MG/1; MG/1
1 TABLET ORAL EVERY 6 HOURS PRN
Qty: 10 TABLET | Refills: 0 | Status: SHIPPED | OUTPATIENT
Start: 2025-05-02 | End: 2025-05-05

## 2025-05-02 RX ADMIN — KETOROLAC TROMETHAMINE 15 MG: 15 INJECTION, SOLUTION INTRAMUSCULAR; INTRAVENOUS at 07:52

## 2025-05-02 RX ADMIN — GADOBUTROL 10 ML: 604.72 INJECTION INTRAVENOUS at 12:19

## 2025-05-02 RX ADMIN — HYDROCODONE BITARTRATE AND ACETAMINOPHEN 1 TABLET: 5; 325 TABLET ORAL at 12:34

## 2025-05-02 ASSESSMENT — COLUMBIA-SUICIDE SEVERITY RATING SCALE - C-SSRS
1. IN THE PAST MONTH, HAVE YOU WISHED YOU WERE DEAD OR WISHED YOU COULD GO TO SLEEP AND NOT WAKE UP?: NO
6. HAVE YOU EVER DONE ANYTHING, STARTED TO DO ANYTHING, OR PREPARED TO DO ANYTHING TO END YOUR LIFE?: NO
2. HAVE YOU ACTUALLY HAD ANY THOUGHTS OF KILLING YOURSELF IN THE PAST MONTH?: NO

## 2025-05-02 ASSESSMENT — ACTIVITIES OF DAILY LIVING (ADL)
ADLS_ACUITY_SCORE: 41

## 2025-05-02 NOTE — DISCHARGE INSTRUCTIONS
Your imaging today did not show signs of a stroke, bleeding in the brain, or narrowing or disease in the blood vessels in your neck.  However, as we noted you do have incidental finding of a very tiny aneurysm in the right carotid artery inside the head.  You also have a small meningioma.  I recommend discussing these with neurosurgery.  Return to the ED right away if you develop worsening pain, new numbness, weakness, or other symptoms.    Discharge Instructions  Neck Strain    You have been seen today for a neck sprain or strain.  Neck strains usually result from an injury to the neck. Car accidents, contact sports, and falls are common causes of neck strain. Sometimes your neck can start to hurt because of increased activity, muscle tension, an abnormal sleeping position, or because of other problems like arthritis in the neck.     Neck pain usually comes from injured muscles and ligaments. Sometimes there is a herniated ( slipped ) disc. We do not usually do MRI scans to look for these right away, since most herniated discs will get better on their own with time. Today, we did not find any evidence that your neck pain was caused by a serious or dangerous condition. However, sometimes symptoms develop over time and cannot be found during an emergency visit, so it is very important that you follow up with your primary provider.    Generally, every Emergency Department visit should have a follow-up clinic visit with either a primary or a specialty clinic/provider. Please follow-up as instructed by your emergency provider today.    Return to the Emergency Department if:  You have increasing pain in your neck.  You develop difficulty swallowing or breathing.  You have numbness, weakness, or trouble moving your arms or legs.  You have severe dizziness and difficulty walking.  You are unable to control your bladder or bowels.  You develop severe headache or ringing in the ears.    What can I do to help myself at  home?  If you had an injury, use cold for the first 1-2 days. Cold helps relieve pain and reduce inflammation.  Apply ice packs to the neck or areas of pain every 1-2 hours for 20 minutes at a time. Place a towel or cloth between your skin and the ice pack.  After the first 2 days, using heat can help with neck pain and stiffness. You may use a warm shower or bath, warm towels on the neck, or a heating pad. Do not sleep with a heating pad, as you can be burned.   Pain medications - You may take a pain medication such as Tylenol  (acetaminophen), Advil  and Motrin  (ibuprofen), or Aleve  (naproxen).  It is usually best to rest the neck for 1-2 days after an injury, then start gentle stretching exercises.   It is helpful to place a small pillow under the nape of your neck to provide proper neutral positioning.   You should stay active and do your usual work as much as you can, unless this involves heavy physical labor. Ask your provider if you need work restrictions.  If you were given a prescription for medicine here today, be sure to read all of the information (including the package insert) that comes with your prescription.  This will include important information about the medicine, its side effects, and any warnings that you need to know about.  The pharmacist who fills the prescription can provide more information and answer questions you may have about the medicine.  If you have questions or concerns that the pharmacist cannot address, please call or return to the Emergency Department.   Remember that you can always come back to the Emergency Department if you are not able to see your regular provider in the amount of time listed above, if you get any new symptoms, or if there is anything that worries you.

## 2025-05-02 NOTE — ED PROVIDER NOTES
Emergency Department Note      History of Present Illness     Chief Complaint   Neck Pain      HPI   Debby Chan is a 74 year old female with a history of coronary artery disease and hypertension here for evaluation of neck pain. The patient reports sudden onset of left neck pain that radiates down from her chin to her left arm and left upper back 2 days ago. She was washing her hair and looked up, which is when the pain started. She states the she her eft cheek feels numb similar to the feeling she gets on Novocain. She took an 81 mg aspirin and placed hot and cold packs on the pain area with no relief. She also reports a headache across the front of her head. She does reports drifting to the right side while walking. She denies troubles with speech or swallowing, double vision, facial drooping, or weakness in her arm. She denies history of stroke or heart attack. She had a tooth pulled 2 weeks ago.    Independent Historian   None    Review of External Notes   I reviewed a PCP visit from 12/11/24- she has a history of hypertension and coronary artery disease    Past Medical History     Medical History and Problem List   Hypertension  Coronary artery disease  Acid reflux disease  Hypercholesterolemia    Medications   Aspirin 81  Lipitor  Avapro  Toprol XL    Surgical History   Tubal ligation  Bilateral cataract extraction  Left heart catheterization    Physical Exam     Patient Vitals for the past 24 hrs:   BP Temp Temp src Pulse Resp SpO2   05/02/25 1333 135/62 -- -- 73 -- --   05/02/25 1234 (!) 156/69 -- -- 69 -- --   05/02/25 0755 -- -- -- -- -- 98 %   05/02/25 0641 (!) 158/73 98.1  F (36.7  C) Oral 69 18 99 %     Physical Exam  General: alert, seated on a chair in her room.  HENT: mucous membranes moist.  Right posterior neck is tender.  No mass, swelling, erythema, or warmth.  CV: regular rate, regular rhythm  Resp: normal effort, clear throughout, no crackles or wheezing  GI: abdomen soft and  nontender, no guarding  MSK: no bony tenderness  Skin: appropriately warm and dry  Extremities: no edema, calves non-tender  Neuro: alert, clear speech, oriented.  PERRL, EOMI.  Symmetric smile, normal tongue elevation, symmetric tongue protrusion.  No pronator drift.  Strength 5/5 triceps, biceps, hand .  Strength 5/5 knee extension, DF, PF bilaterally.  Fine touch sensation intact in V1-V3, bilateral forearms, bilateral lower legs.  Finger nose finger and heel to shin intact bilaterally.  Normal gait.  Psych: normal mood and affect      Diagnostics     Lab Results   Labs Ordered and Resulted from Time of ED Arrival to Time of ED Departure   BASIC METABOLIC PANEL - Abnormal       Result Value    Sodium 141      Potassium 4.2      Chloride 107      Carbon Dioxide (CO2) 20 (*)     Anion Gap 14      Urea Nitrogen 19.9      Creatinine 0.99 (*)     GFR Estimate 60 (*)     Calcium 9.5      Glucose 106 (*)    CBC WITH PLATELETS AND DIFFERENTIAL    WBC Count 5.6      RBC Count 4.58      Hemoglobin 14.2      Hematocrit 41.0      MCV 90      MCH 31.0      MCHC 34.6      RDW 13.6      Platelet Count 207      % Neutrophils 59      % Lymphocytes 28      % Monocytes 9      % Eosinophils 4      % Basophils 1      % Immature Granulocytes 0      NRBCs per 100 WBC 0      Absolute Neutrophils 3.3      Absolute Lymphocytes 1.6      Absolute Monocytes 0.5      Absolute Eosinophils 0.2      Absolute Basophils 0.0      Absolute Immature Granulocytes 0.0      Absolute NRBCs 0.0         Imaging   MR Brain w/o & w Contrast   Final Result   IMPRESSION:   HEAD MRI:   1.  No findings to explain patient's symptoms. No acute intracranial pathology.   2.  Small right frontal convexity meningioma. No associated reactive brain vasogenic edema or significant mass effect.      HEAD MRA:   1.  Widely patent major intracranial arteries. No stenosis or occlusion.    2.  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      NECK MRA:   1. Normal neck MRA.         MRA Brain ([facility]) wo & w Contrast   Final Result   IMPRESSION:   HEAD MRI:   1.  No findings to explain patient's symptoms. No acute intracranial pathology.   2.  Small right frontal convexity meningioma. No associated reactive brain vasogenic edema or significant mass effect.      HEAD MRA:   1.  Widely patent major intracranial arteries. No stenosis or occlusion.    2.  2 mm right internal carotid artery proximal cavernous segment saccular aneurysm (extradural).      NECK MRA:   1. Normal neck MRA.         MRA Neck ([place]) wo & w Contrast   Final Result   IMPRESSION:   HEAD MRI:   1.  No findings to explain patient's symptoms. No acute intracranial pathology.   2.  Small right frontal convexity meningioma. No associated reactive brain vasogenic edema or significant mass effect.      HEAD MRA:   1.  Widely patent major intracranial arteries. No stenosis or occlusion.    2.  2 mm right internal carotid artery proximal cavernous segment saccular aneurysm (extradural).      NECK MRA:   1. Normal neck MRA.             EKG   ECG taken at 0746, ECG read at 0802  Normal sinus rhythm  Normal ECG   No change as compared to prior, dated 7/1/24.  Rate 65 bpm. MD interval 1844 ms. QRS duration 68 ms. QT/QTc 408/424 ms. P-R-T axes 80 12 -1.    Independent Interpretation   None    ED Course      Medications Administered   Medications - No data to display    Procedures   Procedures     Discussion of Management   None    ED Course   ED Course as of 05/02/25 1327   Fri May 02, 2025   0714 I obtained history and examined the patient as noted above.    1044 I rechecked and updated the patient.   1138 I obtained history and examined the patient as noted above.   1309 I rechecked and updated the patient.       Additional Documentation  None    Medical Decision Making / Diagnosis     CMS Diagnoses: None    MIPS       None    [facility]   Debby Chan is a 74  "year old female with a history of coronary artery disease, hypertension who presents with left-sided neck pain, left facial numbness, and 1 episode of ataxia that has since resolved.  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  Fortunately, no evidence for stroke, dissection, or plaque involving vessels.  She is feeling better following the above interventions.  I recommended continuing baby aspirin daily, and placed referrals for follow up with neurosurgery and neurology.  We noted that her blood pressure was elevated in the ED - she will discuss this with her cardiologist in 6 days.  At this point, suspicion for TIA is very low.  No signs of weakness on exam to prompt emergent cervical spine MRI, though we discussed symptoms could be related to cervical radiculopathy.  Return to the ED for weakness, slurred speech, vision changes, worsening pain, or any other concerns.    Disposition   The patient was discharged.     Diagnosis     ICD-10-CM    1. Neck pain on left side  M54.2 Adult Neurology  Referral      2. Right internal carotid artery aneurysm  I67.1 Adult Neurosurgery  Referral      3. Meningioma (H)  D32.9 Adult Neurosurgery  Referral           Discharge Medications   Discharge Medication List as of 5/2/2025  1:29 PM        START taking these medications    Details   HYDROcodone-acetaminophen (NORCO) 5-325 MG tablet Take 1 tablet by mouth every 6 hours as needed for moderate pain., Disp-10 tablet, R-0, Local Print      SENNA-docusate sodium (SENNA S) 8.6-50 MG tablet Take 1 tablet by mouth at bedtime for 5 days., Disp-5 tablet, R-0, Local Print               Scribe Disclosure:  IGino, am serving as a scribe at 7:14 AM on 5/2/2025 " to document services personally performed by Megan Camejo MD based on my observations and the provider's statements to me.        Megan Camejo MD  05/02/25 2002

## 2025-05-02 NOTE — ED TRIAGE NOTES
Patient reports neck pain x 2 days and denies any injury.     Triage Assessment (Adult)       Row Name 05/02/25 0638          Triage Assessment    Airway WDL WDL        Respiratory WDL    Respiratory WDL WDL        Skin Circulation/Temperature WDL    Skin Circulation/Temperature WDL WDL        Cardiac WDL    Cardiac WDL WDL        Peripheral/Neurovascular WDL    Peripheral Neurovascular WDL WDL        Cognitive/Neuro/Behavioral WDL    Cognitive/Neuro/Behavioral WDL WDL

## 2025-05-05 ENCOUNTER — OFFICE VISIT (OUTPATIENT)
Dept: INTERNAL MEDICINE | Facility: CLINIC | Age: 75
End: 2025-05-05
Payer: MEDICARE

## 2025-05-05 VITALS
HEIGHT: 65 IN | DIASTOLIC BLOOD PRESSURE: 70 MMHG | SYSTOLIC BLOOD PRESSURE: 120 MMHG | BODY MASS INDEX: 26.59 KG/M2 | HEART RATE: 68 BPM | OXYGEN SATURATION: 98 % | TEMPERATURE: 97.3 F | WEIGHT: 159.6 LBS

## 2025-05-05 DIAGNOSIS — E78.5 HYPERLIPIDEMIA LDL GOAL <70: ICD-10-CM

## 2025-05-05 DIAGNOSIS — I67.1 RIGHT INTERNAL CAROTID ARTERY ANEURYSM: ICD-10-CM

## 2025-05-05 DIAGNOSIS — R00.2 PALPITATIONS: ICD-10-CM

## 2025-05-05 DIAGNOSIS — I10 ESSENTIAL HYPERTENSION: ICD-10-CM

## 2025-05-05 DIAGNOSIS — I25.10 CORONARY ARTERY DISEASE INVOLVING NATIVE CORONARY ARTERY OF NATIVE HEART WITHOUT ANGINA PECTORIS: Primary | ICD-10-CM

## 2025-05-05 DIAGNOSIS — M54.2 NECK PAIN ON LEFT SIDE: Primary | ICD-10-CM

## 2025-05-05 DIAGNOSIS — D32.9 MENINGIOMA (H): ICD-10-CM

## 2025-05-05 PROCEDURE — 3074F SYST BP LT 130 MM HG: CPT | Performed by: INTERNAL MEDICINE

## 2025-05-05 PROCEDURE — 3078F DIAST BP <80 MM HG: CPT | Performed by: INTERNAL MEDICINE

## 2025-05-05 PROCEDURE — 99213 OFFICE O/P EST LOW 20 MIN: CPT | Performed by: INTERNAL MEDICINE

## 2025-05-05 PROCEDURE — G2211 COMPLEX E/M VISIT ADD ON: HCPCS | Performed by: INTERNAL MEDICINE

## 2025-05-05 NOTE — PROGRESS NOTES
"  ASSESSMENT/PLAN                                                      (M54.2) Neck pain on left side  (primary encounter diagnosis)  Comment: improving over time.  Plan: if symptoms worsen, change, or do not improve, patient to contact MD.      (D32.9) Meningioma (H)  Comment: small right frontal meningioma.  Plan: scheduled for neurosurgery follow-up; periodic surveillance likely not needed.    (I67.1) Right internal carotid artery aneurysm  Comment: 2 mm right ICA aneurysm.  Plan: scheduled for neurosurgery follow-up; neurosurgery would likely recommend periodic surveillance.    Ida Lyles MD   [address] [address]  T: [phone], F: [phone]    SUBJECTIVE                                                      Debby Chan is a very pleasant 74 year old female who presents for ER follow-up:    Patient was seen in the ER 5/2/2025 with left-sided neck pain. Evaluation including labs, EKG, and imaging generally unremarkable other than elevated blood pressure and incidental findings of a 2 mm right ICA aneurysm and right frontal meningioma. Referrals for further evaluation of incidental findings placed.     Blood pressure is back to normal today. Patient reports that her left neck pain is still present but is improving over time. Pain is quite mild. She is scheduled for neurology and neurosurgery follow-up.    OBJECTIVE                                                      /70   Pulse 68   Temp 97.3  F (36.3  C)   Ht 1.651 m (5' 5\")   Wt 72.4 kg (159 lb 9.6 oz)   LMP  (LMP Unknown)   SpO2 98%   BMI 26.56 kg/m    Constitutional: well-appearing  Respiratory: normal respiratory effort; clear to auscultation bilaterally  Cardiovascular: regular rate and rhythm; no edema  Musculoskeletal: normal gait and station  Psych: normal judgment and insight; normal mood and affect; recent and remote memory intact    ---    (Note documentation was " completed, in part, with Dragon voice-recognition software. Documentation was reviewed, but some grammatical, spelling, and word errors may remain.)

## 2025-05-06 ENCOUNTER — LAB (OUTPATIENT)
Dept: LAB | Facility: CLINIC | Age: 75
End: 2025-05-06
Payer: MEDICARE

## 2025-05-06 DIAGNOSIS — E78.5 HYPERLIPIDEMIA LDL GOAL <70: ICD-10-CM

## 2025-05-06 DIAGNOSIS — I25.10 CORONARY ARTERY DISEASE INVOLVING NATIVE CORONARY ARTERY OF NATIVE HEART WITHOUT ANGINA PECTORIS: ICD-10-CM

## 2025-05-06 LAB
ALT SERPL W P-5'-P-CCNC: 31 U/L (ref 0–50)
CHOLEST SERPL-MCNC: 167 MG/DL
FASTING STATUS PATIENT QL REPORTED: YES
HDLC SERPL-MCNC: 70 MG/DL
LDLC SERPL CALC-MCNC: 74 MG/DL
NONHDLC SERPL-MCNC: 97 MG/DL
TRIGL SERPL-MCNC: 117 MG/DL

## 2025-05-06 PROCEDURE — 36415 COLL VENOUS BLD VENIPUNCTURE: CPT

## 2025-05-06 PROCEDURE — 80061 LIPID PANEL: CPT

## 2025-05-06 PROCEDURE — 84460 ALANINE AMINO (ALT) (SGPT): CPT

## 2025-05-07 ENCOUNTER — RESULTS FOLLOW-UP (OUTPATIENT)
Dept: CARDIOLOGY | Facility: CLINIC | Age: 75
End: 2025-05-07

## 2025-05-08 ENCOUNTER — OFFICE VISIT (OUTPATIENT)
Dept: CARDIOLOGY | Facility: CLINIC | Age: 75
End: 2025-05-08
Payer: MEDICARE

## 2025-05-08 VITALS
BODY MASS INDEX: 26.66 KG/M2 | HEART RATE: 78 BPM | DIASTOLIC BLOOD PRESSURE: 75 MMHG | SYSTOLIC BLOOD PRESSURE: 125 MMHG | WEIGHT: 160 LBS | OXYGEN SATURATION: 98 % | HEIGHT: 65 IN

## 2025-05-08 DIAGNOSIS — I10 ESSENTIAL HYPERTENSION: ICD-10-CM

## 2025-05-08 DIAGNOSIS — E78.5 HYPERLIPIDEMIA LDL GOAL <70: ICD-10-CM

## 2025-05-08 DIAGNOSIS — I25.10 CORONARY ARTERY DISEASE INVOLVING NATIVE CORONARY ARTERY OF NATIVE HEART WITHOUT ANGINA PECTORIS: Primary | ICD-10-CM

## 2025-05-08 NOTE — LETTER
5/8/2025    Ida Lyles MD  [address] [facility] [address]    RE: Debby Chan       Dear Colleague,     I had the pleasure of seeing Debby Chan in the [facility].    Cardiology Clinic Progress Note  Debby Chan MRN# [id number]   YOB: 1950 Age: 74 year old   Primary Cardiologist: Dr. Nghia Grant Reason for visit: Annual follow-up            Assessment and Plan:       1.  Nonflow-limiting coronary artery disease  -2020 coronary angiogram demonstrating diffuse 30% left main disease, 60% smooth mid LAD stenosis with negative IFR, 25% mid RCA stenosis  -2021 Lexiscan nuclear stress test negative for ischemia or infarction    2.  Tachycardia, resolved   - 3/2024 21-day event monitor without any arrhythmias correlating to symptoms of heart racing    3.  Hyperlipidemia, with LDL >goal   -5/2025 LDL 74     Connie overall is feeling well without any exertional symptoms that could be concerning for angina.  Her nighttime symptoms are concerning for angina given the timeline they have been occurring and rare frequency.  I did  her regarding if she should have any change in symptoms that she should let us know we would plan to repeat stress testing at that time.  We reviewed her lipid profile which showed a slight backslide in her LDL, likely due to weight gain over the last year, less activity over the winter, and and dietary indiscretions.  We discussed the option of increasing her atorvastatin versus working on lifestyle modifications, which the latter was her preference.  Will plan to repeat a fasting lipid profile in 3 months and if it still elevated at that time would increase atorvastatin to 80 mg daily.  Her blood pressure is well-controlled.  She does routinely follow-up with her primary care provider as well.  Will plan to see her back in 1 year or sooner if needed.    Plan:  Repeat FLP in 3 months and in 1 year with a repeat BMP    Continue aspirin 81 mg daily and atorvastatin 40 mg daily  Continue irbesartan 150 mg daily and metoprolol XL 25 mg daily    Follow up: Follow up with Dr. Martin in 1 year         History of Presenting Illness:    Debby Chan is a very pleasant 74 year old female with a history of moderate nonflow-limiting coronary artery disease, and hyperlipidemia.    She was seen by Dr. Nghia Grant in early 2024 at which time she was reporting tachycardia.  She wore a 21-day event monitor which did not show any arrhythmias at the time of monitor trigger with feeling of tachycardia, chest discomfort, or dizziness.  Monitor triggers correlated with sinus rhythm.  She also had asymptomatic PACs and PVCs.  She was also reporting some atypical chest discomfort occurring at rest, not felt to be an anginal equivalent as she had a excellent exertional capacity without any exertional symptoms.    Patient is here today for an annual follow up     Patient reports feeling good.  She just returned from a cruise in the [place] a couple of weeks ago.  She has gained about 7 pounds since last year. She is working on cutting back on her sugar. Drinks 2 glasses of wine a month. She walks 5 days a week about 3.5 miles without any symptoms during the warmer months.      She continues to have chest pain that awakens her at night at exactly 2:30am, occurring about once a month, and lasts a few minutes.  She tells me this has been occurring for about 40 years.  She has not had any issues with tachycardia symptoms over the last year.  She will occasionally hear her heart pounding in her ears, however this does not occur very often.    Denies dizziness, lightheadedness or other presyncopal symptoms.  Denies shortness of breath, orthopnea, or PND.    Most recent labs from 5/6/2025 show total cholesterol 167, HDL 70, LDL 74, triglycerides 117, sodium 141, potassium 4.2, BUN 90.9, creatinine 0.9, GFR 60, ALT 31, hemoglobin 14.2,  platelets 207.     Blood pressure 125/75 and HR 78 in clinic today.        Recent Hospitalizations   None in 2025          Social History      Social History     Socioeconomic History     Marital status:      Spouse name: Not on file     Number of children: Not on file     Years of education: Not on file     Highest education level: Not on file   Occupational History     Occupation: Retired from [employer]   Tobacco Use     Smoking status: Never     Smokeless tobacco: Never   Vaping Use     Vaping status: Never Used   Substance and Sexual Activity     Alcohol use: Yes     Comment: occasional     Drug use: No     Sexual activity: Not Currently   Other Topics Concern     Parent/sibling w/ CABG, MI or angioplasty before 65F 55M? No   Social History Narrative    .    No kids.    Walking 4-5x/week.     Social Drivers of Health     Financial Resource Strain: Low Risk  (12/6/2024)    Financial Resource Strain      Within the past 12 months, have you or your family members you live with been unable to get utilities (heat, electricity) when it was really needed?: No   Food Insecurity: Low Risk  (12/6/2024)    Food Insecurity      Within the past 12 months, did you worry that your food would run out before you got money to buy more?: No      Within the past 12 months, did the food you bought just not last and you didn t have money to get more?: No   Transportation Needs: Low Risk  (12/6/2024)    Transportation Needs      Within the past 12 months, has lack of transportation kept you from medical appointments, getting your medicines, non-medical meetings or appointments, work, or from getting things that you need?: No   Physical Activity: Sufficiently Active (12/6/2024)    Exercise Vital Sign      Days of Exercise per Week: 5 days      Minutes of Exercise per Session: 80 min   Stress: Stress Concern Present (12/6/2024)    [employer] [place] [employer] - Occupational Stress Questionnaire      Feeling of  "Stress : To some extent   Social Connections: Unknown (12/6/2024)    Social Connection and Isolation Panel [NHANES]      Frequency of Communication with Friends and Family: Not on file      Frequency of Social Gatherings with Friends and Family: Once a week      Attends Spiritism Services: Not on file      Active Member of Clubs or Organizations: Not on file      Attends Club or Organization Meetings: Not on file      Marital Status: Not on file   Interpersonal Safety: Low Risk  (12/11/2024)    Interpersonal Safety      Do you feel physically and emotionally safe where you currently live?: Yes      Within the past 12 months, have you been hit, slapped, kicked or otherwise physically hurt by someone?: No      Within the past 12 months, have you been humiliated or emotionally abused in other ways by your partner or ex-partner?: No   Housing Stability: Low Risk  (12/6/2024)    Housing Stability      Do you have housing? : Yes      Are you worried about losing your housing?: No            Review of Systems:   Skin:  Negative     Eyes:  Negative    ENT:  Negative    Respiratory:  Positive for dyspnea on exertion  Cardiovascular:    Positive for, lightheadedness  Gastroenterology: Positive for reflux  Genitourinary:  not assessed    Musculoskeletal:  Positive for nocturnal cramping, foot pain  Neurologic:  Negative headaches (occasional sharp pain in temple area)  Psychiatric:  Negative    Heme/Lymph/Imm:  Negative    Endocrine:  Negative           Physical Exam:   Vitals: /75 (BP Location: Left arm, Patient Position: Sitting)   Pulse 78   Ht 1.651 m (5' 5\")   Wt 72.6 kg (160 lb)   LMP  (LMP Unknown)   SpO2 98%   BMI 26.63 kg/m     Wt Readings from Last 4 Encounters:   05/08/25 72.6 kg (160 lb)   05/05/25 72.4 kg (159 lb 9.6 oz)   12/11/24 73.7 kg (162 lb 8 oz)   09/03/24 73.4 kg (161 lb 12.8 oz)     GEN: well nourished, in no acute distress.  HEENT:  Pupils equal, round. Sclerae nonicteric.   NECK: Supple, no " masses appreciated.   C/V:  Regular rate and rhythm, no murmur, rub or gallop.    RESP: Respirations are unlabored. Clear to auscultation bilaterally without wheezing, rales, or rhonchi.  GI: Abdomen soft, nontender.  EXTREM: No LE edema.  NEURO: Alert and oriented, cooperative.  SKIN: Warm and dry.        Data:     LIPID RESULTS:  Lab Results   Component Value Date    CHOL 167 05/06/2025    CHOL 158 05/27/2020    HDL 70 05/06/2025    HDL 66 05/27/2020    LDL 74 05/06/2025    LDL 64 05/27/2020    TRIG 117 05/06/2025    TRIG 142 05/27/2020    CHOLHDLRATIO 2.6 08/07/2015     LIVER ENZYME RESULTS:  Lab Results   Component Value Date    AST 24 09/01/2024    AST 17 02/19/2020    ALT 31 05/06/2025    ALT 29 02/19/2020     CBC RESULTS:  Lab Results   Component Value Date    WBC 5.6 05/02/2025    WBC 6.8 03/10/2020    RBC 4.58 05/02/2025    RBC 4.90 03/10/2020    HGB 14.2 05/02/2025    HGB 15.0 03/10/2020    HCT 41.0 05/02/2025    HCT 45.4 03/10/2020    MCV 90 05/02/2025    MCV 93 03/10/2020    MCH 31.0 05/02/2025    MCH 30.6 03/10/2020    MCHC 34.6 05/02/2025    MCHC 33.0 03/10/2020    RDW 13.6 05/02/2025    RDW 13.6 03/10/2020     05/02/2025     03/10/2020     BMP RESULTS:  Lab Results   Component Value Date     05/02/2025     03/10/2020    POTASSIUM 4.2 05/02/2025    POTASSIUM 4.6 11/29/2021    POTASSIUM 4.1 03/10/2020    CHLORIDE 107 05/02/2025    CHLORIDE 109 11/29/2021    CHLORIDE 112 (H) 03/10/2020    CO2 20 (L) 05/02/2025    CO2 23 11/29/2021    CO2 19 (L) 03/10/2020    ANIONGAP 14 05/02/2025    ANIONGAP 9 11/29/2021    ANIONGAP 7 03/10/2020     (H) 05/02/2025    GLC 86 11/29/2021    GLC 91 03/10/2020    BUN 19.9 05/02/2025    BUN 20 11/29/2021    BUN 17 03/10/2020    CR 0.99 (H) 05/02/2025    CR 0.83 03/10/2020    GFRESTIMATED 60 (L) 05/02/2025    GFRESTIMATED 72 03/10/2020    GFRESTBLACK 83 03/10/2020    CARMINA 9.5 05/02/2025    CARMINA 9.2 03/10/2020      A1C RESULTS:  Lab Results    Component Value Date    A1C 5.4 12/06/2023    A1C 5.4 04/15/2010     INR RESULTS:  Lab Results   Component Value Date    INR 1.10 03/10/2020            Medications     Current Outpatient Medications   Medication Sig Dispense Refill     Ascorbic Acid (VITAMIN C) 500 MG CAPS Take by mouth daily OTC       aspirin 81 MG EC tablet Take 1 tablet (81 mg) by mouth daily       atorvastatin (LIPITOR) 40 MG tablet TAKE 1 TABLET DAILY 90 tablet 3     Calcium Carbonate-Vitamin D (CALCIUM + D PO) Take  by mouth. Plus magnesium       Cholecalciferol (VITAMIN D) 2000 UNITS tablet Take 1 tablet by mouth daily.       desonide (DESOWEN) 0.05 % external cream Apply topically 2 times daily as needed (vulvar irrittation) DO NOT USE FOR MORE THAN 14 DAYS IN A ROW 15 g 1     irbesartan (AVAPRO) 150 MG tablet TAKE 1 TABLET AT BEDTIME 90 tablet 2     metoprolol succinate ER (TOPROL XL) 25 MG 24 hr tablet TAKE 1 TABLET DAILY 90 tablet 2     Multiple Vitamins-Minerals (WOMENS MULTIVITAMIN PO) Take by mouth daily OTC       omeprazole (PRILOSEC) 40 MG DR capsule TAKE 1 CAPSULE DAILY 30-60 MINUTES BEFORE A MEAL. 90 capsule 1     vitamin E (TOCOPHEROL) 1000 units (450 mg) CAPS capsule Take 1,000 Units by mouth daily       triamcinolone (KENALOG) 0.1 % external ointment Apply sparingly to affected area three times daily for 14 days. (Patient not taking: Reported on 5/8/2025) 30 g 0          Past Medical History     Past Medical History:   Diagnosis Date     Acid reflux disease      Benign essential hypertension      CAD (coronary artery disease)      Pure hypercholesterolemia      Past Surgical History:   Procedure Laterality Date     BREAST BIOPSY, RT/LT Right 01/01/1985    benign fibroadenoma     CATARACT EXTRACTION Right 2024    redo     CATARACT EXTRACTION, BILATERAL Bilateral 2023     CV CORONARY ANGIOGRAM N/A 03/10/2020    Procedure: Coronary Angiogram;  Surgeon: Daniel Huffman MD;  Location:  HEART CARDIAC CATH LAB     CV  FRACTIONAL FLOW RATIO WIRE N/A 03/10/2020    Procedure: Fractional Flow Reserve;  Surgeon: Daniel Huffman MD;  Location:  HEART CARDIAC CATH LAB     CV INSTANTANEOUS WAVE-FREE RATIO N/A 03/10/2020    Procedure: Instantaneous Wave-Free Ratio;  Surgeon: Daniel Huffman MD;  Location:  HEART CARDIAC CATH LAB     CV LEFT HEART CATH N/A 03/10/2020    Procedure: Left Heart Cath;  Surgeon: Daniel Huffman MD;  Location:  HEART CARDIAC CATH LAB     CV LEFT VENTRICULOGRAM N/A 03/10/2020    Procedure: Left Ventriculogram;  Surgeon: Daniel Huffman MD;  Location:  HEART CARDIAC CATH LAB     ESOPHAGOSCOPY, GASTROSCOPY, DUODENOSCOPY (EGD), COMBINED N/A 12/21/2017    Procedure: COMBINED ESOPHAGOSCOPY, GASTROSCOPY, DUODENOSCOPY (EGD), BIOPSY SINGLE OR MULTIPLE;  gastroscopy;  Surgeon: Srinivasa Jackson MD;  Location:  GI     TUBAL LIGATION  01/01/1990     Family History   Problem Relation Age of Onset     Heart Failure Mother      Myocardial Infarction Father         later in life; s/p PCI     Prostate Cancer Father      Diabetes Type 2  Father      Skin Cancer Father      Melanoma Sister      Uterine Cancer Paternal Aunt      Cerebrovascular Disease No family hx of      Coronary Artery Disease Early Onset No family hx of      Ovarian Cancer No family hx of      Colon Cancer No family hx of             Allergies   Lisinopril and Penicillins        Hayley Orlando NP  [facility]     Thank you for allowing me to participate in the care of your patient.      Sincerely,     Hayley Orlando NP     [facility]  cc:   Ranulfo Martin MD  [address] [place] [id number]

## 2025-05-08 NOTE — PATIENT INSTRUCTIONS
Today's Recommendations    I will recheck your cholesterol in 3 months, this is a fasting lab, if the LDL>70, we will plan to increase the lipitor at that time  Keep working on your healthy diet and lifestyle modifications for weight loss   Continue all medications without changes.  Please follow up with Dr. Martin in 1 year.    Please send a [employer] message or call [phone] to the RN team with questions or concerns.     Scheduling number [phone]  KADEEM Tobar, CNP

## 2025-05-08 NOTE — PROGRESS NOTES
Cardiology Clinic Progress Note  Debby Chan MRN# [id number]   YOB: 1950 Age: 74 year old   Primary Cardiologist: Dr. Nghia Grant Reason for visit: Annual follow-up            Assessment and Plan:       1.  Nonflow-limiting coronary artery disease  -2020 coronary angiogram demonstrating diffuse 30% left main disease, 60% smooth mid LAD stenosis with negative IFR, 25% mid RCA stenosis  -2021 Lexiscan nuclear stress test negative for ischemia or infarction    2.  Tachycardia, resolved   - 3/2024 21-day event monitor without any arrhythmias correlating to symptoms of heart racing    3.  Hyperlipidemia, with LDL >goal   -5/2025 LDL 74     Connie overall is feeling well without any exertional symptoms that could be concerning for angina.  Her nighttime symptoms are concerning for angina given the timeline they have been occurring and rare frequency.  I did  her regarding if she should have any change in symptoms that she should let us know we would plan to repeat stress testing at that time.  We reviewed her lipid profile which showed a slight backslide in her LDL, likely due to weight gain over the last year, less activity over the winter, and and dietary indiscretions.  We discussed the option of increasing her atorvastatin versus working on lifestyle modifications, which the latter was her preference.  Will plan to repeat a fasting lipid profile in 3 months and if it still elevated at that time would increase atorvastatin to 80 mg daily.  Her blood pressure is well-controlled.  She does routinely follow-up with her primary care provider as well.  Will plan to see her back in 1 year or sooner if needed.    Plan:  Repeat FLP in 3 months and in 1 year with a repeat BMP   Continue aspirin 81 mg daily and atorvastatin 40 mg daily  Continue irbesartan 150 mg daily and metoprolol XL 25 mg daily    Follow up: Follow up with Dr. Martin in 1 year         History of Presenting Illness:     Debby Chan is a very pleasant 74 year old female with a history of moderate nonflow-limiting coronary artery disease, and hyperlipidemia.    She was seen by Dr. Nghia Grant in early 2024 at which time she was reporting tachycardia.  She wore a 21-day event monitor which did not show any arrhythmias at the time of monitor trigger with feeling of tachycardia, chest discomfort, or dizziness.  Monitor triggers correlated with sinus rhythm.  She also had asymptomatic PACs and PVCs.  She was also reporting some atypical chest discomfort occurring at rest, not felt to be an anginal equivalent as she had a excellent exertional capacity without any exertional symptoms.    Patient is here today for an annual follow up     Patient reports feeling good.  She just returned from a cruise in the [place] a couple of weeks ago.  She has gained about 7 pounds since last year. She is working on cutting back on her sugar. Drinks 2 glasses of wine a month. She walks 5 days a week about 3.5 miles without any symptoms during the warmer months.      She continues to have chest pain that awakens her at night at exactly 2:30am, occurring about once a month, and lasts a few minutes.  She tells me this has been occurring for about 40 years.  She has not had any issues with tachycardia symptoms over the last year.  She will occasionally hear her heart pounding in her ears, however this does not occur very often.    Denies dizziness, lightheadedness or other presyncopal symptoms.  Denies shortness of breath, orthopnea, or PND.    Most recent labs from 5/6/2025 show total cholesterol 167, HDL 70, LDL 74, triglycerides 117, sodium 141, potassium 4.2, BUN 90.9, creatinine 0.9, GFR 60, ALT 31, hemoglobin 14.2, platelets 207.     Blood pressure 125/75 and HR 78 in clinic today.        Recent Hospitalizations   None in 2025          Social History      Social History     Socioeconomic History    Marital status:       Spouse name: Not on file    Number of children: Not on file    Years of education: Not on file    Highest education level: Not on file   Occupational History    Occupation: Retired from [employer]   Tobacco Use    Smoking status: Never    Smokeless tobacco: Never   Vaping Use    Vaping status: Never Used   Substance and Sexual Activity    Alcohol use: Yes     Comment: occasional    Drug use: No    Sexual activity: Not Currently   Other Topics Concern    Parent/sibling w/ CABG, MI or angioplasty before 65F 55M? No   Social History Narrative    .    No kids.    Walking 4-5x/week.     Social Drivers of Health     Financial Resource Strain: Low Risk  (12/6/2024)    Financial Resource Strain     Within the past 12 months, have you or your family members you live with been unable to get utilities (heat, electricity) when it was really needed?: No   Food Insecurity: Low Risk  (12/6/2024)    Food Insecurity     Within the past 12 months, did you worry that your food would run out before you got money to buy more?: No     Within the past 12 months, did the food you bought just not last and you didn t have money to get more?: No   Transportation Needs: Low Risk  (12/6/2024)    Transportation Needs     Within the past 12 months, has lack of transportation kept you from medical appointments, getting your medicines, non-medical meetings or appointments, work, or from getting things that you need?: No   Physical Activity: Sufficiently Active (12/6/2024)    Exercise Vital Sign     Days of Exercise per Week: 5 days     Minutes of Exercise per Session: 80 min   Stress: Stress Concern Present (12/6/2024)    [employer] [place] [employer] - Occupational Stress Questionnaire     Feeling of Stress : To some extent   Social Connections: Unknown (12/6/2024)    Social Connection and Isolation Panel [NHANES]     Frequency of Communication with Friends and Family: Not on file     Frequency of Social Gatherings with Friends and  "Family: Once a week     Attends Yazidi Services: Not on file     Active Member of Clubs or Organizations: Not on file     Attends Club or Organization Meetings: Not on file     Marital Status: Not on file   Interpersonal Safety: Low Risk  (12/11/2024)    Interpersonal Safety     Do you feel physically and emotionally safe where you currently live?: Yes     Within the past 12 months, have you been hit, slapped, kicked or otherwise physically hurt by someone?: No     Within the past 12 months, have you been humiliated or emotionally abused in other ways by your partner or ex-partner?: No   Housing Stability: Low Risk  (12/6/2024)    Housing Stability     Do you have housing? : Yes     Are you worried about losing your housing?: No            Review of Systems:   Skin:  Negative     Eyes:  Negative    ENT:  Negative    Respiratory:  Positive for dyspnea on exertion  Cardiovascular:    Positive for, lightheadedness  Gastroenterology: Positive for reflux  Genitourinary:  not assessed    Musculoskeletal:  Positive for nocturnal cramping, foot pain  Neurologic:  Negative headaches (occasional sharp pain in temple area)  Psychiatric:  Negative    Heme/Lymph/Imm:  Negative    Endocrine:  Negative           Physical Exam:   Vitals: /75 (BP Location: Left arm, Patient Position: Sitting)   Pulse 78   Ht 1.651 m (5' 5\")   Wt 72.6 kg (160 lb)   LMP  (LMP Unknown)   SpO2 98%   BMI 26.63 kg/m     Wt Readings from Last 4 Encounters:   05/08/25 72.6 kg (160 lb)   05/05/25 72.4 kg (159 lb 9.6 oz)   12/11/24 73.7 kg (162 lb 8 oz)   09/03/24 73.4 kg (161 lb 12.8 oz)     GEN: well nourished, in no acute distress.  HEENT:  Pupils equal, round. Sclerae nonicteric.   NECK: Supple, no masses appreciated.   C/V:  Regular rate and rhythm, no murmur, rub or gallop.    RESP: Respirations are unlabored. Clear to auscultation bilaterally without wheezing, rales, or rhonchi.  GI: Abdomen soft, nontender.  EXTREM: No LE edema.  NEURO: " Alert and oriented, cooperative.  SKIN: Warm and dry.        Data:     LIPID RESULTS:  Lab Results   Component Value Date    CHOL 167 05/06/2025    CHOL 158 05/27/2020    HDL 70 05/06/2025    HDL 66 05/27/2020    LDL 74 05/06/2025    LDL 64 05/27/2020    TRIG 117 05/06/2025    TRIG 142 05/27/2020    CHOLHDLRATIO 2.6 08/07/2015     LIVER ENZYME RESULTS:  Lab Results   Component Value Date    AST 24 09/01/2024    AST 17 02/19/2020    ALT 31 05/06/2025    ALT 29 02/19/2020     CBC RESULTS:  Lab Results   Component Value Date    WBC 5.6 05/02/2025    WBC 6.8 03/10/2020    RBC 4.58 05/02/2025    RBC 4.90 03/10/2020    HGB 14.2 05/02/2025    HGB 15.0 03/10/2020    HCT 41.0 05/02/2025    HCT 45.4 03/10/2020    MCV 90 05/02/2025    MCV 93 03/10/2020    MCH 31.0 05/02/2025    MCH 30.6 03/10/2020    MCHC 34.6 05/02/2025    MCHC 33.0 03/10/2020    RDW 13.6 05/02/2025    RDW 13.6 03/10/2020     05/02/2025     03/10/2020     BMP RESULTS:  Lab Results   Component Value Date     05/02/2025     03/10/2020    POTASSIUM 4.2 05/02/2025    POTASSIUM 4.6 11/29/2021    POTASSIUM 4.1 03/10/2020    CHLORIDE 107 05/02/2025    CHLORIDE 109 11/29/2021    CHLORIDE 112 (H) 03/10/2020    CO2 20 (L) 05/02/2025    CO2 23 11/29/2021    CO2 19 (L) 03/10/2020    ANIONGAP 14 05/02/2025    ANIONGAP 9 11/29/2021    ANIONGAP 7 03/10/2020     (H) 05/02/2025    GLC 86 11/29/2021    GLC 91 03/10/2020    BUN 19.9 05/02/2025    BUN 20 11/29/2021    BUN 17 03/10/2020    CR 0.99 (H) 05/02/2025    CR 0.83 03/10/2020    GFRESTIMATED 60 (L) 05/02/2025    GFRESTIMATED 72 03/10/2020    GFRESTBLACK 83 03/10/2020    CARMINA 9.5 05/02/2025    CARMINA 9.2 03/10/2020      A1C RESULTS:  Lab Results   Component Value Date    A1C 5.4 12/06/2023    A1C 5.4 04/15/2010     INR RESULTS:  Lab Results   Component Value Date    INR 1.10 03/10/2020            Medications     Current Outpatient Medications   Medication Sig Dispense Refill    Ascorbic Acid  (VITAMIN C) 500 MG CAPS Take by mouth daily OTC      aspirin 81 MG EC tablet Take 1 tablet (81 mg) by mouth daily      atorvastatin (LIPITOR) 40 MG tablet TAKE 1 TABLET DAILY 90 tablet 3    Calcium Carbonate-Vitamin D (CALCIUM + D PO) Take  by mouth. Plus magnesium      Cholecalciferol (VITAMIN D) 2000 UNITS tablet Take 1 tablet by mouth daily.      desonide (DESOWEN) 0.05 % external cream Apply topically 2 times daily as needed (vulvar irrittation) DO NOT USE FOR MORE THAN 14 DAYS IN A ROW 15 g 1    irbesartan (AVAPRO) 150 MG tablet TAKE 1 TABLET AT BEDTIME 90 tablet 2    metoprolol succinate ER (TOPROL XL) 25 MG 24 hr tablet TAKE 1 TABLET DAILY 90 tablet 2    Multiple Vitamins-Minerals (WOMENS MULTIVITAMIN PO) Take by mouth daily OTC      omeprazole (PRILOSEC) 40 MG DR capsule TAKE 1 CAPSULE DAILY 30-60 MINUTES BEFORE A MEAL. 90 capsule 1    vitamin E (TOCOPHEROL) 1000 units (450 mg) CAPS capsule Take 1,000 Units by mouth daily      triamcinolone (KENALOG) 0.1 % external ointment Apply sparingly to affected area three times daily for 14 days. (Patient not taking: Reported on 5/8/2025) 30 g 0          Past Medical History     Past Medical History:   Diagnosis Date    Acid reflux disease     Benign essential hypertension     CAD (coronary artery disease)     Pure hypercholesterolemia      Past Surgical History:   Procedure Laterality Date    BREAST BIOPSY, RT/LT Right 01/01/1985    benign fibroadenoma    CATARACT EXTRACTION Right 2024    redo    CATARACT EXTRACTION, BILATERAL Bilateral 2023    CV CORONARY ANGIOGRAM N/A 03/10/2020    Procedure: Coronary Angiogram;  Surgeon: Daniel Huffman MD;  Location:  HEART CARDIAC CATH LAB    CV FRACTIONAL FLOW RATIO WIRE N/A 03/10/2020    Procedure: Fractional Flow Reserve;  Surgeon: Daniel Huffman MD;  Location:  HEART CARDIAC CATH LAB    CV INSTANTANEOUS WAVE-FREE RATIO N/A 03/10/2020    Procedure: Instantaneous Wave-Free Ratio;  Surgeon: Daniel Huffman  MD;  Location:  HEART CARDIAC CATH LAB    CV LEFT HEART CATH N/A 03/10/2020    Procedure: Left Heart Cath;  Surgeon: Daniel Huffman MD;  Location:  HEART CARDIAC CATH LAB    CV LEFT VENTRICULOGRAM N/A 03/10/2020    Procedure: Left Ventriculogram;  Surgeon: Daniel Huffman MD;  Location:  HEART CARDIAC CATH LAB    ESOPHAGOSCOPY, GASTROSCOPY, DUODENOSCOPY (EGD), COMBINED N/A 12/21/2017    Procedure: COMBINED ESOPHAGOSCOPY, GASTROSCOPY, DUODENOSCOPY (EGD), BIOPSY SINGLE OR MULTIPLE;  gastroscopy;  Surgeon: Srinivasa Jackson MD;  Location:  GI    TUBAL LIGATION  01/01/1990     Family History   Problem Relation Age of Onset    Heart Failure Mother     Myocardial Infarction Father         later in life; s/p PCI    Prostate Cancer Father     Diabetes Type 2  Father     Skin Cancer Father     Melanoma Sister     Uterine Cancer Paternal Aunt     Cerebrovascular Disease No family hx of     Coronary Artery Disease Early Onset No family hx of     Ovarian Cancer No family hx of     Colon Cancer No family hx of             Allergies   Lisinopril and Penicillins        Hayley Orlando NP  [facility]

## 2025-05-19 ENCOUNTER — HOSPITAL ENCOUNTER (EMERGENCY)
Facility: CLINIC | Age: 75
Discharge: HOME | End: 2025-05-19
Attending: EMERGENCY MEDICINE | Admitting: EMERGENCY MEDICINE
Payer: MEDICARE

## 2025-05-19 ENCOUNTER — MYC MEDICAL ADVICE (OUTPATIENT)
Dept: CARDIOLOGY | Facility: CLINIC | Age: 75
End: 2025-05-19

## 2025-05-19 VITALS
HEART RATE: 68 BPM | DIASTOLIC BLOOD PRESSURE: 73 MMHG | TEMPERATURE: 98.2 F | RESPIRATION RATE: 15 BRPM | OXYGEN SATURATION: 98 % | SYSTOLIC BLOOD PRESSURE: 141 MMHG

## 2025-05-19 DIAGNOSIS — R20.8 FEELS HOT: ICD-10-CM

## 2025-05-19 DIAGNOSIS — R07.9 CHEST PAIN: ICD-10-CM

## 2025-05-19 DIAGNOSIS — R07.9 CHEST PAIN, UNSPECIFIED TYPE: ICD-10-CM

## 2025-05-19 DIAGNOSIS — I25.10 CORONARY ARTERY DISEASE INVOLVING NATIVE CORONARY ARTERY OF NATIVE HEART WITHOUT ANGINA PECTORIS: Primary | ICD-10-CM

## 2025-05-19 LAB
ALBUMIN SERPL BCG-MCNC: 4.5 G/DL (ref 3.5–5.2)
ALP SERPL-CCNC: 76 U/L (ref 40–150)
ALT SERPL W P-5'-P-CCNC: 26 U/L (ref 0–50)
ANION GAP SERPL CALCULATED.3IONS-SCNC: 11 MMOL/L (ref 7–15)
AST SERPL W P-5'-P-CCNC: 28 U/L (ref 0–45)
ATRIAL RATE - MUSE: 72 BPM
BASOPHILS # BLD AUTO: 0 10E3/UL (ref 0–0.2)
BASOPHILS NFR BLD AUTO: 1 %
BILIRUB SERPL-MCNC: 0.5 MG/DL
BUN SERPL-MCNC: 16.6 MG/DL (ref 8–23)
CALCIUM SERPL-MCNC: 9.6 MG/DL (ref 8.8–10.4)
CHLORIDE SERPL-SCNC: 108 MMOL/L (ref 98–107)
CREAT SERPL-MCNC: 1.07 MG/DL (ref 0.51–0.95)
DIASTOLIC BLOOD PRESSURE - MUSE: NORMAL MMHG
EGFRCR SERPLBLD CKD-EPI 2021: 54 ML/MIN/1.73M2
EOSINOPHIL # BLD AUTO: 0.2 10E3/UL (ref 0–0.7)
EOSINOPHIL NFR BLD AUTO: 4 %
ERYTHROCYTE [DISTWIDTH] IN BLOOD BY AUTOMATED COUNT: 13.4 % (ref 10–15)
GLUCOSE SERPL-MCNC: 94 MG/DL (ref 70–99)
HCO3 SERPL-SCNC: 24 MMOL/L (ref 22–29)
HCT VFR BLD AUTO: 42.9 % (ref 35–47)
HGB BLD-MCNC: 14.2 G/DL (ref 11.7–15.7)
IMM GRANULOCYTES # BLD: 0 10E3/UL
IMM GRANULOCYTES NFR BLD: 0 %
INTERPRETATION ECG - MUSE: NORMAL
LYMPHOCYTES # BLD AUTO: 3.1 10E3/UL (ref 0.8–5.3)
LYMPHOCYTES NFR BLD AUTO: 45 %
MCH RBC QN AUTO: 30.7 PG (ref 26.5–33)
MCHC RBC AUTO-ENTMCNC: 33.1 G/DL (ref 31.5–36.5)
MCV RBC AUTO: 93 FL (ref 78–100)
MONOCYTES # BLD AUTO: 0.6 10E3/UL (ref 0–1.3)
MONOCYTES NFR BLD AUTO: 9 %
NEUTROPHILS # BLD AUTO: 2.9 10E3/UL (ref 1.6–8.3)
NEUTROPHILS NFR BLD AUTO: 42 %
NRBC # BLD AUTO: 0 10E3/UL
NRBC BLD AUTO-RTO: 0 /100
P AXIS - MUSE: 67 DEGREES
PLATELET # BLD AUTO: 210 10E3/UL (ref 150–450)
POTASSIUM SERPL-SCNC: 3.9 MMOL/L (ref 3.4–5.3)
PR INTERVAL - MUSE: 194 MS
PROT SERPL-MCNC: 7 G/DL (ref 6.4–8.3)
QRS DURATION - MUSE: 78 MS
QT - MUSE: 402 MS
QTC - MUSE: 440 MS
R AXIS - MUSE: 67 DEGREES
RBC # BLD AUTO: 4.63 10E6/UL (ref 3.8–5.2)
SODIUM SERPL-SCNC: 143 MMOL/L (ref 135–145)
SYSTOLIC BLOOD PRESSURE - MUSE: NORMAL MMHG
T AXIS - MUSE: 62 DEGREES
TROPONIN T SERPL HS-MCNC: <6 NG/L
VENTRICULAR RATE- MUSE: 72 BPM
WBC # BLD AUTO: 6.9 10E3/UL (ref 4–11)

## 2025-05-19 PROCEDURE — 99284 EMERGENCY DEPT VISIT MOD MDM: CPT

## 2025-05-19 PROCEDURE — 85025 COMPLETE CBC W/AUTO DIFF WBC: CPT | Performed by: EMERGENCY MEDICINE

## 2025-05-19 PROCEDURE — 82310 ASSAY OF CALCIUM: CPT | Performed by: EMERGENCY MEDICINE

## 2025-05-19 PROCEDURE — 84484 ASSAY OF TROPONIN QUANT: CPT | Performed by: EMERGENCY MEDICINE

## 2025-05-19 PROCEDURE — 36415 COLL VENOUS BLD VENIPUNCTURE: CPT | Performed by: EMERGENCY MEDICINE

## 2025-05-19 PROCEDURE — 93005 ELECTROCARDIOGRAM TRACING: CPT

## 2025-05-19 ASSESSMENT — ACTIVITIES OF DAILY LIVING (ADL): ADLS_ACUITY_SCORE: 41

## 2025-05-19 NOTE — DISCHARGE INSTRUCTIONS
As we discussed, no clear cause of your symptoms was found here today, but since you have had this for some time, and your blood work here is highly reassuring with no evidence of heart damage, I do think that you are stable to follow-up with your regular doctor in the week ahead.  We offer you pain medication here which you politely declined, please nonetheless to follow with your regular doctor and ask if you need to be referred to a chronic pain specialist.  Please come back to the ER immediately with any other pain that you have or any new symptoms that develop.

## 2025-05-19 NOTE — CONFIDENTIAL NOTE
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  Year last updated Action     YES   [x]   2013   Please update at appointment if outdated more than 5 years       NO     []   N/A   Please complete C2C at appointment

## 2025-05-19 NOTE — TELEPHONE ENCOUNTER
I think given her known CAD history and ongoing chest pain, although atypical, it would be reasonable to have her get a stress test and see me in clinic after for review. Her last stress test in 2021 was a lexiscan nuclear stress test. If she is unable to exercise, it would be the appropriate test to repeat. Thanks.

## 2025-05-19 NOTE — ED TRIAGE NOTES
"Patient arrives with chest pain and a headache that woke her from her sleep. She reports it felt like \"something exploded in my chest\" \"searing hot pain that woke me up\". Now has some nausea and a headache with burning chest pain. Says she feels very tired now. Diagnosed with a carotid aneurysm last week. BP left arm 168/82 right arm 153/87.     Triage Assessment (Adult)       Row Name 05/19/25 0057          Triage Assessment    Airway WDL WDL        Respiratory WDL    Respiratory WDL WDL        Skin Circulation/Temperature WDL    Skin Circulation/Temperature WDL WDL        Cardiac WDL    Cardiac WDL WDL;chest pain        Peripheral/Neurovascular WDL    Peripheral Neurovascular WDL WDL        Cognitive/Neuro/Behavioral WDL    Cognitive/Neuro/Behavioral WDL WDL                     "

## 2025-05-19 NOTE — ED PROVIDER NOTES
"  Emergency Department Note      History of Present Illness     Chief Complaint  Chest Pain        HPI  Debby Chan is a 74 year old female who presents to the emergency room with pain in her chest that is described as boiling hot and searing hot and \"like a nuclear explosion that you see on TV with the mushroom cloud\".  She states that she has had this pain in her chest for the last 40 years after she walked into a clothing rack and hit it with the center of her chest at that time.  She states she used to have these flareups only a couple times a year but now it is becoming more frequent and she states that today's pain is very clearly similar to her previous pain profile although she notes that today's intensity is more than is typical for her.  Curiously she is refusing all pain medications as well.  Denies any nausea or vomiting, denies any abdominal pain, states that she does not see a chronic pain doctor.  Specifically notes that it does not radiate, no mid scapular pain, no numbness or tingling.      Independent Historian  Yes  at the bedside and confirms the above history.    Review of External Notes  Yes I have reviewed the patient's last office visit from 5 - 8 - 25 the patient was seen by her cardiologist for coronary artery disease.      Past Medical History   Medical History and Problem List  Past Medical History:   Diagnosis Date    Acid reflux disease     Benign essential hypertension     CAD (coronary artery disease)     Pure hypercholesterolemia        Medications  Ascorbic Acid (VITAMIN C) 500 MG CAPS  aspirin 81 MG EC tablet  atorvastatin (LIPITOR) 40 MG tablet  Calcium Carbonate-Vitamin D (CALCIUM + D PO)  Cholecalciferol (VITAMIN D) 2000 UNITS tablet  desonide (DESOWEN) 0.05 % external cream  irbesartan (AVAPRO) 150 MG tablet  metoprolol succinate ER (TOPROL XL) 25 MG 24 hr tablet  Multiple Vitamins-Minerals (WOMENS MULTIVITAMIN PO)  omeprazole (PRILOSEC) 40 MG DR" capsule  triamcinolone (KENALOG) 0.1 % external ointment  vitamin E (TOCOPHEROL) 1000 units (450 mg) CAPS capsule        Surgical History   Past Surgical History:   Procedure Laterality Date    BREAST BIOPSY, RT/LT Right 01/01/1985    benign fibroadenoma    CATARACT EXTRACTION Right 2024    redo    CATARACT EXTRACTION, BILATERAL Bilateral 2023    CV CORONARY ANGIOGRAM N/A 03/10/2020    Procedure: Coronary Angiogram;  Surgeon: Daniel Huffman MD;  Location:  HEART CARDIAC CATH LAB    CV FRACTIONAL FLOW RATIO WIRE N/A 03/10/2020    Procedure: Fractional Flow Reserve;  Surgeon: Daniel Huffman MD;  Location:  HEART CARDIAC CATH LAB    CV INSTANTANEOUS WAVE-FREE RATIO N/A 03/10/2020    Procedure: Instantaneous Wave-Free Ratio;  Surgeon: Daniel Huffman MD;  Location:  HEART CARDIAC CATH LAB    CV LEFT HEART CATH N/A 03/10/2020    Procedure: Left Heart Cath;  Surgeon: Daniel Huffman MD;  Location:  HEART CARDIAC CATH LAB    CV LEFT VENTRICULOGRAM N/A 03/10/2020    Procedure: Left Ventriculogram;  Surgeon: Daniel Huffman MD;  Location:  HEART CARDIAC CATH LAB    ESOPHAGOSCOPY, GASTROSCOPY, DUODENOSCOPY (EGD), COMBINED N/A 12/21/2017    Procedure: COMBINED ESOPHAGOSCOPY, GASTROSCOPY, DUODENOSCOPY (EGD), BIOPSY SINGLE OR MULTIPLE;  gastroscopy;  Surgeon: Srinivasa Jackson MD;  Location:  GI    TUBAL LIGATION  01/01/1990         Physical Exam   Temp: 98.2  F (36.8  C) Temp src: Temporal BP: (!) 153/87 Pulse: 79   Resp: 18 SpO2: 99 % O2 Device: None (Room air)           Vitals: reviewed by me as above  General: Pt seen on [facility], pleasant, cooperative, and alert to conversation  Eyes: Tracking well, clear conjunctiva BL  ENT: MMM, midline trachea.   Lungs: No tachypnea, no accessory muscle use. No respiratory distress.   CV: Rate as above  MSK: no joint effusion.  No evidence of trauma  Skin: No rash  Neuro: Clear speech and no facial droop.  Psych: Not RIS, no e/o  /          Diagnostics   Lab Results   Labs Ordered and Resulted from Time of ED Arrival to Time of ED Departure   COMPREHENSIVE METABOLIC PANEL - Abnormal       Result Value    Sodium 143      Potassium 3.9      Carbon Dioxide (CO2) 24      Anion Gap 11      Urea Nitrogen 16.6      Creatinine 1.07 (*)     GFR Estimate 54 (*)     Calcium 9.6      Chloride 108 (*)     Glucose 94      Alkaline Phosphatase 76      AST 28      ALT 26      Protein Total 7.0      Albumin 4.5      Bilirubin Total 0.5     TROPONIN T, HIGH SENSITIVITY - Normal    Troponin T, High Sensitivity <6     CBC WITH PLATELETS AND DIFFERENTIAL    WBC Count 6.9      RBC Count 4.63      Hemoglobin 14.2      Hematocrit 42.9      MCV 93      MCH 30.7      MCHC 33.1      RDW 13.4      Platelet Count 210      % Neutrophils 42      % Lymphocytes 45      % Monocytes 9      % Eosinophils 4      % Basophils 1      % Immature Granulocytes 0      NRBCs per 100 WBC 0      Absolute Neutrophils 2.9      Absolute Lymphocytes 3.1      Absolute Monocytes 0.6      Absolute Eosinophils 0.2      Absolute Basophils 0.0      Absolute Immature Granulocytes 0.0      Absolute NRBCs 0.0                 ED Course      Medications Administered   Medications - No data to display             Optional/Additional Documentation  None      Medical Decision Making / Diagnosis           MDM  This is a pleasant but somewhat anxious 74-year-old female who presents emergency room stating that she feels like a nuclear bomb is going off inside of her chest, and endorses the significant sensation of warmth throughout her chest.  Unfortunately she does seem to have a history of similar chest pain going back 40 years.  She is quite clear that this was the same profile only different in that it is now burning/fire-like in nature and more intense although again she continues to reaffirm that she would not like any pain medication.  It does not radiate to the mid thorax, there is no tearing  "sensation, and her vital signs are overall reassuring.  Of course given the acute and dramatic onset of the pain, I considered the possible diagnosis of an aortic dissection although this does not really fit with her pain profile or overall gestalt.  Her troponin is undetectably low, we talked about doing an x-ray, though given the congestive symptoms and previous x-rays having been normal using shared decision making we decided against this.  I asked the  as well and he felt comfortable taking the patient home and noted that this is similar to previous events.  I did my best to offer multiple pain medication modalities to the patient but she continued to decline them all and I do think that she is stable to be seen in the outpatient setting.  It sounds like she feels as though her care team in the outpatient setting \"blows me off\" and I have encouraged her to follow-up with her chronic pain provider as well.  Red flags for when to come back to the ER were discussed in detail, while no clear cause of her pain was found here today I do not see any evidence of an emergent treatable malady.    ICD-10 Codes:    ICD-10-CM    1. Chest pain, unspecified type  R07.9       2. Feels hot  R20.8              Discharge Medications  Discharge Medication List as of 5/19/2025  1:41 AM                     Varun Zacarias MD  05/19/25 0453    "

## 2025-05-23 ENCOUNTER — PRE VISIT (OUTPATIENT)
Dept: NEUROSURGERY | Facility: CLINIC | Age: 75
End: 2025-05-23

## 2025-05-28 ENCOUNTER — HOSPITAL ENCOUNTER (OUTPATIENT)
Dept: CARDIOLOGY | Facility: CLINIC | Age: 75
Discharge: HOME | End: 2025-05-28
Attending: NURSE PRACTITIONER
Payer: MEDICARE

## 2025-05-28 ENCOUNTER — PATIENT OUTREACH (OUTPATIENT)
Dept: CARE COORDINATION | Facility: CLINIC | Age: 75
End: 2025-05-28

## 2025-05-28 VITALS
SYSTOLIC BLOOD PRESSURE: 130 MMHG | OXYGEN SATURATION: 98 % | WEIGHT: 157.2 LBS | HEIGHT: 65 IN | DIASTOLIC BLOOD PRESSURE: 64 MMHG | BODY MASS INDEX: 26.19 KG/M2

## 2025-05-28 VITALS — DIASTOLIC BLOOD PRESSURE: 68 MMHG | HEART RATE: 71 BPM | SYSTOLIC BLOOD PRESSURE: 128 MMHG

## 2025-05-28 DIAGNOSIS — R07.9 CHEST PAIN: ICD-10-CM

## 2025-05-28 DIAGNOSIS — I25.10 CORONARY ARTERY DISEASE INVOLVING NATIVE CORONARY ARTERY OF NATIVE HEART WITHOUT ANGINA PECTORIS: ICD-10-CM

## 2025-05-28 LAB
CV STRESS MAX HR HE: 98
NUC STRESS EJECTION FRACTION: 69 %
RATE PRESSURE PRODUCT: NORMAL
STRESS ECHO BASELINE DIASTOLIC HE: 68
STRESS ECHO BASELINE HR: 68 BPM
STRESS ECHO BASELINE SYSTOLIC BP: 128
STRESS ECHO CALCULATED PERCENT HR: 67 %
STRESS ECHO LAST STRESS DIASTOLIC BP: 56
STRESS ECHO LAST STRESS SYSTOLIC BP: 140
STRESS ECHO TARGET HR: 146

## 2025-05-28 PROCEDURE — 250N000011 HC RX IP 250 OP 636: Mod: JZ | Performed by: INTERNAL MEDICINE

## 2025-05-28 PROCEDURE — 343N000001 HC RX 343 MED OP 636: Performed by: NURSE PRACTITIONER

## 2025-05-28 PROCEDURE — A9502 TC99M TETROFOSMIN: HCPCS | Performed by: NURSE PRACTITIONER

## 2025-05-28 PROCEDURE — 93017 CV STRESS TEST TRACING ONLY: CPT

## 2025-05-28 RX ORDER — CAFFEINE CITRATE 20 MG/ML
60 SOLUTION INTRAVENOUS
Status: ACTIVE | OUTPATIENT
Start: 2025-05-28 | End: 2025-05-28

## 2025-05-28 RX ORDER — LORAZEPAM 0.5 MG/1
0.5 TABLET ORAL EVERY 30 MIN PRN
Status: DISCONTINUED | OUTPATIENT
Start: 2025-05-28 | End: 2025-05-29 | Stop reason: HOSPADM

## 2025-05-28 RX ORDER — CAFFEINE 200 MG
200 TABLET ORAL
Status: ACTIVE | OUTPATIENT
Start: 2025-05-28 | End: 2025-05-28

## 2025-05-28 RX ORDER — DIAZEPAM 5 MG/1
5 TABLET ORAL EVERY 30 MIN PRN
Status: DISCONTINUED | OUTPATIENT
Start: 2025-05-28 | End: 2025-05-29 | Stop reason: HOSPADM

## 2025-05-28 RX ORDER — NITROGLYCERIN 0.4 MG/1
0.4 TABLET SUBLINGUAL EVERY 5 MIN PRN
Status: ACTIVE | OUTPATIENT
Start: 2025-05-28 | End: 2025-05-28

## 2025-05-28 RX ORDER — REGADENOSON 0.08 MG/ML
0.4 INJECTION, SOLUTION INTRAVENOUS ONCE
Status: COMPLETED | OUTPATIENT
Start: 2025-05-28 | End: 2025-05-28

## 2025-05-28 RX ORDER — ALBUTEROL SULFATE 90 UG/1
2 INHALANT RESPIRATORY (INHALATION) EVERY 5 MIN PRN
Status: DISCONTINUED | OUTPATIENT
Start: 2025-05-28 | End: 2025-05-29 | Stop reason: HOSPADM

## 2025-05-28 RX ORDER — AMINOPHYLLINE 25 MG/ML
50-100 INJECTION, SOLUTION INTRAVENOUS
Status: DISCONTINUED | OUTPATIENT
Start: 2025-05-28 | End: 2025-05-29 | Stop reason: HOSPADM

## 2025-05-28 RX ORDER — LIDOCAINE 40 MG/G
CREAM TOPICAL
Status: DISCONTINUED | OUTPATIENT
Start: 2025-05-28 | End: 2025-05-29 | Stop reason: HOSPADM

## 2025-05-28 RX ORDER — SODIUM CHLORIDE 9 MG/ML
INJECTION, SOLUTION INTRAVENOUS CONTINUOUS PRN
Status: ACTIVE | OUTPATIENT
Start: 2025-05-28 | End: 2025-05-28

## 2025-05-28 RX ADMIN — TETROFOSMIN 3.38 MILLICURIE: 1.38 INJECTION, POWDER, LYOPHILIZED, FOR SOLUTION INTRAVENOUS at 08:20

## 2025-05-28 RX ADMIN — TETROFOSMIN 9.42 MILLICURIE: 1.38 INJECTION, POWDER, LYOPHILIZED, FOR SOLUTION INTRAVENOUS at 09:55

## 2025-05-28 RX ADMIN — REGADENOSON 0.4 MG: 0.08 INJECTION, SOLUTION INTRAVENOUS at 09:52

## 2025-05-28 NOTE — PROGRESS NOTES
[facility] Medicare ACO Reach Population - Proactive Outreach    Background: Patient outreach conducted proactively to support health maintenance initiatives and identify any opportunities to integrated Care Coordination assistance in patient-centered goals.      Patient agreeable to scheduling Hospital/ED follow up? No     If No, CC team member encouraged patient to contact [facility] at [phone] to schedule an appointment in the future, or schedule through [email or web address].    Patient accepts CC: Sonia Batista RN  [facility]

## 2025-05-29 ENCOUNTER — RESULTS FOLLOW-UP (OUTPATIENT)
Dept: CARDIOLOGY | Facility: CLINIC | Age: 75
End: 2025-05-29

## 2025-06-26 ENCOUNTER — MYC MEDICAL ADVICE (OUTPATIENT)
Dept: INTERNAL MEDICINE | Facility: CLINIC | Age: 75
End: 2025-06-26
Payer: MEDICARE

## 2025-06-26 DIAGNOSIS — K21.9 GASTROESOPHAGEAL REFLUX DISEASE WITHOUT ESOPHAGITIS: ICD-10-CM

## 2025-06-26 RX ORDER — OMEPRAZOLE 40 MG/1
CAPSULE, DELAYED RELEASE ORAL
Qty: 90 CAPSULE | Refills: 2 | Status: SHIPPED | OUTPATIENT
Start: 2025-06-26

## (undated) DEVICE — CATH ANGIO JUDKINS JL3.5 6FRX100CM INFINITI 534618T

## (undated) DEVICE — DEFIB PRO-PADZ LVP LQD GEL ADULT 8900-2105-01

## (undated) DEVICE — CATH ANGIO INFINITI PIGTAIL 145 6 SH 6FRX110CM  534-652S

## (undated) DEVICE — KIT LG BORE TOUHY ACCESS PLUS MAP152

## (undated) DEVICE — MANIFOLD KIT ANGIO AUTOMATED 014613

## (undated) DEVICE — TOTE ANGIO CORP PC15AT SAN32CC83O

## (undated) DEVICE — SYR RED NITRO PRNT 10CC

## (undated) DEVICE — GUIDEWIRE VERRATA PLUS PRESSURE 185CM JTIP 10185JP

## (undated) DEVICE — INTRO GLIDESHEATH SLENDER 6FR 10X45CM 60-1060

## (undated) DEVICE — CATH ANGIO JUDKINS R4 6FRX100CM INFINITI 534621T

## (undated) DEVICE — CATH ANGIO JUDKINS JL4 6FRX100CM INFINITI 534620T

## (undated) DEVICE — SLEEVE TR BAND RADIAL COMPRESSION DEVICE 24CM TRB24-REG

## (undated) DEVICE — WIRE GUIDE 0.035"X260CM SAFE-T-J EXCHANGE G00517

## (undated) DEVICE — KIT HAND CONTROL ANGIOTOUCH ACIST 65CM AT-P65

## (undated) RX ORDER — HEPARIN SODIUM 200 [USP'U]/100ML
INJECTION, SOLUTION INTRAVENOUS
Status: DISPENSED
Start: 2020-03-10

## (undated) RX ORDER — VERAPAMIL HYDROCHLORIDE 2.5 MG/ML
INJECTION, SOLUTION INTRAVENOUS
Status: DISPENSED
Start: 2020-03-10

## (undated) RX ORDER — REGADENOSON 0.08 MG/ML
INJECTION, SOLUTION INTRAVENOUS
Status: DISPENSED
Start: 2025-05-28

## (undated) RX ORDER — REGADENOSON 0.08 MG/ML
INJECTION, SOLUTION INTRAVENOUS
Status: DISPENSED
Start: 2021-12-21

## (undated) RX ORDER — NITROGLYCERIN 5 MG/ML
VIAL (ML) INTRAVENOUS
Status: DISPENSED
Start: 2020-03-10

## (undated) RX ORDER — ACETAMINOPHEN 325 MG/1
TABLET ORAL
Status: DISPENSED
Start: 2020-03-10

## (undated) RX ORDER — FENTANYL CITRATE 50 UG/ML
INJECTION, SOLUTION INTRAMUSCULAR; INTRAVENOUS
Status: DISPENSED
Start: 2020-03-10

## (undated) RX ORDER — LIDOCAINE HYDROCHLORIDE 10 MG/ML
INJECTION, SOLUTION EPIDURAL; INFILTRATION; INTRACAUDAL; PERINEURAL
Status: DISPENSED
Start: 2020-03-10

## (undated) RX ORDER — METOPROLOL TARTRATE 50 MG
TABLET ORAL
Status: DISPENSED
Start: 2020-03-04

## (undated) RX ORDER — HEPARIN SODIUM 1000 [USP'U]/ML
INJECTION, SOLUTION INTRAVENOUS; SUBCUTANEOUS
Status: DISPENSED
Start: 2020-03-10

## (undated) RX ORDER — METOPROLOL TARTRATE 1 MG/ML
INJECTION, SOLUTION INTRAVENOUS
Status: DISPENSED
Start: 2020-03-04

## (undated) RX ORDER — ADENOSINE 3 MG/ML
INJECTION, SOLUTION INTRAVENOUS
Status: DISPENSED
Start: 2020-03-10

## (undated) RX ORDER — FENTANYL CITRATE 50 UG/ML
INJECTION, SOLUTION INTRAMUSCULAR; INTRAVENOUS
Status: DISPENSED
Start: 2017-12-21